# Patient Record
Sex: FEMALE | Race: BLACK OR AFRICAN AMERICAN | NOT HISPANIC OR LATINO | Employment: OTHER | URBAN - METROPOLITAN AREA
[De-identification: names, ages, dates, MRNs, and addresses within clinical notes are randomized per-mention and may not be internally consistent; named-entity substitution may affect disease eponyms.]

---

## 2017-01-27 ENCOUNTER — HISTORICAL (OUTPATIENT)
Dept: RADIOLOGY | Facility: HOSPITAL | Age: 52
End: 2017-01-27

## 2017-02-02 ENCOUNTER — HISTORICAL (OUTPATIENT)
Dept: RADIOLOGY | Facility: HOSPITAL | Age: 52
End: 2017-02-02

## 2017-05-11 ENCOUNTER — HISTORICAL (OUTPATIENT)
Dept: RADIOLOGY | Facility: HOSPITAL | Age: 52
End: 2017-05-11

## 2017-10-04 ENCOUNTER — HISTORICAL (OUTPATIENT)
Dept: LAB | Facility: HOSPITAL | Age: 52
End: 2017-10-04

## 2017-10-04 LAB
ALBUMIN SERPL-MCNC: 4.1 GM/DL (ref 3.4–5)
ALBUMIN/GLOB SERPL: 1.3 RATIO (ref 1.1–2)
ALP SERPL-CCNC: 87 UNIT/L (ref 46–116)
ALT SERPL-CCNC: 31 UNIT/L (ref 12–78)
AST SERPL-CCNC: 18 UNIT/L (ref 15–37)
BILIRUB SERPL-MCNC: 0.5 MG/DL (ref 0.2–1)
BILIRUBIN DIRECT+TOT PNL SERPL-MCNC: 0.13 MG/DL (ref 0–0.2)
BILIRUBIN DIRECT+TOT PNL SERPL-MCNC: 0.37 MG/DL (ref 0–0.8)
BUN SERPL-MCNC: 14.2 MG/DL (ref 7–18)
CALCIUM SERPL-MCNC: 9.6 MG/DL (ref 8.5–10.1)
CHLORIDE SERPL-SCNC: 101 MMOL/L (ref 98–107)
CHOLEST SERPL-MCNC: 235 MG/DL (ref 0–200)
CHOLEST/HDLC SERPL: 3.8 {RATIO} (ref 0–4)
CO2 SERPL-SCNC: 31 MMOL/L (ref 21–32)
CREAT SERPL-MCNC: 0.8 MG/DL (ref 0.6–1.3)
DEPRECATED CALCIDIOL+CALCIFEROL SERPL-MC: 37.32 NG/ML (ref 30–80)
ERYTHROCYTE [DISTWIDTH] IN BLOOD BY AUTOMATED COUNT: 13.9 % (ref 11.5–17)
EST. AVERAGE GLUCOSE BLD GHB EST-MCNC: 160 MG/DL
FERRITIN SERPL-MCNC: 26 NG/ML (ref 8–388)
FOLATE SERPL-MCNC: 14.6 NG/ML (ref 8.6–58.9)
GLOBULIN SER-MCNC: 3.2 GM/DL (ref 2.4–3.5)
GLUCOSE SERPL-MCNC: 179 MG/DL (ref 74–106)
HBA1C MFR BLD: 7.2 % (ref 4.5–6.2)
HCT VFR BLD AUTO: 39.9 % (ref 37–47)
HDLC SERPL-MCNC: 62 MG/DL (ref 40–60)
HGB BLD-MCNC: 13.1 GM/DL (ref 12–16)
IRON SERPL-MCNC: 81 MCG/DL (ref 50–175)
LDLC SERPL CALC-MCNC: 156 MG/DL (ref 0–129)
MCH RBC QN AUTO: 28.1 PG (ref 27–31)
MCHC RBC AUTO-ENTMCNC: 32.9 GM/DL (ref 33–36)
MCV RBC AUTO: 85.5 FL (ref 80–94)
PLATELET # BLD AUTO: 235 X10(3)/MCL (ref 130–400)
PMV BLD AUTO: 7.9 FL (ref 7.4–10.4)
POTASSIUM SERPL-SCNC: 3.4 MMOL/L (ref 3.5–5.1)
PROT SERPL-MCNC: 7.3 GM/DL (ref 6.4–8.2)
RBC # BLD AUTO: 4.67 X10(6)/MCL (ref 4.2–5.4)
SODIUM SERPL-SCNC: 142 MMOL/L (ref 136–145)
T4 FREE SERPL-MCNC: 1.05 NG/DL (ref 0.76–1.46)
TRIGL SERPL-MCNC: 85 MG/DL
TSH SERPL-ACNC: 1.82 MIU/ML (ref 0.36–3.74)
VIT B12 SERPL-MCNC: 342 PG/ML (ref 193–986)
VLDLC SERPL CALC-MCNC: 17 MG/DL
WBC # SPEC AUTO: 7.1 X10(3)/MCL (ref 4.5–11.5)

## 2018-03-15 ENCOUNTER — HISTORICAL (OUTPATIENT)
Dept: LAB | Facility: HOSPITAL | Age: 53
End: 2018-03-15

## 2018-03-15 LAB
ABS NEUT (OLG): 3.3 X10(3)/MCL (ref 2.1–9.2)
ALBUMIN SERPL-MCNC: 3.5 GM/DL (ref 3.4–5)
ALBUMIN/GLOB SERPL: 1 RATIO (ref 1.1–2)
ALP SERPL-CCNC: 83 UNIT/L (ref 46–116)
ALT SERPL-CCNC: 33 UNIT/L (ref 12–78)
AST SERPL-CCNC: 24 UNIT/L (ref 15–37)
BASOPHILS # BLD AUTO: 0.1 X10(3)/MCL
BASOPHILS NFR BLD AUTO: 1 % (ref 0–2)
BILIRUB SERPL-MCNC: 0.4 MG/DL (ref 0.2–1)
BILIRUBIN DIRECT+TOT PNL SERPL-MCNC: 0.13 MG/DL (ref 0–0.2)
BILIRUBIN DIRECT+TOT PNL SERPL-MCNC: 0.26 MG/DL (ref 0–0.8)
BUN SERPL-MCNC: 11.6 MG/DL (ref 7–18)
CALCIUM SERPL-MCNC: 9.4 MG/DL (ref 8.5–10.1)
CHLORIDE SERPL-SCNC: 105 MMOL/L (ref 98–107)
CHOLEST SERPL-MCNC: 134 MG/DL (ref 0–200)
CHOLEST/HDLC SERPL: 1.9 {RATIO} (ref 0–4)
CO2 SERPL-SCNC: 31.5 MMOL/L (ref 21–32)
CREAT SERPL-MCNC: 0.6 MG/DL (ref 0.6–1.3)
DEPRECATED CALCIDIOL+CALCIFEROL SERPL-MC: 22.8 NG/ML (ref 30–80)
EOSINOPHIL # BLD AUTO: 0.1 X10(3)/MCL
EOSINOPHIL NFR BLD AUTO: 1 %
ERYTHROCYTE [DISTWIDTH] IN BLOOD BY AUTOMATED COUNT: 13.9 % (ref 11.5–17)
EST. AVERAGE GLUCOSE BLD GHB EST-MCNC: 154 MG/DL
FOLATE SERPL-MCNC: 5.2 NG/ML (ref 8.6–58.9)
GLOBULIN SER-MCNC: 3.5 GM/DL (ref 2.4–3.5)
GLUCOSE SERPL-MCNC: 124 MG/DL (ref 74–106)
HBA1C MFR BLD: 7 % (ref 4.5–6.2)
HCT VFR BLD AUTO: 38.4 % (ref 37–47)
HDLC SERPL-MCNC: 71 MG/DL (ref 40–60)
HGB BLD-MCNC: 12.2 GM/DL (ref 12–16)
LDLC SERPL CALC-MCNC: 52 MG/DL (ref 0–129)
LYMPHOCYTES # BLD AUTO: 2.4 X10(3)/MCL
LYMPHOCYTES NFR BLD AUTO: 39 % (ref 13–40)
MAGNESIUM SERPL-MCNC: 1.8 MG/DL (ref 1.8–2.4)
MCH RBC QN AUTO: 27.7 PG (ref 27–31)
MCHC RBC AUTO-ENTMCNC: 31.9 GM/DL (ref 33–36)
MCV RBC AUTO: 86.8 FL (ref 80–94)
MONOCYTES # BLD AUTO: 0.3 X10(3)/MCL
MONOCYTES NFR BLD AUTO: 5 % (ref 2–11)
NEUTROPHILS # BLD AUTO: 3.3 X10(3)/MCL (ref 2.1–9.2)
NEUTROPHILS NFR BLD AUTO: 54 % (ref 47–80)
PLATELET # BLD AUTO: 300 X10(3)/MCL (ref 130–400)
PMV BLD AUTO: 8.1 FL (ref 7.4–10.4)
POTASSIUM SERPL-SCNC: 3.8 MMOL/L (ref 3.5–5.1)
PREALB SERPL-MCNC: 24.2 MG/DL (ref 18–35.7)
PROT SERPL-MCNC: 7 GM/DL (ref 6.4–8.2)
RBC # BLD AUTO: 4.43 X10(6)/MCL (ref 4.2–5.4)
SODIUM SERPL-SCNC: 143 MMOL/L (ref 136–145)
TRIGL SERPL-MCNC: 57 MG/DL
TSH SERPL-ACNC: 2.15 MIU/ML (ref 0.36–3.74)
VLDLC SERPL CALC-MCNC: 11 MG/DL
WBC # SPEC AUTO: 6.1 X10(3)/MCL (ref 4.5–11.5)

## 2018-06-18 LAB
BILIRUB SERPL-MCNC: NEGATIVE MG/DL
BLOOD URINE, POC: NEGATIVE
GLUCOSE UR QL STRIP: NEGATIVE
KETONES UR QL STRIP: NEGATIVE
LEUKOCYTE EST, POC UA: NEGATIVE
NITRITE, POC UA: NEGATIVE
PH, POC UA: 5
POC BETA-HCG (QUAL): NEGATIVE
PROTEIN, POC: NEGATIVE
SPECIFIC GRAVITY, POC UA: 1.02
UROBILINOGEN, POC UA: NORMAL

## 2018-06-20 ENCOUNTER — HISTORICAL (OUTPATIENT)
Dept: RADIOLOGY | Facility: HOSPITAL | Age: 53
End: 2018-06-20

## 2018-07-30 ENCOUNTER — HISTORICAL (OUTPATIENT)
Dept: RADIOLOGY | Facility: HOSPITAL | Age: 53
End: 2018-07-30

## 2018-08-13 ENCOUNTER — HISTORICAL (OUTPATIENT)
Dept: RADIOLOGY | Facility: HOSPITAL | Age: 53
End: 2018-08-13

## 2018-08-27 ENCOUNTER — HISTORICAL (OUTPATIENT)
Dept: MEDSURG UNIT | Facility: HOSPITAL | Age: 53
End: 2018-08-27

## 2018-08-27 LAB
ABS NEUT (OLG): 4.6 X10(3)/MCL (ref 1.5–6.9)
ALBUMIN SERPL-MCNC: 3.6 GM/DL (ref 3.4–5)
ALBUMIN/GLOB SERPL: 0.8 RATIO
ALP SERPL-CCNC: 96 UNIT/L (ref 30–113)
ALT SERPL-CCNC: 25 UNIT/L (ref 10–45)
APTT PPP: 24.5 SECOND(S) (ref 25–35)
AST SERPL-CCNC: 22 UNIT/L (ref 15–37)
BASOPHILS # BLD AUTO: 0 X10(3)/MCL (ref 0–0.1)
BASOPHILS NFR BLD AUTO: 0 % (ref 0–1)
BILIRUB SERPL-MCNC: 0.4 MG/DL (ref 0.1–0.9)
BILIRUBIN DIRECT+TOT PNL SERPL-MCNC: 0.1 MG/DL (ref 0–0.3)
BILIRUBIN DIRECT+TOT PNL SERPL-MCNC: 0.3 MG/DL
BUN SERPL-MCNC: 10 MG/DL (ref 10–20)
CALCIUM SERPL-MCNC: 9.4 MG/DL (ref 8–10.5)
CHLORIDE SERPL-SCNC: 101 MMOL/L (ref 100–108)
CO2 SERPL-SCNC: 32 MMOL/L (ref 21–35)
CREAT SERPL-MCNC: 0.66 MG/DL (ref 0.7–1.3)
EOSINOPHIL # BLD AUTO: 0.1 X10(3)/MCL (ref 0–0.6)
EOSINOPHIL NFR BLD AUTO: 1 % (ref 0–5)
ERYTHROCYTE [DISTWIDTH] IN BLOOD BY AUTOMATED COUNT: 12.6 % (ref 11.5–17)
GLOBULIN SER-MCNC: 4.4 GM/DL
GLUCOSE SERPL-MCNC: 139 MG/DL (ref 75–116)
HCT VFR BLD AUTO: 39.2 % (ref 36–48)
HGB BLD-MCNC: 13 GM/DL (ref 12–16)
INR PPP: 1 (ref 0–1.2)
LYMPHOCYTES # BLD AUTO: 2.8 X10(3)/MCL (ref 0.5–4.1)
LYMPHOCYTES NFR BLD AUTO: 34.5 % (ref 15–40)
MCH RBC QN AUTO: 29 PG (ref 27–34)
MCHC RBC AUTO-ENTMCNC: 33 GM/DL (ref 31–36)
MCV RBC AUTO: 86 FL (ref 80–99)
MONOCYTES # BLD AUTO: 0.5 X10(3)/MCL (ref 0–1.1)
MONOCYTES NFR BLD AUTO: 6 % (ref 4–12)
NEUTROPHILS # BLD AUTO: 4.6 X10(3)/MCL (ref 1.5–6.9)
NEUTROPHILS NFR BLD AUTO: 58 % (ref 43–75)
PLATELET # BLD AUTO: 259 X10(3)/MCL (ref 140–400)
PMV BLD AUTO: 9.9 FL (ref 6.8–10)
POTASSIUM SERPL-SCNC: 3.9 MMOL/L (ref 3.6–5.2)
PROT SERPL-MCNC: 8 GM/DL (ref 6.4–8.2)
PROTHROMBIN TIME: 10.3 SECOND(S) (ref 9–12)
RBC # BLD AUTO: 4.53 X10(6)/MCL (ref 4.2–5.4)
SODIUM SERPL-SCNC: 141 MMOL/L (ref 135–145)
WBC # SPEC AUTO: 8 X10(3)/MCL (ref 4.5–11.5)

## 2018-09-02 LAB — FINAL CULTURE: NORMAL

## 2018-10-05 ENCOUNTER — HISTORICAL (OUTPATIENT)
Dept: SURGERY | Facility: HOSPITAL | Age: 53
End: 2018-10-05

## 2018-10-05 LAB
ABS NEUT (OLG): 2.6 X10(3)/MCL (ref 1.5–6.9)
ALBUMIN SERPL-MCNC: 3.4 GM/DL (ref 3.4–5)
ALBUMIN/GLOB SERPL: 1 RATIO
ALP SERPL-CCNC: 86 UNIT/L (ref 30–113)
ALT SERPL-CCNC: 80 UNIT/L (ref 10–45)
APTT PPP: 24.6 SECOND(S) (ref 25–35)
AST SERPL-CCNC: 61 UNIT/L (ref 15–37)
BILIRUB SERPL-MCNC: 0.4 MG/DL (ref 0.1–0.9)
BILIRUBIN DIRECT+TOT PNL SERPL-MCNC: 0.1 MG/DL (ref 0–0.3)
BILIRUBIN DIRECT+TOT PNL SERPL-MCNC: 0.3 MG/DL
BUN SERPL-MCNC: 9 MG/DL (ref 10–20)
CALCIUM SERPL-MCNC: 8.6 MG/DL (ref 8–10.5)
CHLORIDE SERPL-SCNC: 107 MMOL/L (ref 100–108)
CO2 SERPL-SCNC: 32 MMOL/L (ref 21–35)
CREAT SERPL-MCNC: 0.6 MG/DL (ref 0.7–1.3)
ERYTHROCYTE [DISTWIDTH] IN BLOOD BY AUTOMATED COUNT: 12.3 % (ref 11.5–17)
GLOBULIN SER-MCNC: 3.5 GM/DL
GLUCOSE SERPL-MCNC: 98 MG/DL (ref 75–116)
HCT VFR BLD AUTO: 37.3 % (ref 36–48)
HGB BLD-MCNC: 11.5 GM/DL (ref 12–16)
INR PPP: 1 (ref 0–1.2)
MCH RBC QN AUTO: 27 PG (ref 27–34)
MCHC RBC AUTO-ENTMCNC: 31 GM/DL (ref 31–36)
MCV RBC AUTO: 89 FL (ref 80–99)
PLATELET # BLD AUTO: 201 X10(3)/MCL (ref 140–400)
PMV BLD AUTO: 10.2 FL (ref 6.8–10)
POTASSIUM SERPL-SCNC: 3.6 MMOL/L (ref 3.6–5.2)
PROT SERPL-MCNC: 6.9 GM/DL (ref 6.4–8.2)
PROTHROMBIN TIME: 10.1 SECOND(S) (ref 9–12)
RBC # BLD AUTO: 4.19 X10(6)/MCL (ref 4.2–5.4)
SODIUM SERPL-SCNC: 143 MMOL/L (ref 135–145)
WBC # SPEC AUTO: 5.5 X10(3)/MCL (ref 4.5–11.5)

## 2018-10-15 ENCOUNTER — HISTORICAL (OUTPATIENT)
Dept: ANESTHESIOLOGY | Facility: HOSPITAL | Age: 53
End: 2018-10-15

## 2019-03-28 ENCOUNTER — HISTORICAL (OUTPATIENT)
Dept: RADIOLOGY | Facility: HOSPITAL | Age: 54
End: 2019-03-28

## 2019-08-29 LAB
ABS NEUT (OLG): 4.6 X10(3)/MCL (ref 1.5–6.9)
ALBUMIN SERPL-MCNC: 3.9 GM/DL (ref 3.4–5)
ALBUMIN/GLOB SERPL: 0.8 RATIO
ALP SERPL-CCNC: 187 UNIT/L (ref 30–113)
ALT SERPL-CCNC: 69 UNIT/L (ref 10–45)
APTT PPP: 24.7 SECOND(S) (ref 25–35)
AST SERPL-CCNC: 44 UNIT/L (ref 15–37)
BASOPHILS # BLD AUTO: 0 X10(3)/MCL (ref 0–0.1)
BASOPHILS NFR BLD AUTO: 0 % (ref 0–1)
BILIRUB SERPL-MCNC: 0.4 MG/DL (ref 0.1–0.9)
BILIRUBIN DIRECT+TOT PNL SERPL-MCNC: 0.1 MG/DL (ref 0–0.3)
BILIRUBIN DIRECT+TOT PNL SERPL-MCNC: 0.3 MG/DL
BUN SERPL-MCNC: 14 MG/DL (ref 10–20)
CALCIUM SERPL-MCNC: 9.5 MG/DL (ref 8–10.5)
CHLORIDE SERPL-SCNC: 100 MMOL/L (ref 100–108)
CO2 SERPL-SCNC: 33 MMOL/L (ref 21–35)
CREAT SERPL-MCNC: 0.61 MG/DL (ref 0.7–1.3)
EOSINOPHIL # BLD AUTO: 0.1 X10(3)/MCL (ref 0–0.6)
EOSINOPHIL NFR BLD AUTO: 1 % (ref 0–5)
ERYTHROCYTE [DISTWIDTH] IN BLOOD BY AUTOMATED COUNT: 13.4 % (ref 11.5–17)
GLOBULIN SER-MCNC: 4.8 GM/DL
GLUCOSE SERPL-MCNC: 131 MG/DL (ref 75–116)
HCT VFR BLD AUTO: 42.1 % (ref 36–48)
HGB BLD-MCNC: 13.6 GM/DL (ref 12–16)
IMM GRANULOCYTES # BLD AUTO: 0.02 10*3/UL (ref 0–0.02)
IMM GRANULOCYTES NFR BLD AUTO: 0.2 % (ref 0–0.43)
INR PPP: 0.9 (ref 0–1.2)
LYMPHOCYTES # BLD AUTO: 3.1 X10(3)/MCL (ref 0.5–4.1)
LYMPHOCYTES NFR BLD AUTO: 38 % (ref 15–40)
MCH RBC QN AUTO: 28 PG (ref 27–34)
MCHC RBC AUTO-ENTMCNC: 32 GM/DL (ref 31–36)
MCV RBC AUTO: 86 FL (ref 80–99)
MONOCYTES # BLD AUTO: 0.5 X10(3)/MCL (ref 0–1.1)
MONOCYTES NFR BLD AUTO: 6 % (ref 4–12)
NEUTROPHILS # BLD AUTO: 4.6 X10(3)/MCL (ref 1.5–6.9)
NEUTROPHILS NFR BLD AUTO: 55 % (ref 43–75)
PLATELET # BLD AUTO: 226 X10(3)/MCL (ref 140–400)
PMV BLD AUTO: 9.6 FL (ref 6.8–10)
POTASSIUM SERPL-SCNC: 3.6 MMOL/L (ref 3.6–5.2)
PROT SERPL-MCNC: 8.7 GM/DL (ref 6.4–8.2)
PROTHROMBIN TIME: 9.7 SECOND(S) (ref 9–12)
RBC # BLD AUTO: 4.91 X10(6)/MCL (ref 4.2–5.4)
SODIUM SERPL-SCNC: 139 MMOL/L (ref 135–145)
WBC # SPEC AUTO: 8.4 X10(3)/MCL (ref 4.5–11.5)

## 2019-09-04 ENCOUNTER — HISTORICAL (OUTPATIENT)
Dept: RADIOLOGY | Facility: HOSPITAL | Age: 54
End: 2019-09-04

## 2019-09-05 ENCOUNTER — HISTORICAL (OUTPATIENT)
Dept: ANESTHESIOLOGY | Facility: HOSPITAL | Age: 54
End: 2019-09-05

## 2019-09-24 ENCOUNTER — HISTORICAL (OUTPATIENT)
Dept: RADIOLOGY | Facility: HOSPITAL | Age: 54
End: 2019-09-24

## 2019-10-04 LAB — PAP RECOMMENDATION EXT: NORMAL

## 2019-10-16 ENCOUNTER — HISTORICAL (OUTPATIENT)
Dept: FAMILY MEDICINE | Facility: CLINIC | Age: 54
End: 2019-10-16

## 2019-10-16 LAB
ABS NEUT (OLG): 3.84 X10(3)/MCL (ref 2.1–9.2)
ALBUMIN SERPL-MCNC: 3.9 GM/DL (ref 3.4–5)
ALBUMIN/GLOB SERPL: 1 RATIO (ref 1.1–2)
ALP SERPL-CCNC: 152 UNIT/L (ref 45–117)
ALT SERPL-CCNC: 43 UNIT/L (ref 12–78)
APPEARANCE, UA: CLEAR
AST SERPL-CCNC: 35 UNIT/L (ref 15–37)
BACTERIA #/AREA URNS AUTO: ABNORMAL /[HPF]
BASOPHILS # BLD AUTO: 0 X10(3)/MCL (ref 0–0.2)
BASOPHILS NFR BLD AUTO: 1 %
BILIRUB SERPL-MCNC: 0.7 MG/DL (ref 0.2–1)
BILIRUB UR QL STRIP: NEGATIVE
BILIRUBIN DIRECT+TOT PNL SERPL-MCNC: 0.2 MG/DL (ref 0–0.2)
BILIRUBIN DIRECT+TOT PNL SERPL-MCNC: 0.5 MG/DL
BUN SERPL-MCNC: 10 MG/DL (ref 7–18)
CALCIUM SERPL-MCNC: 9 MG/DL (ref 8.5–10.1)
CHLORIDE SERPL-SCNC: 104 MMOL/L (ref 98–107)
CHOLEST SERPL-MCNC: 141 MG/DL
CHOLEST/HDLC SERPL: 1.5 {RATIO} (ref 0–4.4)
CO2 SERPL-SCNC: 31 MMOL/L (ref 21–32)
COLOR UR: ABNORMAL
CREAT SERPL-MCNC: 0.6 MG/DL (ref 0.6–1.3)
CREAT UR-MCNC: 99 MG/DL
DEPRECATED CALCIDIOL+CALCIFEROL SERPL-MC: 27.71 NG/ML (ref 30–80)
EOSINOPHIL # BLD AUTO: 0.1 X10(3)/MCL (ref 0–0.9)
EOSINOPHIL NFR BLD AUTO: 1 %
ERYTHROCYTE [DISTWIDTH] IN BLOOD BY AUTOMATED COUNT: 13.9 % (ref 11.5–14.5)
EST. AVERAGE GLUCOSE BLD GHB EST-MCNC: 174 MG/DL
GLOBULIN SER-MCNC: 4 GM/ML (ref 2.3–3.5)
GLUCOSE (UA): >1000 MG/DL
GLUCOSE SERPL-MCNC: 108 MG/DL (ref 74–106)
HBA1C MFR BLD: 7.7 % (ref 4.2–6.3)
HCT VFR BLD AUTO: 41.4 % (ref 35–46)
HDLC SERPL-MCNC: 94 MG/DL (ref 40–59)
HGB BLD-MCNC: 13 GM/DL (ref 12–16)
HGB UR QL STRIP: NEGATIVE
HYALINE CASTS #/AREA URNS LPF: ABNORMAL /[LPF]
IMM GRANULOCYTES # BLD AUTO: 0.01 10*3/UL
IMM GRANULOCYTES NFR BLD AUTO: 0 %
KETONES UR QL STRIP: NEGATIVE
LDLC SERPL CALC-MCNC: 33 MG/DL
LEUKOCYTE ESTERASE UR QL STRIP: 25 LEU/UL
LYMPHOCYTES # BLD AUTO: 1.5 X10(3)/MCL (ref 0.6–4.6)
LYMPHOCYTES NFR BLD AUTO: 25 %
MCH RBC QN AUTO: 27.5 PG (ref 26–34)
MCHC RBC AUTO-ENTMCNC: 31.4 GM/DL (ref 31–37)
MCV RBC AUTO: 87.7 FL (ref 80–100)
MICROALBUMIN UR-MCNC: 8.4 MG/L (ref 0–19)
MICROALBUMIN/CREAT RATIO PNL UR: 8.5 MCG/MG CR (ref 0–29)
MONOCYTES # BLD AUTO: 0.4 X10(3)/MCL (ref 0.1–1.3)
MONOCYTES NFR BLD AUTO: 6 %
NEUTROPHILS # BLD AUTO: 3.84 X10(3)/MCL (ref 2.1–9.2)
NEUTROPHILS NFR BLD AUTO: 66 %
NITRITE UR QL STRIP: NEGATIVE
PH UR STRIP: 7.5 [PH] (ref 4.5–8)
PLATELET # BLD AUTO: 223 X10(3)/MCL (ref 130–400)
PMV BLD AUTO: 9.8 FL (ref 7.4–10.4)
POTASSIUM SERPL-SCNC: 3.8 MMOL/L (ref 3.5–5.1)
PROT SERPL-MCNC: 7.9 GM/DL (ref 6.4–8.2)
PROT UR QL STRIP: NEGATIVE
RBC # BLD AUTO: 4.72 X10(6)/MCL (ref 4–5.2)
RBC #/AREA URNS AUTO: ABNORMAL /[HPF]
SODIUM SERPL-SCNC: 139 MMOL/L (ref 136–145)
SP GR UR STRIP: 1.03 (ref 1–1.03)
SQUAMOUS #/AREA URNS LPF: ABNORMAL /[LPF]
T3FREE SERPL-MCNC: 2.32 PG/ML (ref 2.18–3.98)
T4 FREE SERPL-MCNC: 0.92 NG/DL (ref 0.76–1.46)
TRIGL SERPL-MCNC: 71 MG/DL
TSH SERPL-ACNC: 1.87 MIU/L (ref 0.36–3.74)
UROBILINOGEN UR STRIP-ACNC: NORMAL
VLDLC SERPL CALC-MCNC: 14 MG/DL
WBC # SPEC AUTO: 5.8 X10(3)/MCL (ref 4.5–11)
WBC #/AREA URNS AUTO: ABNORMAL /HPF

## 2019-10-18 ENCOUNTER — HISTORICAL (OUTPATIENT)
Dept: RADIOLOGY | Facility: HOSPITAL | Age: 54
End: 2019-10-18

## 2019-12-20 ENCOUNTER — HISTORICAL (OUTPATIENT)
Dept: ADMINISTRATIVE | Facility: HOSPITAL | Age: 54
End: 2019-12-20

## 2020-01-07 ENCOUNTER — HISTORICAL (OUTPATIENT)
Dept: RADIOLOGY | Facility: HOSPITAL | Age: 55
End: 2020-01-07

## 2020-01-07 LAB
CK SERPL-CCNC: 154 UNIT/L (ref 26–192)
EST. AVERAGE GLUCOSE BLD GHB EST-MCNC: 171 MG/DL
HBA1C MFR BLD: 7.6 % (ref 4.2–6.3)

## 2020-01-08 ENCOUNTER — HISTORICAL (OUTPATIENT)
Dept: ADMINISTRATIVE | Facility: HOSPITAL | Age: 55
End: 2020-01-08

## 2020-01-30 ENCOUNTER — HISTORICAL (OUTPATIENT)
Dept: LAB | Facility: HOSPITAL | Age: 55
End: 2020-01-30

## 2020-01-30 LAB
ABS NEUT (OLG): 3.26 X10(3)/MCL (ref 2.1–9.2)
ALBUMIN SERPL-MCNC: 3.8 GM/DL (ref 3.4–5)
ALBUMIN/GLOB SERPL: 1.1 RATIO (ref 1.1–2)
ALP SERPL-CCNC: 94 UNIT/L (ref 46–116)
ALT SERPL-CCNC: 33 UNIT/L (ref 12–78)
AST SERPL-CCNC: 23 UNIT/L (ref 15–37)
BASOPHILS # BLD AUTO: 0 X10(3)/MCL (ref 0–0.2)
BASOPHILS NFR BLD AUTO: 0 %
BILIRUB SERPL-MCNC: 0.5 MG/DL (ref 0.2–1)
BILIRUBIN DIRECT+TOT PNL SERPL-MCNC: 0.18 MG/DL (ref 0–0.2)
BILIRUBIN DIRECT+TOT PNL SERPL-MCNC: 0.32 MG/DL (ref 0–0.8)
BUN SERPL-MCNC: 17.8 MG/DL (ref 7–18)
CALCIUM SERPL-MCNC: 9.5 MG/DL (ref 8.5–10.1)
CHLORIDE SERPL-SCNC: 103 MMOL/L (ref 98–107)
CHOLEST SERPL-MCNC: 115 MG/DL (ref 0–200)
CHOLEST/HDLC SERPL: 1.5 {RATIO} (ref 0–4)
CO2 SERPL-SCNC: 30.9 MMOL/L (ref 21–32)
CREAT SERPL-MCNC: 0.58 MG/DL (ref 0.6–1.3)
DEPRECATED CALCIDIOL+CALCIFEROL SERPL-MC: 43.62 NG/ML (ref 30–80)
EOSINOPHIL # BLD AUTO: 0.1 X10(3)/MCL (ref 0–0.9)
EOSINOPHIL NFR BLD AUTO: 2 %
ERYTHROCYTE [DISTWIDTH] IN BLOOD BY AUTOMATED COUNT: 13.1 % (ref 11.5–17)
GLOBULIN SER-MCNC: 3.6 GM/DL (ref 2.4–3.5)
GLUCOSE SERPL-MCNC: 100 MG/DL (ref 74–106)
HCT VFR BLD AUTO: 38.8 % (ref 37–47)
HDLC SERPL-MCNC: 78 MG/DL (ref 40–60)
HGB BLD-MCNC: 12.1 GM/DL (ref 12–16)
IMM GRANULOCYTES # BLD AUTO: 0.01 % (ref 0–0.02)
IMM GRANULOCYTES NFR BLD AUTO: 0.2 % (ref 0–0.43)
LDLC SERPL CALC-MCNC: 29 MG/DL (ref 0–129)
LYMPHOCYTES # BLD AUTO: 2.5 X10(3)/MCL (ref 0.6–4.6)
LYMPHOCYTES NFR BLD AUTO: 40 %
MCH RBC QN AUTO: 27.9 PG (ref 27–31)
MCHC RBC AUTO-ENTMCNC: 31.2 GM/DL (ref 33–36)
MCV RBC AUTO: 89.6 FL (ref 80–94)
MONOCYTES # BLD AUTO: 0.3 X10(3)/MCL (ref 0.1–1.3)
MONOCYTES NFR BLD AUTO: 5 %
NEUTROPHILS # BLD AUTO: 3.26 X10(3)/MCL (ref 1.4–7.9)
NEUTROPHILS NFR BLD AUTO: 53 %
PLATELET # BLD AUTO: 227 X10(3)/MCL (ref 130–400)
PMV BLD AUTO: 10 FL (ref 9.4–12.4)
POTASSIUM SERPL-SCNC: 3.6 MMOL/L (ref 3.5–5.1)
PROT SERPL-MCNC: 7.4 GM/DL (ref 6.4–8.2)
RBC # BLD AUTO: 4.33 X10(6)/MCL (ref 4.2–5.4)
SODIUM SERPL-SCNC: 141 MMOL/L (ref 136–145)
T3FREE SERPL-MCNC: 2.4 PG/ML (ref 2.2–4)
T4 FREE SERPL-MCNC: 1.1 NG/DL (ref 0.76–1.46)
TRIGL SERPL-MCNC: 40 MG/DL
TSH SERPL-ACNC: 1.68 MIU/ML (ref 0.36–3.74)
VLDLC SERPL CALC-MCNC: 8 MG/DL
WBC # SPEC AUTO: 6.2 X10(3)/MCL (ref 4.5–11.5)

## 2020-02-04 ENCOUNTER — HISTORICAL (OUTPATIENT)
Dept: ADMINISTRATIVE | Facility: HOSPITAL | Age: 55
End: 2020-02-04

## 2020-02-04 LAB
APPEARANCE, UA: CLEAR
BACTERIA #/AREA URNS AUTO: ABNORMAL /HPF
BILIRUB UR QL STRIP: NEGATIVE
COLOR UR: ABNORMAL
GLUCOSE (UA): >1000 MG/DL
HGB UR QL STRIP: NEGATIVE
HYALINE CASTS #/AREA URNS LPF: ABNORMAL /LPF
KETONES UR QL STRIP: NEGATIVE
LEUKOCYTE ESTERASE UR QL STRIP: 250 LEU/UL
NITRITE UR QL STRIP: NEGATIVE
PH UR STRIP: 7.5 [PH] (ref 4.5–8)
PROT UR QL STRIP: NEGATIVE
RBC #/AREA URNS AUTO: ABNORMAL /HPF
SP GR UR STRIP: 1.02 (ref 1–1.03)
SQUAMOUS #/AREA URNS LPF: ABNORMAL /LPF
UROBILINOGEN UR STRIP-ACNC: NORMAL
WBC #/AREA URNS AUTO: >=100 /HPF

## 2020-03-09 ENCOUNTER — HISTORICAL (OUTPATIENT)
Dept: LAB | Facility: HOSPITAL | Age: 55
End: 2020-03-09

## 2020-03-09 LAB
ALBUMIN SERPL-MCNC: 3.7 GM/DL (ref 3.4–5)
ALP SERPL-CCNC: 93 UNIT/L (ref 46–116)
ALT SERPL-CCNC: 33 UNIT/L (ref 12–78)
AST SERPL-CCNC: 20 UNIT/L (ref 15–37)
BILIRUB SERPL-MCNC: 0.4 MG/DL (ref 0.2–1)
BILIRUBIN DIRECT+TOT PNL SERPL-MCNC: 0.11 MG/DL (ref 0–0.2)
BILIRUBIN DIRECT+TOT PNL SERPL-MCNC: 0.29 MG/DL (ref 0–0.8)
CHOLEST SERPL-MCNC: 181 MG/DL (ref 0–200)
CHOLEST/HDLC SERPL: 2.3 {RATIO} (ref 0–4)
HDLC SERPL-MCNC: 78 MG/DL (ref 40–60)
LDLC SERPL CALC-MCNC: 92 MG/DL (ref 0–129)
PROT SERPL-MCNC: 7 GM/DL (ref 6.4–8.2)
TRIGL SERPL-MCNC: 53 MG/DL
VLDLC SERPL CALC-MCNC: 11 MG/DL

## 2020-05-21 ENCOUNTER — HISTORICAL (OUTPATIENT)
Dept: ADMINISTRATIVE | Facility: HOSPITAL | Age: 55
End: 2020-05-21

## 2020-05-21 LAB
ABS NEUT (OLG): 4 X10(3)/MCL (ref 2.1–9.2)
ALBUMIN SERPL-MCNC: 3.8 GM/DL (ref 3.4–5)
ALBUMIN/GLOB SERPL: 0.9 RATIO (ref 1.1–2)
ALP SERPL-CCNC: 105 UNIT/L (ref 45–117)
ALT SERPL-CCNC: 41 UNIT/L (ref 12–78)
AST SERPL-CCNC: 26 UNIT/L (ref 15–37)
BASOPHILS # BLD AUTO: 0 X10(3)/MCL (ref 0–0.2)
BASOPHILS NFR BLD AUTO: 1 %
BILIRUB SERPL-MCNC: 0.3 MG/DL (ref 0.2–1)
BILIRUBIN DIRECT+TOT PNL SERPL-MCNC: <0.1 MG/DL (ref 0–0.2)
BILIRUBIN DIRECT+TOT PNL SERPL-MCNC: ABNORMAL MG/DL
BUN SERPL-MCNC: 11 MG/DL (ref 7–18)
CALCIUM SERPL-MCNC: 9.4 MG/DL (ref 8.5–10.1)
CHLORIDE SERPL-SCNC: 105 MMOL/L (ref 98–107)
CHOLEST SERPL-MCNC: 190 MG/DL
CHOLEST/HDLC SERPL: 2.2 {RATIO} (ref 0–4.4)
CO2 SERPL-SCNC: 31 MMOL/L (ref 21–32)
CREAT SERPL-MCNC: 0.6 MG/DL (ref 0.6–1.3)
DEPRECATED CALCIDIOL+CALCIFEROL SERPL-MC: 24.1 NG/ML (ref 30–80)
EOSINOPHIL # BLD AUTO: 0.1 X10(3)/MCL (ref 0–0.9)
EOSINOPHIL NFR BLD AUTO: 1 %
ERYTHROCYTE [DISTWIDTH] IN BLOOD BY AUTOMATED COUNT: 12.6 % (ref 11.5–14.5)
EST. AVERAGE GLUCOSE BLD GHB EST-MCNC: 189 MG/DL
GLOBULIN SER-MCNC: 4.3 GM/ML (ref 2.3–3.5)
GLUCOSE SERPL-MCNC: 124 MG/DL (ref 74–106)
HBA1C MFR BLD: 8.2 % (ref 4.2–6.3)
HCT VFR BLD AUTO: 43.9 % (ref 35–46)
HDLC SERPL-MCNC: 85 MG/DL (ref 40–59)
HGB BLD-MCNC: 13.6 GM/DL (ref 12–16)
IMM GRANULOCYTES # BLD AUTO: 0.02 10*3/UL
IMM GRANULOCYTES NFR BLD AUTO: 0 %
LDLC SERPL CALC-MCNC: 92 MG/DL
LYMPHOCYTES # BLD AUTO: 2.9 X10(3)/MCL (ref 0.6–4.6)
LYMPHOCYTES NFR BLD AUTO: 40 %
MCH RBC QN AUTO: 27.5 PG (ref 26–34)
MCHC RBC AUTO-ENTMCNC: 31 GM/DL (ref 31–37)
MCV RBC AUTO: 88.7 FL (ref 80–100)
MONOCYTES # BLD AUTO: 0.4 X10(3)/MCL (ref 0.1–1.3)
MONOCYTES NFR BLD AUTO: 5 %
NEUTROPHILS # BLD AUTO: 4 X10(3)/MCL (ref 2.1–9.2)
NEUTROPHILS NFR BLD AUTO: 54 %
PLATELET # BLD AUTO: 257 X10(3)/MCL (ref 130–400)
PMV BLD AUTO: 10.2 FL (ref 7.4–10.4)
POTASSIUM SERPL-SCNC: 3.7 MMOL/L (ref 3.5–5.1)
PROT SERPL-MCNC: 8.1 GM/DL (ref 6.4–8.2)
RBC # BLD AUTO: 4.95 X10(6)/MCL (ref 4–5.2)
SODIUM SERPL-SCNC: 142 MMOL/L (ref 136–145)
T3FREE SERPL-MCNC: 2.51 PG/ML (ref 2.18–3.98)
T4 FREE SERPL-MCNC: 1.04 NG/DL (ref 0.76–1.46)
TRIGL SERPL-MCNC: 63 MG/DL
TSH SERPL-ACNC: 1.81 MIU/L (ref 0.36–3.74)
VLDLC SERPL CALC-MCNC: 13 MG/DL
WBC # SPEC AUTO: 7.4 X10(3)/MCL (ref 4.5–11)

## 2020-07-21 LAB
BILIRUB SERPL-MCNC: NEGATIVE MG/DL
BLOOD URINE, POC: NEGATIVE
CLARITY, POC UA: NORMAL
COLOR, POC UA: YELLOW
GLUCOSE UR QL STRIP: NORMAL
KETONES UR QL STRIP: NORMAL
LEUKOCYTE EST, POC UA: NORMAL
NITRITE, POC UA: POSITIVE
PH, POC UA: 5
PROTEIN, POC: NEGATIVE
SPECIFIC GRAVITY, POC UA: 1.01
UROBILINOGEN, POC UA: NORMAL

## 2020-08-20 ENCOUNTER — HISTORICAL (OUTPATIENT)
Dept: ADMINISTRATIVE | Facility: HOSPITAL | Age: 55
End: 2020-08-20

## 2020-10-07 LAB
BILIRUB SERPL-MCNC: NEGATIVE MG/DL
BLOOD URINE, POC: NORMAL
CLARITY, POC UA: NORMAL
COLOR, POC UA: YELLOW
GLUCOSE UR QL STRIP: NORMAL
KETONES UR QL STRIP: NEGATIVE
LEUKOCYTE EST, POC UA: NORMAL
NITRITE, POC UA: NEGATIVE
PH, POC UA: 6
PROTEIN, POC: NEGATIVE
SPECIFIC GRAVITY, POC UA: 1.02
UROBILINOGEN, POC UA: NORMAL

## 2020-10-16 ENCOUNTER — HISTORICAL (OUTPATIENT)
Dept: LAB | Facility: HOSPITAL | Age: 55
End: 2020-10-16

## 2020-10-16 LAB
CREAT UR-MCNC: 59.9 MG/DL (ref 30–125)
DEPRECATED CALCIDIOL+CALCIFEROL SERPL-MC: 24.2 NG/ML (ref 6.6–49.9)
EST. AVERAGE GLUCOSE BLD GHB EST-MCNC: 217 MG/DL
HBA1C MFR BLD: 9.2 % (ref 4.5–6.2)
MICROALBUMIN UR-MCNC: >10 MG/DL (ref 0–3)
MICROALBUMIN/CREAT RATIO PNL UR: >166.9 MG/GM CR (ref 0–30)

## 2020-10-20 ENCOUNTER — HISTORICAL (OUTPATIENT)
Dept: ADMINISTRATIVE | Facility: HOSPITAL | Age: 55
End: 2020-10-20

## 2020-10-20 LAB
ABS NEUT (OLG): 3.69 X10(3)/MCL (ref 2.1–9.2)
ALBUMIN SERPL-MCNC: 3.9 GM/DL (ref 3.4–5)
ALBUMIN/GLOB SERPL: 1.1 RATIO (ref 1.1–2)
ALP SERPL-CCNC: 95 UNIT/L (ref 45–117)
ALT SERPL-CCNC: 28 UNIT/L (ref 12–78)
AST SERPL-CCNC: 20 UNIT/L (ref 15–37)
BASOPHILS # BLD AUTO: 0.1 X10(3)/MCL (ref 0–0.2)
BASOPHILS NFR BLD AUTO: 1 %
BILIRUB SERPL-MCNC: 0.4 MG/DL (ref 0.2–1)
BILIRUBIN DIRECT+TOT PNL SERPL-MCNC: 0.1 MG/DL (ref 0–0.2)
BILIRUBIN DIRECT+TOT PNL SERPL-MCNC: 0.3 MG/DL
BUN SERPL-MCNC: 11 MG/DL (ref 7–18)
CALCIUM SERPL-MCNC: 9.3 MG/DL (ref 8.5–10.1)
CHLORIDE SERPL-SCNC: 107 MMOL/L (ref 98–107)
CHOLEST SERPL-MCNC: 179 MG/DL
CHOLEST/HDLC SERPL: 2.5 {RATIO} (ref 0–4.4)
CO2 SERPL-SCNC: 29 MMOL/L (ref 21–32)
CREAT SERPL-MCNC: 0.6 MG/DL (ref 0.6–1.3)
DEPRECATED CALCIDIOL+CALCIFEROL SERPL-MC: 22.9 NG/ML (ref 30–80)
EOSINOPHIL # BLD AUTO: 0.1 X10(3)/MCL (ref 0–0.9)
EOSINOPHIL NFR BLD AUTO: 1 %
ERYTHROCYTE [DISTWIDTH] IN BLOOD BY AUTOMATED COUNT: 12.8 % (ref 11.5–14.5)
GLOBULIN SER-MCNC: 3.4 GM/ML (ref 2.3–3.5)
GLUCOSE SERPL-MCNC: 168 MG/DL (ref 74–106)
HCT VFR BLD AUTO: 40.1 % (ref 35–46)
HDLC SERPL-MCNC: 71 MG/DL (ref 40–59)
HGB BLD-MCNC: 12.5 GM/DL (ref 12–16)
IMM GRANULOCYTES # BLD AUTO: 0.01 10*3/UL
IMM GRANULOCYTES NFR BLD AUTO: 0 %
LDLC SERPL CALC-MCNC: 95 MG/DL
LYMPHOCYTES # BLD AUTO: 2.9 X10(3)/MCL (ref 0.6–4.6)
LYMPHOCYTES NFR BLD AUTO: 40 %
MCH RBC QN AUTO: 27.7 PG (ref 26–34)
MCHC RBC AUTO-ENTMCNC: 31.2 GM/DL (ref 31–37)
MCV RBC AUTO: 88.7 FL (ref 80–100)
MONOCYTES # BLD AUTO: 0.4 X10(3)/MCL (ref 0.1–1.3)
MONOCYTES NFR BLD AUTO: 6 %
NEUTROPHILS # BLD AUTO: 3.69 X10(3)/MCL (ref 2.1–9.2)
NEUTROPHILS NFR BLD AUTO: 52 %
PLATELET # BLD AUTO: 229 X10(3)/MCL (ref 130–400)
PMV BLD AUTO: 10.8 FL (ref 7.4–10.4)
POTASSIUM SERPL-SCNC: 3.6 MMOL/L (ref 3.5–5.1)
PROT SERPL-MCNC: 7.3 GM/DL (ref 6.4–8.2)
RBC # BLD AUTO: 4.52 X10(6)/MCL (ref 4–5.2)
SODIUM SERPL-SCNC: 143 MMOL/L (ref 136–145)
T3FREE SERPL-MCNC: 2.07 PG/ML (ref 2.18–3.98)
T4 FREE SERPL-MCNC: 1.04 NG/DL (ref 0.76–1.46)
TRIGL SERPL-MCNC: 66 MG/DL
TSH SERPL-ACNC: 2.07 MIU/L (ref 0.36–3.74)
VLDLC SERPL CALC-MCNC: 13 MG/DL
WBC # SPEC AUTO: 7.2 X10(3)/MCL (ref 4.5–11)

## 2020-10-28 ENCOUNTER — HISTORICAL (OUTPATIENT)
Dept: RADIOLOGY | Facility: HOSPITAL | Age: 55
End: 2020-10-28

## 2020-11-19 ENCOUNTER — HISTORICAL (OUTPATIENT)
Dept: ADMINISTRATIVE | Facility: HOSPITAL | Age: 55
End: 2020-11-19

## 2020-12-09 ENCOUNTER — HISTORICAL (OUTPATIENT)
Dept: LAB | Facility: HOSPITAL | Age: 55
End: 2020-12-09

## 2020-12-09 LAB
ALBUMIN SERPL-MCNC: 4.2 GM/DL (ref 3.5–5)
ALP SERPL-CCNC: 94 UNIT/L (ref 40–150)
ALT SERPL-CCNC: 23 UNIT/L (ref 0–55)
AST SERPL-CCNC: 27 UNIT/L (ref 5–34)
BILIRUB SERPL-MCNC: 0.5 MG/DL
BILIRUBIN DIRECT+TOT PNL SERPL-MCNC: 0.2 MG/DL (ref 0–0.5)
BILIRUBIN DIRECT+TOT PNL SERPL-MCNC: 0.3 MG/DL (ref 0–0.8)
CHOLEST SERPL-MCNC: 140 MG/DL
CHOLEST/HDLC SERPL: 2 {RATIO} (ref 0–5)
HDLC SERPL-MCNC: 65 MG/DL (ref 35–60)
LDLC SERPL CALC-MCNC: 58 MG/DL (ref 50–140)
PROT SERPL-MCNC: 8.2 GM/DL (ref 6.4–8.3)
TRIGL SERPL-MCNC: 84 MG/DL (ref 37–140)
VLDLC SERPL CALC-MCNC: 17 MG/DL

## 2021-01-21 ENCOUNTER — HISTORICAL (OUTPATIENT)
Dept: ADMINISTRATIVE | Facility: HOSPITAL | Age: 56
End: 2021-01-21

## 2021-01-21 LAB — SARS-COV-2 RNA RESP QL NAA+PROBE: NEGATIVE

## 2021-01-24 LAB — FINAL CULTURE: NORMAL

## 2021-01-25 ENCOUNTER — HISTORICAL (OUTPATIENT)
Dept: ADMINISTRATIVE | Facility: HOSPITAL | Age: 56
End: 2021-01-25

## 2021-01-25 LAB — SARS-COV-2 RNA RESP QL NAA+PROBE: NEGATIVE

## 2021-01-28 LAB — FINAL CULTURE: NORMAL

## 2021-02-02 ENCOUNTER — HISTORICAL (OUTPATIENT)
Dept: LAB | Facility: HOSPITAL | Age: 56
End: 2021-02-02

## 2021-02-02 LAB
ABS NEUT (OLG): 3.91 X10(3)/MCL (ref 2.1–9.2)
ALBUMIN SERPL-MCNC: 3.9 GM/DL (ref 3.5–5)
ALBUMIN/GLOB SERPL: 1.3 RATIO (ref 1.1–2)
ALP SERPL-CCNC: 85 UNIT/L (ref 40–150)
ALT SERPL-CCNC: 23 UNIT/L (ref 0–55)
APPEARANCE, UA: ABNORMAL
AST SERPL-CCNC: 21 UNIT/L (ref 5–34)
BACTERIA SPEC CULT: ABNORMAL
BASOPHILS # BLD AUTO: 0 X10(3)/MCL (ref 0–0.2)
BASOPHILS NFR BLD AUTO: 0 %
BILIRUB SERPL-MCNC: 0.4 MG/DL
BILIRUB UR QL STRIP: NEGATIVE
BILIRUBIN DIRECT+TOT PNL SERPL-MCNC: 0.1 MG/DL (ref 0–0.5)
BILIRUBIN DIRECT+TOT PNL SERPL-MCNC: 0.3 MG/DL (ref 0–0.8)
BUN SERPL-MCNC: 17.3 MG/DL (ref 9.8–20.1)
CALCIUM SERPL-MCNC: 9.3 MG/DL (ref 8.4–10.2)
CHLORIDE SERPL-SCNC: 105 MMOL/L (ref 98–107)
CHOLEST SERPL-MCNC: 197 MG/DL
CHOLEST/HDLC SERPL: 3 {RATIO} (ref 0–5)
CO2 SERPL-SCNC: 29 MMOL/L (ref 22–29)
COLOR UR: YELLOW
CREAT SERPL-MCNC: 0.63 MG/DL (ref 0.55–1.02)
EOSINOPHIL # BLD AUTO: 0.1 X10(3)/MCL (ref 0–0.9)
EOSINOPHIL NFR BLD AUTO: 1 %
ERYTHROCYTE [DISTWIDTH] IN BLOOD BY AUTOMATED COUNT: 12.4 % (ref 11.5–17)
EST. AVERAGE GLUCOSE BLD GHB EST-MCNC: 214.5 MG/DL
GLOBULIN SER-MCNC: 3 GM/DL (ref 2.4–3.5)
GLUCOSE (UA): ABNORMAL
GLUCOSE SERPL-MCNC: 185 MG/DL (ref 74–100)
HBA1C MFR BLD: 9.1 %
HCT VFR BLD AUTO: 42.1 % (ref 37–47)
HDLC SERPL-MCNC: 70 MG/DL (ref 35–60)
HGB BLD-MCNC: 13.1 GM/DL (ref 12–16)
HGB UR QL STRIP: ABNORMAL
IMM GRANULOCYTES # BLD AUTO: 0.01 % (ref 0–0.02)
IMM GRANULOCYTES NFR BLD AUTO: 0.1 % (ref 0–0.43)
KETONES UR QL STRIP: NEGATIVE
LDLC SERPL CALC-MCNC: 114 MG/DL (ref 50–140)
LEUKOCYTE ESTERASE UR QL STRIP: ABNORMAL
LYMPHOCYTES # BLD AUTO: 2.8 X10(3)/MCL (ref 0.6–4.6)
LYMPHOCYTES NFR BLD AUTO: 39 %
MCH RBC QN AUTO: 27.9 PG (ref 27–31)
MCHC RBC AUTO-ENTMCNC: 31.1 GM/DL (ref 33–36)
MCV RBC AUTO: 89.8 FL (ref 80–94)
MONOCYTES # BLD AUTO: 0.4 X10(3)/MCL (ref 0.1–1.3)
MONOCYTES NFR BLD AUTO: 5 %
NEUTROPHILS # BLD AUTO: 3.91 X10(3)/MCL (ref 1.4–7.9)
NEUTROPHILS NFR BLD AUTO: 54 %
NITRITE UR QL STRIP: NEGATIVE
PH UR STRIP: 5.5 [PH] (ref 5–9)
PLATELET # BLD AUTO: 242 X10(3)/MCL (ref 130–400)
PMV BLD AUTO: 9.7 FL (ref 9.4–12.4)
POTASSIUM SERPL-SCNC: 4.1 MMOL/L (ref 3.5–5.1)
PROT SERPL-MCNC: 6.9 GM/DL (ref 6.4–8.3)
PROT UR QL STRIP: NEGATIVE
RBC # BLD AUTO: 4.69 X10(6)/MCL (ref 4.2–5.4)
RBC #/AREA URNS HPF: ABNORMAL /[HPF]
SODIUM SERPL-SCNC: 143 MMOL/L (ref 136–145)
SP GR UR STRIP: 1.02 (ref 1–1.03)
SQUAMOUS EPITHELIAL, UA: ABNORMAL
T3FREE SERPL-MCNC: 3.05 PG/ML (ref 1.71–3.71)
T4 FREE SERPL-MCNC: 0.98 NG/DL (ref 0.7–1.48)
TRIGL SERPL-MCNC: 67 MG/DL (ref 37–140)
TSH SERPL-ACNC: 3.43 UIU/ML (ref 0.35–4.94)
UROBILINOGEN UR STRIP-ACNC: 0.2
VLDLC SERPL CALC-MCNC: 13 MG/DL
WBC # SPEC AUTO: 7.2 X10(3)/MCL (ref 4.5–11.5)
WBC #/AREA URNS HPF: ABNORMAL /HPF

## 2021-05-28 ENCOUNTER — HISTORICAL (OUTPATIENT)
Dept: ADMINISTRATIVE | Facility: HOSPITAL | Age: 56
End: 2021-05-28

## 2021-05-28 LAB — POC CREATININE: 0.6 MG/DL (ref 0.6–1.3)

## 2021-06-08 ENCOUNTER — HISTORICAL (OUTPATIENT)
Dept: ADMINISTRATIVE | Facility: HOSPITAL | Age: 56
End: 2021-06-08

## 2021-06-25 ENCOUNTER — HISTORICAL (OUTPATIENT)
Dept: LAB | Facility: HOSPITAL | Age: 56
End: 2021-06-25

## 2021-06-25 LAB
ABS NEUT (OLG): 4.67 X10(3)/MCL (ref 2.1–9.2)
ALBUMIN SERPL-MCNC: 4 GM/DL (ref 3.5–5)
ALBUMIN/GLOB SERPL: 1.2 RATIO (ref 1.1–2)
ALP SERPL-CCNC: 101 UNIT/L (ref 40–150)
ALT SERPL-CCNC: 24 UNIT/L (ref 0–55)
AST SERPL-CCNC: 18 UNIT/L (ref 5–34)
BASOPHILS # BLD AUTO: 0 X10(3)/MCL (ref 0–0.2)
BASOPHILS NFR BLD AUTO: 0 %
BILIRUB SERPL-MCNC: 0.5 MG/DL
BILIRUBIN DIRECT+TOT PNL SERPL-MCNC: 0.2 MG/DL (ref 0–0.8)
BILIRUBIN DIRECT+TOT PNL SERPL-MCNC: 0.3 MG/DL (ref 0–0.5)
BUN SERPL-MCNC: 10.9 MG/DL (ref 9.8–20.1)
CALCIUM SERPL-MCNC: 9.3 MG/DL (ref 8.4–10.2)
CHLORIDE SERPL-SCNC: 104 MMOL/L (ref 98–107)
CHOLEST SERPL-MCNC: 130 MG/DL
CHOLEST/HDLC SERPL: 1 {RATIO} (ref 0–5)
CO2 SERPL-SCNC: 30 MMOL/L (ref 22–29)
CREAT SERPL-MCNC: 0.72 MG/DL (ref 0.55–1.02)
CREAT UR-MCNC: 60.2 MG/DL (ref 45–106)
EOSINOPHIL # BLD AUTO: 0.1 X10(3)/MCL (ref 0–0.9)
EOSINOPHIL NFR BLD AUTO: 1 %
ERYTHROCYTE [DISTWIDTH] IN BLOOD BY AUTOMATED COUNT: 12.7 % (ref 11.5–17)
EST. AVERAGE GLUCOSE BLD GHB EST-MCNC: 197.2 MG/DL
GLOBULIN SER-MCNC: 3.4 GM/DL (ref 2.4–3.5)
GLUCOSE SERPL-MCNC: 102 MG/DL (ref 74–100)
HBA1C MFR BLD: 8.5 %
HCT VFR BLD AUTO: 42.1 % (ref 37–47)
HDLC SERPL-MCNC: 91 MG/DL (ref 35–60)
HGB BLD-MCNC: 13.1 GM/DL (ref 12–16)
IMM GRANULOCYTES # BLD AUTO: 0.01 % (ref 0–0.02)
IMM GRANULOCYTES NFR BLD AUTO: 0.1 % (ref 0–0.43)
LDLC SERPL CALC-MCNC: 32 MG/DL (ref 50–140)
LYMPHOCYTES # BLD AUTO: 3.4 X10(3)/MCL (ref 0.6–4.6)
LYMPHOCYTES NFR BLD AUTO: 39 %
MCH RBC QN AUTO: 27.5 PG (ref 27–31)
MCHC RBC AUTO-ENTMCNC: 31.1 GM/DL (ref 33–36)
MCV RBC AUTO: 88.4 FL (ref 80–94)
MICROALBUMIN UR-MCNC: 15.8 UG/ML
MICROALBUMIN/CREAT RATIO PNL UR: 26.2 MG/GM CR (ref 0–30)
MONOCYTES # BLD AUTO: 0.4 X10(3)/MCL (ref 0.1–1.3)
MONOCYTES NFR BLD AUTO: 5 %
NEUTROPHILS # BLD AUTO: 4.67 X10(3)/MCL (ref 1.4–7.9)
NEUTROPHILS NFR BLD AUTO: 54 %
PLATELET # BLD AUTO: 258 X10(3)/MCL (ref 130–400)
PMV BLD AUTO: 9.7 FL (ref 9.4–12.4)
POTASSIUM SERPL-SCNC: 4 MMOL/L (ref 3.5–5.1)
PROT SERPL-MCNC: 7.4 GM/DL (ref 6.4–8.3)
RBC # BLD AUTO: 4.76 X10(6)/MCL (ref 4.2–5.4)
SODIUM SERPL-SCNC: 144 MMOL/L (ref 136–145)
T3FREE SERPL-MCNC: 2.93 PG/ML (ref 1.58–3.91)
T4 FREE SERPL-MCNC: 0.88 NG/DL (ref 0.7–1.48)
TRIGL SERPL-MCNC: 37 MG/DL (ref 37–140)
TSH SERPL-ACNC: 3.72 UIU/ML (ref 0.35–4.94)
VLDLC SERPL CALC-MCNC: 7 MG/DL
WBC # SPEC AUTO: 8.6 X10(3)/MCL (ref 4.5–11.5)

## 2021-10-11 ENCOUNTER — HISTORICAL (OUTPATIENT)
Dept: RADIOLOGY | Facility: HOSPITAL | Age: 56
End: 2021-10-11

## 2021-10-12 ENCOUNTER — HISTORICAL (OUTPATIENT)
Dept: ADMINISTRATIVE | Facility: HOSPITAL | Age: 56
End: 2021-10-12

## 2021-10-12 LAB
APPEARANCE, UA: ABNORMAL
BACTERIA #/AREA URNS AUTO: ABNORMAL /HPF
BILIRUB SERPL-MCNC: NEGATIVE MG/DL
BILIRUB UR QL STRIP: NEGATIVE
BLOOD URINE, POC: NORMAL
CLARITY, POC UA: NORMAL
COLOR UR: YELLOW
COLOR, POC UA: NORMAL
GLUCOSE (UA): >1000 MG/DL
GLUCOSE UR QL STRIP: NORMAL
HGB UR QL STRIP: 0.06 MG/DL
HYALINE CASTS #/AREA URNS LPF: ABNORMAL /LPF
KETONES UR QL STRIP: NEGATIVE
KETONES UR QL STRIP: NEGATIVE
LEUKOCYTE EST, POC UA: NORMAL
LEUKOCYTE ESTERASE UR QL STRIP: 500 LEU/UL
NITRITE UR QL STRIP: NEGATIVE
NITRITE, POC UA: NEGATIVE
PH UR STRIP: 5 [PH] (ref 4.5–8)
PH, POC UA: 5
PROT UR QL STRIP: 30 MG/DL
PROTEIN, POC: NORMAL
RBC #/AREA URNS AUTO: ABNORMAL /HPF
SP GR UR STRIP: 1.03 (ref 1–1.03)
SQUAMOUS #/AREA URNS LPF: ABNORMAL /LPF
UROBILINOGEN UR STRIP-ACNC: NORMAL
UROBILINOGEN, POC UA: NORMAL
WBC #/AREA URNS AUTO: >=100 /HPF

## 2021-10-24 LAB
COLOR STL: NORMAL
CONSISTENCY STL: NORMAL
HEMOCCULT SP2 STL QL: NEGATIVE

## 2021-10-25 ENCOUNTER — HISTORICAL (OUTPATIENT)
Dept: LAB | Facility: HOSPITAL | Age: 56
End: 2021-10-25

## 2021-10-25 LAB
COLOR STL: NORMAL
CONSISTENCY STL: NORMAL

## 2021-11-23 ENCOUNTER — HISTORICAL (OUTPATIENT)
Dept: ADMINISTRATIVE | Facility: HOSPITAL | Age: 56
End: 2021-11-23

## 2021-11-23 LAB
ABS NEUT (OLG): 3.94 X10(3)/MCL (ref 2.1–9.2)
ALBUMIN SERPL-MCNC: 4 GM/DL (ref 3.5–5)
ALBUMIN/GLOB SERPL: 1.2 RATIO (ref 1.1–2)
ALP SERPL-CCNC: 92 UNIT/L (ref 40–150)
ALT SERPL-CCNC: 24 UNIT/L (ref 0–55)
AST SERPL-CCNC: 24 UNIT/L (ref 5–34)
BASOPHILS # BLD AUTO: 0 X10(3)/MCL (ref 0–0.2)
BASOPHILS NFR BLD AUTO: 1 %
BILIRUB SERPL-MCNC: 0.4 MG/DL
BILIRUBIN DIRECT+TOT PNL SERPL-MCNC: 0.1 MG/DL (ref 0–0.5)
BILIRUBIN DIRECT+TOT PNL SERPL-MCNC: 0.3 MG/DL (ref 0–0.8)
BUN SERPL-MCNC: 11.3 MG/DL (ref 9.8–20.1)
CALCIUM SERPL-MCNC: 9.8 MG/DL (ref 8.7–10.5)
CHLORIDE SERPL-SCNC: 106 MMOL/L (ref 98–107)
CHOLEST SERPL-MCNC: 214 MG/DL
CHOLEST/HDLC SERPL: 3 {RATIO} (ref 0–5)
CO2 SERPL-SCNC: 29 MMOL/L (ref 22–29)
CREAT SERPL-MCNC: 0.69 MG/DL (ref 0.55–1.02)
CREAT UR-MCNC: 183.3 MG/DL (ref 45–106)
DEPRECATED CALCIDIOL+CALCIFEROL SERPL-MC: 26.8 NG/ML (ref 30–80)
EOSINOPHIL # BLD AUTO: 0.1 X10(3)/MCL (ref 0–0.9)
EOSINOPHIL NFR BLD AUTO: 1 %
ERYTHROCYTE [DISTWIDTH] IN BLOOD BY AUTOMATED COUNT: 12.7 % (ref 11.5–14.5)
EST. AVERAGE GLUCOSE BLD GHB EST-MCNC: 240.3 MG/DL
GLOBULIN SER-MCNC: 3.4 GM/DL (ref 2.4–3.5)
GLUCOSE SERPL-MCNC: 155 MG/DL (ref 74–100)
HBA1C MFR BLD: 10 %
HCT VFR BLD AUTO: 40.2 % (ref 35–46)
HDLC SERPL-MCNC: 76 MG/DL (ref 35–60)
HGB BLD-MCNC: 12.7 GM/DL (ref 12–16)
IMM GRANULOCYTES # BLD AUTO: 0.01 10*3/UL
IMM GRANULOCYTES NFR BLD AUTO: 0 %
LDLC SERPL CALC-MCNC: 123 MG/DL (ref 50–140)
LYMPHOCYTES # BLD AUTO: 3.6 X10(3)/MCL (ref 0.6–4.6)
LYMPHOCYTES NFR BLD AUTO: 44 %
MCH RBC QN AUTO: 28.5 PG (ref 26–34)
MCHC RBC AUTO-ENTMCNC: 31.6 GM/DL (ref 31–37)
MCV RBC AUTO: 90.1 FL (ref 80–100)
MICROALBUMIN UR-MCNC: 110.1 MG/L
MICROALBUMIN/CREAT RATIO PNL UR: 60.1 MG/GM CR (ref 0–30)
MONOCYTES # BLD AUTO: 0.4 X10(3)/MCL (ref 0.1–1.3)
MONOCYTES NFR BLD AUTO: 5 %
NEUTROPHILS # BLD AUTO: 3.94 X10(3)/MCL (ref 2.1–9.2)
NEUTROPHILS NFR BLD AUTO: 49 %
NRBC BLD AUTO-RTO: 0 % (ref 0–0.2)
PLATELET # BLD AUTO: 247 X10(3)/MCL (ref 130–400)
PMV BLD AUTO: 10.3 FL (ref 7.4–10.4)
POTASSIUM SERPL-SCNC: 4 MMOL/L (ref 3.5–5.1)
PROT SERPL-MCNC: 7.4 GM/DL (ref 6.4–8.3)
RBC # BLD AUTO: 4.46 X10(6)/MCL (ref 4–5.2)
SODIUM SERPL-SCNC: 141 MMOL/L (ref 136–145)
T3FREE SERPL-MCNC: 2.57 PG/ML (ref 1.71–3.71)
T4 FREE SERPL-MCNC: 0.93 NG/DL (ref 0.7–1.48)
TRIGL SERPL-MCNC: 73 MG/DL (ref 37–140)
TSH SERPL-ACNC: 2.07 UIU/ML (ref 0.35–4.94)
VLDLC SERPL CALC-MCNC: 15 MG/DL
WBC # SPEC AUTO: 8.1 X10(3)/MCL (ref 4.5–11)

## 2021-12-07 ENCOUNTER — HISTORICAL (OUTPATIENT)
Dept: RADIOLOGY | Facility: HOSPITAL | Age: 56
End: 2021-12-07

## 2021-12-29 ENCOUNTER — HISTORICAL (OUTPATIENT)
Dept: PREADMISSION TESTING | Facility: HOSPITAL | Age: 56
End: 2021-12-29

## 2021-12-29 LAB
ABS NEUT (OLG): 4.62 X10(3)/MCL (ref 2.1–9.2)
BASOPHILS # BLD AUTO: 0 X10(3)/MCL (ref 0–0.2)
BASOPHILS NFR BLD AUTO: 1 %
BUN SERPL-MCNC: 16.3 MG/DL (ref 9.8–20.1)
CALCIUM SERPL-MCNC: 9.5 MG/DL (ref 8.7–10.5)
CHLORIDE SERPL-SCNC: 104 MMOL/L (ref 98–107)
CO2 SERPL-SCNC: 28 MMOL/L (ref 22–29)
CREAT SERPL-MCNC: 0.77 MG/DL (ref 0.55–1.02)
CREAT/UREA NIT SERPL: 21
EOSINOPHIL # BLD AUTO: 0.1 X10(3)/MCL (ref 0–0.9)
EOSINOPHIL NFR BLD AUTO: 1 %
ERYTHROCYTE [DISTWIDTH] IN BLOOD BY AUTOMATED COUNT: 13 % (ref 11.5–17)
GLUCOSE SERPL-MCNC: 141 MG/DL (ref 74–100)
HCT VFR BLD AUTO: 42.6 % (ref 37–47)
HGB BLD-MCNC: 13.2 GM/DL (ref 12–16)
LYMPHOCYTES # BLD AUTO: 2.8 X10(3)/MCL (ref 0.6–4.6)
LYMPHOCYTES NFR BLD AUTO: 35 %
MCH RBC QN AUTO: 27.6 PG (ref 27–31)
MCHC RBC AUTO-ENTMCNC: 31 GM/DL (ref 33–36)
MCV RBC AUTO: 89.1 FL (ref 80–94)
MONOCYTES # BLD AUTO: 0.4 X10(3)/MCL (ref 0.1–1.3)
MONOCYTES NFR BLD AUTO: 5 %
NEUTROPHILS # BLD AUTO: 4.62 X10(3)/MCL (ref 2.1–9.2)
NEUTROPHILS NFR BLD AUTO: 58 %
PLATELET # BLD AUTO: 227 X10(3)/MCL (ref 130–400)
PMV BLD AUTO: 10.5 FL (ref 9.4–12.4)
POTASSIUM SERPL-SCNC: 4.2 MMOL/L (ref 3.5–5.1)
RBC # BLD AUTO: 4.78 X10(6)/MCL (ref 4.2–5.4)
SODIUM SERPL-SCNC: 142 MMOL/L (ref 136–145)
WBC # SPEC AUTO: 8 X10(3)/MCL (ref 4.5–11.5)

## 2022-01-06 ENCOUNTER — HISTORICAL (OUTPATIENT)
Dept: SURGERY | Facility: HOSPITAL | Age: 57
End: 2022-01-06

## 2022-01-20 ENCOUNTER — HISTORICAL (OUTPATIENT)
Dept: SURGERY | Facility: HOSPITAL | Age: 57
End: 2022-01-20

## 2022-01-31 ENCOUNTER — HISTORICAL (OUTPATIENT)
Dept: PREADMISSION TESTING | Facility: HOSPITAL | Age: 57
End: 2022-01-31

## 2022-01-31 LAB
ABS NEUT (OLG): 4.67 (ref 2.1–9.2)
BASOPHILS # BLD AUTO: 0.1 10*3/UL (ref 0–0.2)
BASOPHILS NFR BLD AUTO: 1 %
BUN SERPL-MCNC: 13.4 MG/DL (ref 9.8–20.1)
CALCIUM SERPL-MCNC: 9.8 MG/DL (ref 8.7–10.5)
CHLORIDE SERPL-SCNC: 103 MMOL/L (ref 98–107)
CO2 SERPL-SCNC: 28 MMOL/L (ref 22–29)
CREAT SERPL-MCNC: 0.79 MG/DL (ref 0.55–1.02)
CREAT/UREA NIT SERPL: 17
EOSINOPHIL # BLD AUTO: 0.1 10*3/UL (ref 0–0.9)
EOSINOPHIL NFR BLD AUTO: 1 %
ERYTHROCYTE [DISTWIDTH] IN BLOOD BY AUTOMATED COUNT: 13 % (ref 11.5–17)
GLUCOSE SERPL-MCNC: 164 MG/DL (ref 74–100)
HCT VFR BLD AUTO: 43.4 % (ref 37–47)
HEMOLYSIS INTERF INDEX SERPL-ACNC: 6
HGB BLD-MCNC: 13.1 G/DL (ref 12–16)
ICTERIC INTERF INDEX SERPL-ACNC: 0
LIPEMIC INTERF INDEX SERPL-ACNC: 9
LYMPHOCYTES # BLD AUTO: 3 10*3/UL (ref 0.6–4.6)
LYMPHOCYTES NFR BLD AUTO: 36 %
MANUAL DIFF? (OHS): NO
MCH RBC QN AUTO: 27.6 PG (ref 27–31)
MCHC RBC AUTO-ENTMCNC: 30.2 G/DL (ref 33–36)
MCV RBC AUTO: 91.4 FL (ref 80–94)
MONOCYTES # BLD AUTO: 0.5 10*3/UL (ref 0.1–1.3)
MONOCYTES NFR BLD AUTO: 6 %
NEUTROPHILS # BLD AUTO: 4.67 10*3/UL (ref 2.1–9.2)
NEUTROPHILS NFR BLD AUTO: 56 %
PLATELET # BLD AUTO: 245 10*3/UL (ref 130–400)
PMV BLD AUTO: 10.7 FL (ref 9.4–12.4)
POTASSIUM SERPL-SCNC: 4.3 MMOL/L (ref 3.5–5.1)
RBC # BLD AUTO: 4.75 10*6/UL (ref 4.2–5.4)
SODIUM SERPL-SCNC: 141 MMOL/L (ref 136–145)
WBC # SPEC AUTO: 8.4 10*3/UL (ref 4.5–11.5)

## 2022-02-01 ENCOUNTER — HISTORICAL (OUTPATIENT)
Dept: SURGERY | Facility: HOSPITAL | Age: 57
End: 2022-02-01

## 2022-02-01 LAB
CBG: 127 (ref 70–115)
CBG: 137 (ref 70–115)
SARS-COV-2 AG RESP QL IA.RAPID: NEGATIVE

## 2022-03-16 ENCOUNTER — HISTORICAL (OUTPATIENT)
Dept: LAB | Facility: HOSPITAL | Age: 57
End: 2022-03-16

## 2022-03-16 ENCOUNTER — HISTORICAL (OUTPATIENT)
Dept: ADMINISTRATIVE | Facility: HOSPITAL | Age: 57
End: 2022-03-16

## 2022-03-16 LAB
ABS NEUT (OLG): 3.68 (ref 2.1–9.2)
ALBUMIN SERPL-MCNC: 4 G/DL (ref 3.5–5)
ALBUMIN/GLOB SERPL: 1.1 {RATIO} (ref 1.1–2)
ALP SERPL-CCNC: 100 U/L (ref 40–150)
ALT SERPL-CCNC: 31 U/L (ref 0–55)
AST SERPL-CCNC: 31 U/L (ref 5–34)
BASOPHILS # BLD AUTO: 0 10*3/UL (ref 0–0.2)
BASOPHILS NFR BLD AUTO: 0 %
BILIRUB SERPL-MCNC: 0.5 MG/DL
BILIRUBIN DIRECT+TOT PNL SERPL-MCNC: 0.2 (ref 0–0.5)
BILIRUBIN DIRECT+TOT PNL SERPL-MCNC: 0.3 (ref 0–0.8)
BUN SERPL-MCNC: 10.7 MG/DL (ref 9.8–20.1)
CALCIUM SERPL-MCNC: 9.9 MG/DL (ref 8.7–10.5)
CHLORIDE SERPL-SCNC: 105 MMOL/L (ref 98–107)
CHOLEST SERPL-MCNC: 168 MG/DL
CHOLEST/HDLC SERPL: 3 {RATIO} (ref 0–5)
CO2 SERPL-SCNC: 29 MMOL/L (ref 22–29)
CREAT SERPL-MCNC: 0.68 MG/DL (ref 0.55–1.02)
CREAT UR-MCNC: 79.4 MG/DL (ref 45–106)
EOSINOPHIL # BLD AUTO: 0.1 10*3/UL (ref 0–0.9)
EOSINOPHIL NFR BLD AUTO: 1 %
ERYTHROCYTE [DISTWIDTH] IN BLOOD BY AUTOMATED COUNT: 12.7 % (ref 11.5–17)
EST. AVERAGE GLUCOSE BLD GHB EST-MCNC: 208.7 MG/DL
GLOBULIN SER-MCNC: 3.8 G/DL (ref 2.4–3.5)
GLUCOSE SERPL-MCNC: 165 MG/DL (ref 74–100)
HBA1C MFR BLD: 8.9 %
HCT VFR BLD AUTO: 43.8 % (ref 37–47)
HDLC SERPL-MCNC: 66 MG/DL (ref 35–60)
HEMOLYSIS INTERF INDEX SERPL-ACNC: 3
HGB BLD-MCNC: 13.4 G/DL (ref 12–16)
ICTERIC INTERF INDEX SERPL-ACNC: 0
LDLC SERPL CALC-MCNC: 90 MG/DL (ref 50–140)
LIPEMIC INTERF INDEX SERPL-ACNC: 0
LYMPHOCYTES # BLD AUTO: 2.8 10*3/UL (ref 0.6–4.6)
LYMPHOCYTES NFR BLD AUTO: 40 %
MANUAL DIFF? (OHS): NO
MCH RBC QN AUTO: 27.7 PG (ref 27–31)
MCHC RBC AUTO-ENTMCNC: 30.6 G/DL (ref 33–36)
MCV RBC AUTO: 90.7 FL (ref 80–94)
MICROALBUMIN UR-MCNC: 122.6
MICROALBUMIN/CREAT RATIO PNL UR: 154.4 (ref 0–30)
MONOCYTES # BLD AUTO: 0.4 10*3/UL (ref 0.1–1.3)
MONOCYTES NFR BLD AUTO: 6 %
NEUTROPHILS # BLD AUTO: 3.68 10*3/UL (ref 1.4–7.9)
NEUTROPHILS NFR BLD AUTO: 52 %
PLATELET # BLD AUTO: 250 10*3/UL (ref 130–400)
PMV BLD AUTO: 10.6 FL (ref 9.4–12.4)
POTASSIUM SERPL-SCNC: 4 MMOL/L (ref 3.5–5.1)
PROT SERPL-MCNC: 7.8 G/DL (ref 6.4–8.3)
RBC # BLD AUTO: 4.83 10*6/UL (ref 4.2–5.4)
SODIUM SERPL-SCNC: 143 MMOL/L (ref 136–145)
T3FREE SERPL-MCNC: 2.33 PG/ML (ref 1.58–3.91)
T4 FREE SERPL-MCNC: 0.79 NG/DL (ref 0.7–1.48)
TRIGL SERPL-MCNC: 60 MG/DL (ref 37–140)
TSH SERPL-ACNC: 1.39 M[IU]/L (ref 0.35–4.94)
VIT B12 SERPL-MCNC: 376 PG/ML (ref 213–816)
VLDLC SERPL CALC-MCNC: 12 MG/DL
WBC # SPEC AUTO: 7 10*3/UL (ref 4.5–11.5)

## 2022-04-07 ENCOUNTER — HISTORICAL (OUTPATIENT)
Dept: ADMINISTRATIVE | Facility: HOSPITAL | Age: 57
End: 2022-04-07

## 2022-04-07 ENCOUNTER — HISTORICAL (OUTPATIENT)
Dept: SURGERY | Facility: HOSPITAL | Age: 57
End: 2022-04-07

## 2022-04-07 LAB
ABS NEUT (OLG): 4.6 (ref 1.5–6.9)
ALBUMIN SERPL-MCNC: 4.1 G/DL (ref 3.5–5)
ALBUMIN/GLOB SERPL: 1.3 {RATIO} (ref 1.1–2)
ALP SERPL-CCNC: 102 U/L (ref 40–150)
ALT SERPL-CCNC: 29 U/L (ref 0–55)
APPEARANCE, UA: CLEAR
APTT PPP: 26.9 S (ref 23.4–34.9)
AST SERPL-CCNC: 26 U/L (ref 5–34)
BACTERIA SPEC CULT: NORMAL
BASOPHILS # BLD AUTO: 0.1 10*3/UL (ref 0–0.1)
BASOPHILS NFR BLD AUTO: 1 % (ref 0–1)
BILIRUB SERPL-MCNC: 0.4 MG/DL
BILIRUB UR QL STRIP: NEGATIVE
BILIRUBIN DIRECT+TOT PNL SERPL-MCNC: 0.2 (ref 0–0.5)
BILIRUBIN DIRECT+TOT PNL SERPL-MCNC: 0.2 (ref 0–0.8)
BUN SERPL-MCNC: 15 MG/DL (ref 9.8–20.1)
CALCIUM SERPL-MCNC: 9.7 MG/DL (ref 8.7–10.5)
CHLORIDE SERPL-SCNC: 105 MMOL/L (ref 98–107)
CO2 SERPL-SCNC: 28 MMOL/L (ref 22–29)
COLOR UR: YELLOW
CREAT SERPL-MCNC: 0.92 MG/DL (ref 0.55–1.02)
DO MICRO?: YES
EOSINOPHIL # BLD AUTO: 0.2 10*3/UL (ref 0–0.6)
EOSINOPHIL NFR BLD AUTO: 2 % (ref 0–5)
ERYTHROCYTE [DISTWIDTH] IN BLOOD BY AUTOMATED COUNT: 12.9 % (ref 11.5–17)
GLOBULIN SER-MCNC: 3.2 G/DL (ref 2.4–3.5)
GLUCOSE (UA): >=1000
GLUCOSE SERPL-MCNC: 210 MG/DL (ref 74–100)
HCT VFR BLD AUTO: 43.5 % (ref 36–48)
HEMOLYSIS INTERF INDEX SERPL-ACNC: 2
HGB BLD-MCNC: 13.5 G/DL (ref 12–16)
HGB UR QL STRIP: NORMAL
ICTERIC INTERF INDEX SERPL-ACNC: 0
IMM GRANULOCYTES # BLD AUTO: 0.02 10*3/UL (ref 0–0.02)
IMM GRANULOCYTES NFR BLD AUTO: 0.2 % (ref 0–0.43)
INR PPP: 1 (ref 2–3)
KETONES UR QL STRIP: NEGATIVE
LEUKOCYTE ESTERASE UR QL STRIP: NEGATIVE
LIPEMIC INTERF INDEX SERPL-ACNC: 2
LYMPHOCYTES # BLD AUTO: 2.8 10*3/UL (ref 0.5–4.1)
LYMPHOCYTES NFR BLD AUTO: 34 % (ref 15–40)
MANUAL DIFF? (OHS): NO
MCH RBC QN AUTO: 28 PG (ref 27–34)
MCHC RBC AUTO-ENTMCNC: 31 G/DL (ref 31–36)
MCV RBC AUTO: 89 FL (ref 80–99)
MONOCYTES # BLD AUTO: 0.5 10*3/UL (ref 0–1.1)
MONOCYTES NFR BLD AUTO: 6 % (ref 4–12)
NEUTROPHILS # BLD AUTO: 4.6 10*3/UL (ref 1.5–6.9)
NEUTROPHILS NFR BLD AUTO: 56 % (ref 43–75)
NITRITE UR QL STRIP: NEGATIVE
PH UR STRIP: 5 [PH] (ref 4.6–8)
PLATELET # BLD AUTO: 274 10*3/UL (ref 140–400)
PMV BLD AUTO: 10.2 FL (ref 6.8–10)
POTASSIUM SERPL-SCNC: 4 MMOL/L (ref 3.5–5.1)
PROT SERPL-MCNC: 7.3 G/DL (ref 6.4–8.3)
PROT UR QL STRIP: NEGATIVE
PROTHROMBIN TIME: 13 S (ref 11.7–14.5)
RBC # BLD AUTO: 4.9 10*6/UL (ref 4.2–5.4)
RBC #/AREA URNS HPF: NORMAL /[HPF] (ref 0–2)
SODIUM SERPL-SCNC: 142 MMOL/L (ref 136–145)
SP GR UR STRIP: 1.01 (ref 1–1.03)
SQUAMOUS EPITHELIAL, UA: NORMAL
UROBILINOGEN UR STRIP-ACNC: 0.2
WBC # SPEC AUTO: 8.1 10*3/UL (ref 4.5–11.5)
WBC #/AREA URNS HPF: NORMAL /[HPF] (ref 0–2)

## 2022-04-10 ENCOUNTER — HISTORICAL (OUTPATIENT)
Dept: ADMINISTRATIVE | Facility: HOSPITAL | Age: 57
End: 2022-04-10
Payer: MEDICARE

## 2022-04-11 ENCOUNTER — HISTORICAL (OUTPATIENT)
Dept: ANESTHESIOLOGY | Facility: HOSPITAL | Age: 57
End: 2022-04-11

## 2022-04-11 ENCOUNTER — HISTORICAL (OUTPATIENT)
Dept: ADMINISTRATIVE | Facility: HOSPITAL | Age: 57
End: 2022-04-11
Payer: MEDICARE

## 2022-04-11 LAB — CRC RECOMMENDATION EXT: NORMAL

## 2022-04-16 ENCOUNTER — HISTORICAL (OUTPATIENT)
Dept: ADMINISTRATIVE | Facility: HOSPITAL | Age: 57
End: 2022-04-16

## 2022-04-28 VITALS
BODY MASS INDEX: 35.93 KG/M2 | SYSTOLIC BLOOD PRESSURE: 128 MMHG | DIASTOLIC BLOOD PRESSURE: 70 MMHG | OXYGEN SATURATION: 99 % | HEIGHT: 65 IN | WEIGHT: 215.63 LBS

## 2022-04-28 VITALS
WEIGHT: 215.63 LBS | BODY MASS INDEX: 35.93 KG/M2 | OXYGEN SATURATION: 99 % | HEIGHT: 65 IN | DIASTOLIC BLOOD PRESSURE: 70 MMHG | SYSTOLIC BLOOD PRESSURE: 128 MMHG

## 2022-05-02 DIAGNOSIS — E11.65 UNCONTROLLED TYPE 2 DIABETES MELLITUS WITH HYPERGLYCEMIA: Primary | ICD-10-CM

## 2022-05-03 NOTE — HISTORICAL [facility] CERNER
This is a historical note converted from Yadira. Formatting and pictures may have been removed.  Please reference Yadira for original formatting and attached multimedia. Chief Complaint  2mnth f/u for DM,HTN  History of Present Illness  Pt is a?54 years?old female with a ?past medical history of?diabetes type 2, hypertension, hyperlipidemia,?pulmonary emboli, hypothyroidism., osteopenia, and? obesity status post gastric sleeve.?Pt states that she is having cervical surgery by Dr. Matthieu Parada on 2/12/2020. Reviewed labs from 1/7/2020 and 1/20/2020.  ?   Acute  Foul smelling urine- Urine dip stick detected positive nitrites. Pt denies dysuria, frequency or urgency.  ?   Chronic issues?  ?   Muscle ache/ Vitamin ?D deficiency/ Right leg pain: Pt had hip replacement in right leg with muscle pain in that leg. Vitamin D (1/30/2020) 43.62. Currently on vit D supplements. ?The pain in her right leg?at the level of the quadriceps tendon; worse with walking and ?by touching?the area.  ?   Rt De Quervains Tenosynovitis: S/p CIS for Quervains Tenosynovitis of right hand. ?Patient states the pain?in the right?thumb resolving.  ?   DM2: HgA1c 7.6. (1/7/2020)???Pt not checking CBS. Pt is currently on Trulicity and Jardiance. Pt is getting better with compliance with ADA diet and exercise. Pt no longer eating sweets? and junk food. Pt has diabetic neuropathy in hands and feet on gabapentin. ?Morbid conditions include?hypertension, hyperlipidemia, and hypothyroidism.  ?   HTN: Controlled on chlorthalidone and lisinopril. Pt is compliant with low sodium diet and exercise. BP? low normal 127/71.  ?   Hypothyroidism: Thyroid function tests (1/30/2020): TSH? was 1.684, free T4 was 1.10, free T3? was 2.4.? Patient denies fatigue, palpitations,?constipation, or mood lability.  ?  HLD: FLP (1/30/2020): Total cholesterol 115, LDL 29, triglyceride 40, HDL 78. Pt adheres to low fat eating habit. Pt currently on Repatha.  ?  Osteopenia:  Bone density complete scan depicted Osteopenia. Moderately increased fracture risk. T-score -1.0 radius distal third left and -1.7 radius distal third right.  ?  Patient denies chest pain, shortness of breath,?dyspnea on exertion, palpitations, peripheral edema,?abdominal pain, nausea, vomiting,?diarrhea,?constipation, fatigue, fever, chills,?dysuria,? hematuria, melena,?or hematochezia.  ?   Specialists:  Pain management: Dr. Garner  Cardiologist: [facility]  Podiatrist: Anmol Foot  Opthalmologist: Dr. Cordova  ?  Review of Systems  GENERAL:?Negative for abnormal weight loss or weight gain, fatigue, fever, or chills. Negative except for stated in HPI.  HEENT:??Negative for head trauma, visual disturbances,? nasal congestion, sore throat, or hearing loss. Negative except for stated in HPI.  CARDIOVASCULAR: Negative for? chest pain, palpitations, DUFFY, or syncope. Negative except for stated in HPI.  RESPIRATORY: Negative for SOB, DUFFY, cough, or?wheezing.? Negative except for stated in HPI.  GASTROINTESTINAL: Negative for abdominal pain, nausea,??vomiting, diarrhea, constipation, heartburn, ?hematochezia,? or melena.? Negative except for stated in HPI.  GENITOURINARY: Negative for dysuria, hematuria, urinary frequency, urinary urgency, urinary hesitancy or flank pain. Negative except for stated in HPI.  ENDOCRINE: Negative for fatigue, heat intolerance, cold intolerance, polyuria, polydipsia, or polyphagia.? Negative except for stated in HPI.  PSYCHOLOGICAL: Negative for depression, anxiety, suicidal or homicidal ideations,??or?wanda.? Negative except for?stated in HPI.  MUSCULOSKELETAL:?Negative except for?stated in HPI.  INTEGUMENT: Negative for rash?or lesions. Negative except for stated in HPI.  NEUROLOGY: Negative for headaches, dizziness, numbness, tingling , syncope, vertigo, ?or gait disturbances. Negative except for stated in HPI.  ?  Physical Exam  Vitals & Measurements  T:?36.5? ?C (Oral)?  HR:?57(Peripheral)? RR:?20? BP:?127/71? SpO2:?98%?  HT:?165?cm? WT:?99.1?kg?  GENERAL:?Alert and oriented to person, place, time and situation,?NAD  HEENT: NCAT, Pupils equally round and reactive to light accommodation,?normal sclera, ?moist mucous membranes,?oropharynx normal,?normal nasal turbinates/ nares,??TM clear bilaterally, neck?supple,?thyroid normal,?no lymphadenopathy.  CARDIOVASCULAR:?Regular rateand?rhythm,? S1 and S2 normal;?no murmurs, no rubs, or no gallops; no carotid bruit.  RESPIRATORY:??Lungs clear to auscultation bilaterally;?no wheezes,?no rhonchi,? orno crackles  ABDOMEN: Soft/ Nontender/ Nondistended; Bowel Sounds x4 - normoactive; no abdominal pulsatile mass, no masses, no hernias  EXTREMITY:? No clubbing, no cyanosis and no edema; Dorsalis pedis and tibial ?pulses 2+  MUSCULOSKELETAL: FROM of Upper and Lower extremities; Strength 5/5 of Upper and Lower extremities. TTP of the quadriceps muscle at the tendon as it leaves the muscle and inserts into the knee.  PSYCHOLOGICAL: Good judgement and insight; normal affect and mood.  NEUROLOGICAL: Normal gait and ?normal sensation;?no focal deficits; Cranial Nerves 2 -12 grossly intact  ?  Assessment/Plan  1.?DM (diabetes mellitus)?E11.9  ?Goal HgA1c <7  Home CBS fasting:  Continue to monitor CBS daily.  HGA1c: 7.6 (1/7/2020)  Urine microalbumin: ordered today  Diabetic foot exam: normal sensation and proprioception, minor left foot callus. Pt sees her podiatrist every 6 months  Diabetic eye exam: [place] 2019 had cataract surgery. Pt agrees to sign medical release to get Ophthalmology exam  Statin therapy: Repatha and Zetia  ASA 81 mg po daily  Regimen/Continue  -Trulicity 1.5 mg / 0.5 milliliters?inject 0.5 mL subcutaneously?every morning  -Jardiance 5 mg taking 1 tab?p.o. every morning  Refill Jardiance given.  Ordered:  empagliflozin, 25 mg = 1 tab(s), Oral, qAM, # 30 tab(s), 3 Refill(s), Pharmacy: [facility], L, 165, cm,  Height/Length Dosing, 02/04/20 9:15:00 CST, 99.1, kg, Weight Dosing, 02/04/20 9:15:00 CST  Clinic Follow up, *Est. 03/17/20 9:30:00 CDT, Order for future visit, Diabetic neuropathy, [facility]  Office/Outpatient Visit Level 5 Established 60571 PC, DM (diabetes mellitus)  Diabetic neuropathy  HTN (hypertension)  Hyperlipidemia  Hypothyroidism  De Quervains tenosynovitis  Tendonitis of other site  Vitamin D deficiency  Osteopenia  UTI (urinary tract infection), bacterial, [facility],...  Request Eye Exam Results, 02/04/20 10:11:00 CST, please obtain DM eye exam from [facility], DM (diabetes mellitus)  ?  2.?Diabetic neuropathy?E11.40  Continue gabapentin (see below)  Refill given  Ordered:  gabapentin, 300 mg = 1 cap(s), Oral, qPM, # 30 cap(s), 3 Refill(s), Pharmacy: [facility], L, 165, cm, Height/Length Dosing, 02/04/20 9:15:00 CST, 99.1, kg, Weight Dosing, 02/04/20 9:15:00 CST  Clinic Follow up, *Est. 03/17/20 9:30:00 CDT, Order for future visit, Diabetic neuropathy, [facility]  Office/Outpatient Visit Level 5 Established [id number] PC, DM (diabetes mellitus)  Diabetic neuropathy  HTN (hypertension)  Hyperlipidemia  Hypothyroidism  De Quervains tenosynovitis  Tendonitis of other site  Vitamin D deficiency  Osteopenia  UTI (urinary tract infection), bacterial, [facility],...  ?  3.?HTN (hypertension)?I10  Goal BP <130/80.? Goal Met.  /71.  Encourage low sodium eating habit of ?less than 2 grams/ day  Encouraged eating vegetables, fruits,?and whole grains; including??low-fat?dairy, poultry, fish?legumes, nuts and non-tropical vegetables oils. Limit sweets,?sugar-sweetened?beverages, and red meats.  Encourage exercise for 40 minutes at least 3 to 4 ?times a week  Continue  -chlorthalidone 25 mg to 0.5 mg tab daily.  -lisinopril 2.5 mg po daily.  Refill given of lisinopril  Ordered:  lisinopril, 2.5 mg = 1 tab(s), Oral, qAM, # 30 tab(s), 3  Refill(s), Pharmacy: [facility], L, 165, cm, Height/Length Dosing, 02/04/20 9:15:00 CST, 99.1, kg, Weight Dosing, 02/04/20 9:15:00 CST  Clinic Follow up, *Est. 03/17/20 9:30:00 CDT, Order for future visit, Diabetic neuropathy, [facility]  Office/Outpatient Visit Level 5 Established 49971 PC, DM (diabetes mellitus)  Diabetic neuropathy  HTN (hypertension)  Hyperlipidemia  Hypothyroidism  De Quervains tenosynovitis  Tendonitis of other site  Vitamin D deficiency  Osteopenia  UTI (urinary tract infection), bacterial, [facility],...  ?  4.?Mixed hyperlipidemia?E78.2  Goal LDL < 100 and TChol <200  Encouraged low fat diet.  Encouraged exercise? for 40 minutes at least 3 to 4 times/ week.  Continue  -Repatha?420 mg?/ 3 mLs?to inject 3.5 mLs?subcutaneously monthly  -Zetia 10 mg?to take 1 tab p.o. every morning  Refills given  ?  5.?Hypothyroidism?E03.9  Thyroid function test within normal limits?(1/30/2020)  Continue?levothyroxine 50 mcg p.o. daily  Refill given  Ordered:  levothyroxine, 50 mcg = 1 tab(s), Oral, qAM, # 30 tab(s), 3 Refill(s), Pharmacy: [facility], L, 165, cm, Height/Length Dosing, 02/04/20 9:15:00 CST, 99.1, kg, Weight Dosing, 02/04/20 9:15:00 CST  Clinic Follow up, *Est. 03/17/20 9:30:00 CDT, Order for future visit, Diabetic neuropathy, [facility]  Office/Outpatient Visit Level 5 Established [id number] PC, DM (diabetes mellitus)  Diabetic neuropathy  HTN (hypertension)  Hyperlipidemia  Hypothyroidism  De Quervains tenosynovitis  Tendonitis of other site  Vitamin D deficiency  Osteopenia  UTI (urinary tract infection), bacterial, [facility],...  ?  6.?De Quervains tenosynovitis?M65.4  ?Follow up with [facility]  Ordered:  Clinic Follow up, *Est. 03/17/20 9:30:00 CDT, Order for future visit, Diabetic neuropathy, [facility]  Office/Outpatient Visit Level 5 Established 43214 PC, DM (diabetes  mellitus)  Diabetic neuropathy  HTN (hypertension)  Hyperlipidemia  Hypothyroidism  De Quervains tenosynovitis  Tendonitis of other site  Vitamin D deficiency  Osteopenia  UTI (urinary tract infection), bacterial, [facility],...  ?  7.?Tendonitis of other site?M77.9  Internal referral to?[facility] orthopedics for quadriceps tendonitis vs tendonopathy  Ordered:  Clinic Follow up, *Est. 03/17/20 9:30:00 CDT, Order for future visit, Diabetic neuropathy, [facility]  Office/Outpatient Visit Level 5 Established 26287 PC, DM (diabetes mellitus)  Diabetic neuropathy  HTN (hypertension)  Hyperlipidemia  Hypothyroidism  De Quervains tenosynovitis  Tendonitis of other site  Vitamin D deficiency  Osteopenia  UTI (urinary tract infection), bacterial, [facility],...  ?  8.?Vitamin D deficiency?E55.9  Resolving; vitamin D level 43.  Continue over-the-counter vitamin D?associated with?osteopenia?treatment  Ordered:  Clinic Follow up, *Est. 03/17/20 9:30:00 CDT, Order for future visit, Diabetic neuropathy, [facility]  Office/Outpatient Visit Level 5 Established 70814 PC, DM (diabetes mellitus)  Diabetic neuropathy  HTN (hypertension)  Hyperlipidemia  Hypothyroidism  De Quervains tenosynovitis  Tendonitis of other site  Vitamin D deficiency  Osteopenia  UTI (urinary tract infection), bacterial, [facility],...  ?  9.?Osteopenia?M85.80  Calcium ?600 mg plus vitamin D 400 mg to take 1 tab?p.o. twice daily  Ordered:  Clinic Follow up, *Est. 03/17/20 9:30:00 CDT, Order for future visit, Diabetic neuropathy, [facility]  Office/Outpatient Visit Level 5 Established 89373 PC, DM (diabetes mellitus)  Diabetic neuropathy  HTN (hypertension)  Hyperlipidemia  Hypothyroidism  De Quervains tenosynovitis  Tendonitis of other site  Vitamin D deficiency  Osteopenia  UTI (urinary tract infection), bacterial, [facility],...  ?  10.?UTI (urinary tract  infection), bacterial?N39.0  POC urine dipstick- nitrite+?, LE -  U/A complete and Urine with Cx? and Sen ordered.  Bactrim DS?taking 1 tab p.o.?twice daily x7 days?for early urinary tract infection.  Ordered:  sulfamethoxazole-trimethoprim, 1 tab(s), Oral, BID, X 7 day(s), # 14 tab(s), 0 Refill(s), Pharmacy: [facility], L, 165, cm, Height/Length Dosing, 02/04/20 9:15:00 CST, 99.1, kg, Weight Dosing, 02/04/20 9:15:00 CST  Clinic Follow up, *Est. 03/17/20 9:30:00 CDT, Order for future visit, Diabetic neuropathy, [facility]  Office/Outpatient Visit Level 5 Established 78359 PC, DM (diabetes mellitus)  Diabetic neuropathy  HTN (hypertension)  Hyperlipidemia  Hypothyroidism  De Quervains tenosynovitis  Tendonitis of other site  Vitamin D deficiency  Osteopenia  UTI (urinary tract infection), bacterial, [facility],...  Urine Culture [id number], Routine collect, 02/04/20 11:33:00 CST, Urine, Nurse collect, Stop date 02/04/20 11:33:00 CST, UTI (urinary tract infection), bacterial  ?  Orders:  calcium-vitamin D, 1 tab(s), Oral, BID, OTC with plenty of water, # 60 tab(s), 5 Refill(s), other reason (Rx)  evolocumab, 420 mg = 3.5 mL, Subcutaneous, qMonth, # 3.5 mL, 3 Refill(s), Pharmacy: [facility], L, 165, cm, Height/Length Dosing, 02/04/20 9:15:00 CST, 99.1, kg, Weight Dosing, 02/04/20 9:15:00 CST  ezetimibe, 10 mg = 1 tab(s), Oral, qAM, # 30 tab(s), 0 Refill(s), Pharmacy: [facility], L, 165, cm, Height/Length Dosing, 02/04/20 9:15:00 CST, 99.1, kg, Weight Dosing, 02/04/20 9:15:00 CST  RTC in? 6 wks DM; will be s/p cervical ?surgery   Problem List/Past Medical History  Ongoing  Acute pulmonary embolism  De Quervains tenosynovitis  Diabetic neuropathy  DM (diabetes mellitus)  Fibroid uterus  HTN (hypertension)  Hyperlipidemia  Hypothyroidism  Morbid obesity  Tendonitis of other site  Vitamin D deficiency  Historical  DVT - Deep vein  thrombosis  High blood pressure  Procedure/Surgical History  Biopsy Gastrointestinal (09/05/2019)  Esophagogastroduodenoscopy (09/05/2019)  Esophagogastroduodenoscopy, flexible, transoral; with biopsy, single or multiple (09/05/2019)  Excision of Stomach, Pylorus, Via Natural or Artificial Opening Endoscopic, Diagnostic (09/05/2019)  Reposition Left Upper Femur with Intramedullary Internal Fixation Device, Percutaneous Approach (05/03/2019)  Repair Face Skin, External Approach (05/02/2019)  Colon Tattoo (10/15/2018)  Colonoscopy (10/15/2018)  Colonoscopy, flexible; with directed submucosal injection(s), any substance (10/15/2018)  Colonoscopy, flexible; with removal of tumor(s), polyp(s), or other lesion(s) by snare technique (10/15/2018)  Excision of Ascending Colon, Via Natural or Artificial Opening Endoscopic, Diagnostic (10/15/2018)  Excision of Descending Colon, Via Natural or Artificial Opening Endoscopic, Diagnostic (10/15/2018)  Excision of Sigmoid Colon, Via Natural or Artificial Opening Endoscopic, Diagnostic (10/15/2018)  Introduction of Other Therapeutic Substance into Lower GI, Via Natural or Artificial Opening Endoscopic (10/15/2018)  Polypectomy (10/15/2018)  Drainage of Abdomen Subcutaneous Tissue and Fascia, Open Approach (08/27/2018)  Evacuation Hematoma (Left, Torso) (08/27/2018)  Incision and drainage of hematoma, seroma or fluid collection (08/27/2018)  Catheter placement in coronary artery(s) for coronary angiography, including intraprocedural injection(s) for coronary angiography, imaging supervision and interpretation; with right and left heart catheterization including intraprocedural injection(s) fo (11/14/2016)  Fluoroscopy of Left Heart using Low Osmolar Contrast (11/14/2016)  Fluoroscopy of Multiple Coronary Arteries using Low Osmolar Contrast (11/14/2016)  Measurement of Cardiac Sampling and Pressure, Bilateral, Percutaneous Approach (11/14/2016)  Transfusion of Nonautologous Red Blood  Cells into Peripheral Vein, Percutaneous Approach (10/19/2016)  Dilation of Left Common Iliac Vein with Intraluminal Device, Percutaneous Approach (08/19/2016)  Fluoroscopy of Bilateral Lower Extremity Veins using Low Osmolar Contrast (08/19/2016)  Injection procedure for extremity venography (including introduction of needle or intracatheter) (08/19/2016)  Intravascular ultrasound (noncoronary vessel) during diagnostic evaluation and/or therapeutic intervention, including radiological supervision and interpretation; initial noncoronary vessel (List separately in addition to code for primary procedure) (08/19/2016)  Transcatheter placement of an intravascular stent(s), open or percutaneous, including radiological supervision and interpretation and including angioplasty within the same vessel, when performed; initial vein (08/19/2016)  Ultrasonography of Left Lower Extremity Veins, Intravascular (08/19/2016)  Occlusion of Right Uterine Artery with Intraluminal Device, Percutaneous Approach (08/04/2016)  HOSPITAL OBSERVATION SERVICE, PER HOUR (01/19/2016)  Back  collarbone surgery  gastro sleeve  left femur fx  Left Knee meniscus repair  right hip re fx  right hip replacement   Medications  acetaminophen-hydrocodone 325 mg-10 mg oral tablet, 1 tab(s), Oral, q6hr  aspirin 81 mg oral tablet, 81 mg= 1 tab(s), Oral, qAM  chlorthalidone 25 mg oral tablet, 0.5 tab, Oral, qAM, 3 refills  Diabetic test strips, lancets, and alcohol pads, See Instructions, 3 refills  empagliflozin 25 mg oral tablet, 25 mg= 1 tab(s), Oral, qAM, 3 refills  ergocalciferol 50,000 intl units (1.25 mg) oral capsule, 27664 IntUnit= 1 cap(s), Oral, qWeek, 1 refills  ezetimibe 10 mg oral tablet, 10 mg= 1 tab(s), Oral, qAM  gabapentin 300 mg oral capsule, 300 mg= 1 cap(s), Oral, qPM, 3 refills  glucometer, See Instructions  levothyroxine 50 mcg (0.05 mg) oral tablet, 50 mcg= 1 tab(s), Oral, qAM, 3 refills  lisinopril 2.5 mg oral tablet, 2.5 mg= 1 tab(s),  Oral, qAM, 3 refills  Repatha Pushtronex 420 mg/3.5 mL subcutaneous solution, 420 mg= 3.5 mL, Subcutaneous, qMonth, 3 refills  Thumb spica splint- right, See Instructions,? ?Not taking: Last Dose Date/Time Unknown  Trulicity Pen 1.5 mg/0.5 mL subcutaneous solution, 1.5 mg= 0.5 mL, Subcutaneous, qMonday, 3 refills  Voltaren 1% topical gel, 1 huy, TOP, QID, PRN, 1 refills,? ?Not taking: did not get it fill  Allergies  Butrans?(burn)  Social History  Abuse/Neglect  No, No, Yes, 02/04/2020  Alcohol - Denies Alcohol Use, 01/19/2016  Current, 1-2 times per year, 05/03/2019  Employment/School - No Risk, 01/19/2016  Disabled, 10/08/2018  Exercise - Does not exercise, 01/19/2016  Exercise duration: 30. Exercise frequency: 1-2 times/week. Exercise type: Walking., 10/15/2019  Home/Environment - No Risk, 01/19/2016  Lives with Alone., 10/08/2018  Nutrition/Health - Medium Risk, 01/19/2016  Diabetic, Good, 10/15/2019  Sexual  Sexually active: Yes., 09/30/2019  Sexual orientation: Heterosexual., 10/08/2018  Spiritual/Cultural  Religious, No, 10/29/2019  Substance Use - Denies Substance Abuse, 01/19/2016  Never, 03/15/2018  Tobacco - Denies Tobacco Use, 06/09/2014  Former smoker, quit more than 30 days ago, No, 02/04/2020  Family History  CAD (coronary artery disease): Mother and Sister.  Cervical cancer 27-APR-2016 23:33:46<$>: Mother.  Immunizations  Vaccine Date Status Comments   influenza virus vaccine, inactivated 10/15/2019 Given    tetanus/diphtheria/pertussis, acel(Tdap) 10/15/2019 Given    pneumococcal 23-polyvalent vaccine 01/20/2016 Given New Med Order   Health Maintenance  Health Maintenance  ???Pending?(in the next year)  ??? ??OverDue  ??? ? ? ?HF-LVEF due??11/14/18??and every 2??year(s)  ??? ??Due?  ??? ? ? ?Diabetes Maintenance-Eye Exam due??02/04/20??and every?  ??? ??Due In Future?  ??? ? ? ?ADL Screening not due until??05/05/20??and every 1??year(s)  ??? ? ? ?HF-Heart Failure Education not due  until??05/06/20??and every 1??year(s)  ??? ? ? ?Depression Screening not due until??09/29/20??and every 1??year(s)  ??? ? ? ?Diabetes Maintenance-Foot Exam not due until??10/14/20??and every 1??year(s)  ??? ? ? ?Diabetes Maintenance-Urine Dipstick not due until??10/16/20??and every 1??year(s)  ??? ? ? ?Hypertension Management-Education not due until??10/29/20??and every 1??year(s)  ??? ? ? ?Alcohol Misuse Screening not due until??01/01/21??and every 1??year(s)  ??? ? ? ?Obesity Screening not due until??01/01/21??and every 1??year(s)  ??? ? ? ?Diabetes Maintenance-HgbA1c not due until??01/06/21??and every 1??year(s)  ??? ? ? ?Diabetes Maintenance-Fasting Lipid Profile not due until??01/29/21??and every 1??year(s)  ??? ? ? ?Hypertension Management-BMP not due until??01/29/21??and every 1??year(s)  ??? ? ? ?Diabetes Maintenance-Serum Creatinine not due until??01/30/21??and every 1??year(s)  ??? ? ? ?Blood Pressure Screening not due until??02/03/21??and every 1??year(s)  ??? ? ? ?Body Mass Index Check not due until??02/03/21??and every 1??year(s)  ??? ? ? ?Hypertension Management-Blood Pressure not due until??02/03/21??and every 1??year(s)  ???Satisfied?(in the past 1 year)  ??? ??Satisfied?  ??? ? ? ?ADL Screening on??05/05/19.??Satisfied by Farzana Andrade RN  ??? ? ? ?Alcohol Misuse Screening on??02/04/20.??Satisfied by Sammi Headley  ??? ? ? ?Blood Pressure Screening on??02/04/20.??Satisfied by Sammi Headley  ??? ? ? ?Body Mass Index Check on??01/08/20.??Satisfied by Teresa Morelos LPN  ??? ? ? ?Breast Cancer Screening on??10/18/19.??Satisfied by Leela Park.  ??? ? ? ?Cervical Cancer Screening on??09/30/19.??Satisfied by Shanta Hernandez  ??? ? ? ?Coronary Artery Disease Maintenance-Lipid Lowering Therapy on??02/04/20.??Satisfied by Katerine Valdivia MD  ??? ? ? ?Depression Screening on??09/30/19.??Satisfied by Javier Cerda  ??? ? ? ?Diabetes Maintenance-Eye Exam on??02/04/20.??Satisfied by  Katerine Valdivia MD  ??? ? ? ?Diabetes Screening on??01/30/20.??Satisfied by Rhiannon Amaya  ??? ? ? ?Hypertension Management-Blood Pressure on??02/04/20.??Satisfied by Sammi Headley  ??? ? ? ?Influenza Vaccine on??10/15/19.??Satisfied by Susy Crawford  ??? ? ? ?Lipid Screening on??01/30/20.??Satisfied by Rhiannon Amaya  ??? ? ? ?Obesity Screening on??01/08/20.??Satisfied by Teresa Morelos LPN  ??? ? ? ?Tetanus Vaccine on??10/15/19.??Satisfied by Susy Crawford  ??? ??Refused?  ??? ? ? ?HF-LVEF on??10/29/19.??Recorded by Katerine Valdivia MD  ?  Diagnostic Results  (01/07/2020 14:08 CST XR Bone Density Complete)  FINDINGS.  ?  BMD ? ? T-score ?  0.641 ? ? ? ?-1.0 ? ? ? ?Radius Distal Third Left?  0.594 ? ? ? ?-1.7 ? ? ? ?Radius Distal Third Right  ?  IMPRESSION.  Osteopenia. Moderately increased fracture risk.  ? [1]     [1]?XR Bone Density Complete; Juvenal Gay MD 01/07/2020 14:08 CST

## 2022-05-14 NOTE — OP NOTE
Patient:   Heather Arteaga            MRN: [id number]            FIN: [id number]               Age:   56 years     Sex:  Female     :  1965   Associated Diagnoses:   None   Author:   Refugio Lou MD      DATE OF SURGERY:    22    SURGEON:  Refugio Lou MD    PREOPERATIVE DIAGNOSIS:  Lumbar spondylosis.    POSTOPERATIVE DIAGNOSIS:  Lumbar spondylosis.    PROCEDURE:  Fluoroscopically guided radiofrequency ablation at right L3, L4, L5, S1.    PROCEDURE IN DETAIL:  Following informed consent, and with the patient under general anesthesia, the patient was prepped and draped in usual sterile fashion.  The skin and subcutaneous tissue were anesthetized.  Following this I used an 18 gauge angulated 150 mm exposed tip needle at each level, correctly placing them under fluoroscopic guidance at the junction of the SAP and transverse process.  After stimulating the medial branch nerve at each level without evidence of motor signs, I performed radiofrequency ablation of each level, heating the affected nerves to 80 degrees centigrade for 90 seconds.  A total of four sites were heated and treated.  I removed the needles and applied sterile dressings.  The patient tolerated the procedure well without apparent complications.    IMPRESSION:  Successful fluoroscopically guided radiofrequency ablation at right L3, L4, L5, S1.      _____________________________  Refugio Lou MD

## 2022-05-14 NOTE — OP NOTE
DATE OF SURGERY:        SURGEON:  Emmanuel Dotson M.D.    PREOPERATIVE DIAGNOSIS:  Need for age-appropriate screening colonoscopy.    PROCEDURE:  Colonoscopy to the cecum with intubation of ileocecal valve.    POSTOPERATIVE DIAGNOSES:    1. Normal terminal ileum up to 20 cm.   2. Normal colonoscopy.  3. Grade 2 internal hemorrhoids with good tone, no masses.    INDICATION:  Patient is a 56-year-old  female with a history of hypercholesterolemia, type 2 diabetes, hypothyroidism, with hypertension and reflux disease.  She is status post DVT and PE in the past and had a colonoscopy in 2018 that showed polyps in the mid ascending colon that were consistent with a tubular adenoma.  She also had a polyp at 55 cm that was consistent with tubular adenoma along with another hyperplastic polyp at 40 cm.  She had grade 1 hemorrhoids and diverticulosis associated with it.  The patient, since that time, needed a followup colonoscopy since she is now 4 years out from her original scope and had polyps.  The patient was brought to the GI suite, underwent sedation and endoscopy of the colon all the way to the cecum with intubation of ileocecal valve.     Terminal ileum was normal up to 10-20 cm.  Cecum, ascending colon, transverse colon, and descending colon were normal. Rectosigmoid had some minimal diverticular disease, grade 2 internal hemorrhoids were seen, no other pathology was visualized.  Overall, the patient did very well and had no problems or difficulties.    PLAN:    1. Follow up in the office to discuss results.   2.   Follow up in 2 years to recheck stools for blood.   3. Follow up in 3-5 years, repeat colonoscopy for a history of polyp formation in 2018.        FAVIOLA/MOIZ   DD: 04/11/2022 1352   DT: 04/11/2022 1538  Job # [id number]

## 2022-05-20 NOTE — HISTORICAL [facility] CERNER
This is a historical note converted from Yadira. Formatting and pictures may have been removed.  Please reference Yadira for original formatting and attached multimedia. Chief Complaint  F/U HTN, DM  History of Present Illness  Pt is a?55 years?old female with a ?past medical history of?DM2, HTN, HLD,?pulmonary emboli, hypothyroidism, osteopenia, ACDF (2020)?for chronic neck pain, L4/L5 s/p lumbar surgery in  and?obesity status post gastric sleeve and remote right pontine lacunar infarct.  Patient states that?she?is?adhering?to a?low fat, low sodium, and low carbohydrate eating habit.??Patient states?that?she?is?compliant?with?his?medication.  ?  Lumbar back pain with radiculopathy:  -Pt saw Matthieu Parada who stated last month and he wants to do surgical procedure to realign the lumbar spine.  -Scheduled appointment?with?Dr. Lou, neurology-interventional, 21 10 am  -Pt having? back pain with radicular pain into rt thigh.  ?   Remote right pontine lacunar infarct:  -Patient scheduled to see neurology, Dr. Bauer, on 2021  -Patient currently asymptomatic  ?   Bereavement:  -Pts brother  a week or so ago car accident? ([place]) and mother  in 2021 of COVID. ?Patient currently?in bereavement. PHQ 2 was 0.? Patient declines counseling or?SSRI/SNRI.  ?  Patient denies chest pain, shortness of breath,?dyspnea on exertion, palpitations, peripheral edema,?abdominal pain, nausea, vomiting,?diarrhea,?constipation, fatigue, fever, chills,?dysuria,? hematuria, melena,?or hematochezia.  ?  ?   2021 visit  Pt is a?55 years?old female with a ?past medical history of?DM2, HTN, HLD,?pulmonary emboli, hypothyroidism, osteopenia, ACDF (2020)?for chronic neck pain, L4/L5 s/p lumbar surgery in  and?obesity status post gastric sleeve.  Discussed with the patient labs from?2021: CBC with diff:?Within normal limits; CMP:?Within normal limits?; HgA1c (2021 ):?8.5; Urine Microalbumin (  6/25/2021): 15.8 FLP (?6/25/2021): :?Within normal limits  Patient states that?she?is?adhering?to a?low fat, low sodium, and low carbohydrate eating habit.??Patient states?that?she?is?compliant?with?her?medication.  ?DM2:??Pt not checking CBS regularlyl.? Currently on ?Jardiance and could not tolerate Trulicity 3 mg subcutaneously wkly caused nausea. Pt is getting better with compliance with ADA diet and exercise. Pt no longer eating sweets? and junk food. Pt has diabetic neuropathy in hands and feet on gabapentin.??Morbid conditions include?hypertension, hyperlipidemia, and hypothyroidism.  HTN: Controlled on chlorthalidone and lisinopril. Pt is compliant with low sodium diet and exercise. BP? low normal  Hypothyroidism: Thyroid function tests (6/25/2021):WNL? Currently on Levothyroxine 50 mcg po daily. Patient denies fatigue, palpitations,?constipation, or mood lability.  HLD: FLP (6/25/2021)Within normal limits. Pt adheres to low fat eating habit.? Pt adheres to low fat eating habit. Pt currently on Repatha and Zetia.  Lumbar back pain: Discussed extensively lumbar back pain and possible therapies to help alleviate the pain. Pt states she continues to have pain in her lumbar spine causing her ?hunch over as she walks because the pain is so severe.? Pain management?- Dr. Homer Garner s NP [facility] has given?her?steroid injections x2 (?6/15/2021 and 7/20/2021) without any relief. She states she continues to have pain from the back into the anterior right thigh. 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 wk. Pt states she is is interested in? acupuncture or dry needling (if it will help).Discussed at length going back to Dr. Matthieu Parada , Neurosurgeon who did her lumbar surgery,?discussing the?radicular pain issues she is having s/p  lumbar surgery, and??discussing the placement of a ?spinal cord stimulator via Interventional Neurology Dr. Refugio Lou to aid in decreasing the neuropathic pain. Pt was given a website to read about? spinal cord stimulators. Pt expressed verbal understanding and agreement with the above.  Review of Systems  GENERAL:?Negative for abnormal weight loss or weight gain, fatigue, fever, or chills. Negative except for stated in HPI.  HEENT:??Negative for head trauma,?blurred vision, rhinorrhea, ?nasal congestion, sore throat or hearing deficit. Negative except for stated in HPI.  CARDIOVASCULAR: Negative for? chest pain, palpitations, DUFFY, PND, orthopnea, peripheral edema or syncope. Negative except for stated in HPI.  RESPIRATORY: Negative for SOB, DUFFY, cough or?wheezing.? Negative except for stated in HPI.  GASTROINTESTINAL: Negative for abdominal pain, nausea,??vomiting, diarrhea, constipation, heartburn, ?hematochezia? or melena.? Negative except for stated in HPI.  GENITOURINARY: Negative for dysuria, hematuria, urinary frequency, urinary urgency, urinary hesitancy or flank pain. Negative except for stated in HPI.  ENDOCRINE: Negative for fatigue, heat intolerance, cold intolerance, polyuria, polydipsia, or polyphagia.? Negative except for stated in HPI.  PSYCHOLOGICAL: Negative for depression, anxiety, suicidal or homicidal ideations, hallucinations??or?wanda.? Negative except for?stated in HPI.  MUSCULOSKELETAL: Negative for myalgia, joint pain, or joint swelling.? Negative except for?stated in HPI.  INTEGUMENT: Negative for rash?or lesions. Negative except for stated in HPI.  NEUROLOGY: Negative for headaches, dizziness, numbness, tingling,?vertigo?or gait disturbances. Negative except for stated in HPI.  ?  ?  Physical Exam  Vitals & Measurements  T:?36.5? ?C (Oral)? HR:?60(Peripheral)? BP:?158/78? SpO2:?100%?  HT:?165.00?cm? WT:?103.100?kg? BMI:?37.87?  GENERAL:?Alert and oriented to person, place, time and  situation,?NAD  HEENT: NCAT, Pupils equally round and reactive to light accommodation,?normal sclera, ?moist mucous membranes,??TM clear bilaterally, neck?supple,?thyroid normal,?no lymphadenopathy.  CARDIOVASCULAR:?Regular rateand?rhythm,? S1 and S2 normal;?no murmurs, no rubs, or no gallops; no carotid bruit.  RESPIRATORY:??Lungs clear to auscultation bilaterally;?no wheezes,?no rhonchi,?or? no crackles  ABDOMEN: Soft/ Nontender/ Nondistended; Bowel Sounds x4 - normoactive; no abdominal pulsatile mass, no masses, no hernias  EXTREMITY:? No clubbing, no cyanosis and?no peripheral edema; Dorsalis pedis and tibial ?pulses 2+  MUSCULOSKELETAL: FROM of Upper and Lower extremities; Strength 5/5 of Upper and Lower extremities; TTP L1 -L5/S1  PSYCHOLOGICAL: Good judgement and insight; normal affect and mood.  NEUROLOGICAL: Normal gait and ?normal sensation;?no focal deficits; Cranial Nerves 2 -12 grossly intact  ?  Assessment/Plan  1.?DM2 (diabetes mellitus, type 2)?E11.9  Chronic issue/uncontrolled  Last hemoglobin A1c (6/25/2021 ):?8.5  Urine microalbumin Urine Microalbumin ( 6/25/2021): 15.  DM2 foot exam: 3/24/2021  DM2 ophthalmology?fundus exam:?9/28/2020  Medication?regimen:  Discontinue Trulicity?3 mg sc q wk -Unable to tolerate because of nausea  continue?Trulicity 1.5 mg? and 0.75mg for total of 2.25 mg q wk  DM2 guidance:  ACE inhibitor?or ARB?for renal protection:?Yes  ASA 81 mg p.o. daily?for CVD protection:?Yes  Statin?for CVD protection:?No  Continue low carbohydrate ADA eating habit  Continue to monitor CBS daily with routine:M, W, F- fasting; Tues and Thursday- bedtime; Sat and Sun- 2 hrs after lunch  Patient educated about signs and symptoms of hypoglycemia;?patient educated about?prevention?and management of hypoglycemia.  Patient expressed verbal understanding and agreement with the above medical management?and treatment  Ordered:  CBC w/ Auto Diff, Routine collect, *Est. 11/23/21 3:00:00 CST, Blood,  Order for future visit, *Est. Stop date 11/23/21 3:00:00 CST, Lab Collect, DM2 (diabetes mellitus, type 2)  HTN (hypertension)  Lumbar back pain with radiculopathy affecting lower extremity, 11/23/...  Comprehensive Metabolic Panel, Routine collect, *Est. 11/23/21 3:00:00 CST, Blood, Order for future visit, *Est. Stop date 11/23/21 3:00:00 CST, Lab Collect, DM2 (diabetes mellitus, type 2)  HTN (hypertension)  Lumbar back pain with radiculopathy affecting lower extremity, 11/23/...  Hemoglobin A1C [facility], Routine collect, *Est. 11/23/21 3:00:00 CST, Blood, Order for future visit, *Est. Stop date 11/23/21 3:00:00 CST, Lab Collect, DM2 (diabetes mellitus, type 2), 11/23/21 14:29:00 CST  Microalbum/Creatinine Ratio Urine (Microalb/Creat), Routine collect, Urine, Order for future visit, *Est. 11/23/21 3:00:00 CST, *Est. Stop date 11/23/21 3:00:00 CST, Nurse collect, DM2 (diabetes mellitus, type 2)  ?  2.?HTN (hypertension)?I10  ?Chronic issue/ controlled  Chronic issue  Continue to?monitor BP daily and bring in log at next visit  Continue low sodium eating habit of ?less than 2 grams/ day/Dash eating habit  Encourage exercise for 30 minutes 5 days/ week (total 150 minutes/week)  Continue current medications:  -chlorthalidone 25 mg to 0.5 mg tab daily.  -lisinopril 2.5 mg po daily..  Ordered:  CBC w/ Auto Diff, Routine collect, *Est. 11/23/21 3:00:00 CST, Blood, Order for future visit, *Est. Stop date 11/23/21 3:00:00 CST, Lab Collect, DM2 (diabetes mellitus, type 2)  HTN (hypertension)  Lumbar back pain with radiculopathy affecting lower extremity, 11/23/...  Comprehensive Metabolic Panel, Routine collect, *Est. 11/23/21 3:00:00 CST, Blood, Order for future visit, *Est. Stop date 11/23/21 3:00:00 CST, Lab Collect, DM2 (diabetes mellitus, type 2)  HTN (hypertension)  Lumbar back pain with radiculopathy affecting lower extremity, 11/23/...  Lipid Panel, Routine collect, *Est. 11/23/21 3:00:00 CST, Blood, Order for  future visit, *Est. Stop date 11/23/21 3:00:00 CST, Lab Collect, HTN (hypertension), 11/23/21 14:29:00 CST  ?  3.?Hypothyroidism?E03.9  Chronic issue/stable.? TFT (26/25/2021) WNL. Continue levothyroxine.?  Ordered:  Free T4, Routine collect, *Est. 11/23/21 3:00:00 CST, Blood, Order for future visit, *Est. Stop date 11/23/21 3:00:00 CST, Lab Collect, Hypothyroidism, 11/23/21 14:30:00 CST  T3 Free, Routine collect, *Est. 11/23/21 3:00:00 CST, Blood, Order for future visit, *Est. Stop date 11/23/21 3:00:00 CST, Lab Collect, Hypothyroidism, 11/23/21 14:30:00 CST  Thyroid Stimulating Hormone, Routine collect, *Est. 11/23/21 3:00:00 CST, Blood, Order for future visit, *Est. Stop date 11/23/21 3:00:00 CST, Lab Collect, Hypothyroidism, 11/23/21 14:29:00 CST  ?  4.?Lumbar back pain with radiculopathy affecting lower extremity?M54.16  Chronic issue/ uncontrolled  S/P IESI lumbar spine without resolve of back pain  S/p 12 weeks of PT with no resolve of back pain with radiculopathy  Discussed at length patient going back to Dr. Matthieu Parada , Neurosurgeon who did her lumbar surgery,?discussing the?radicular pain issues she is having s/p lumbar surgery, and??discussing the placement of a spinal cord stimulator via Interventional Neurology Dr. Refugio Lou to aid in decreasing the neuropathic pain.  Pt was given a website to read about? spinal cord stimulators.  Pt expressed verbal understanding and agreement with the above.  Ordered:  CBC w/ Auto Diff, Routine collect, *Est. 11/23/21 3:00:00 CST, Blood, Order for future visit, *Est. Stop date 11/23/21 3:00:00 CST, Lab Collect, DM2 (diabetes mellitus, type 2)  HTN (hypertension)  Lumbar back pain with radiculopathy affecting lower extremity, 11/23/...  Comprehensive Metabolic Panel, Routine collect, *Est. 11/23/21 3:00:00 CST, Blood, Order for future visit, *Est. Stop date 11/23/21 3:00:00 CST, Lab Collect, DM2 (diabetes mellitus, type 2)  HTN (hypertension)  Lumbar  back pain with radiculopathy affecting lower extremity, 11/23/...  ?  5.?Immunization due?Z23  ?pneumovax given  Ordered:  pneumococcal 23-polyvalent vaccine, 0.5 mL, form: Injection, IM, Once, first dose 11/23/21 13:10:00 CST, stop date 11/23/21 13:10:00 CST, Waste Code BKC  ?  6.?Vitamin D deficiency?E55.9  Chronic issue.??Patient complaining of myalgias.??Vit D level?ordered  Ordered:  Vitamin D, 25-Hydroxy Level, Routine collect, *Est. 11/23/21 3:00:00 CST, Blood, Order for future visit, *Est. Stop date 11/23/21 3:00:00 CST, Lab Collect, Vitamin D deficiency, 11/23/21 14:29:00 CST  ?  RTC in 6 wks for the above chronic issues.   Problem List/Past Medical History  Ongoing  De Quervains tenosynovitis  Diabetic neuropathy  DM2 (diabetes mellitus, type 2)  Dysuria  Fibroid uterus  HTN (hypertension)  Hyperlipidemia  Hypothyroidism  Lumbar back pain with radiculopathy affecting lower extremity  Morbid obesity  Nausea  Other seasonal allergic rhinitis  Tendonitis of other site  Vitamin D deficiency  Historical  Acute pulmonary embolism  DVT - Deep vein thrombosis  High blood pressure  Procedure/Surgical History  Biopsy Gastrointestinal (09/05/2019)  Esophagogastroduodenoscopy (09/05/2019)  Esophagogastroduodenoscopy, flexible, transoral; with biopsy, single or multiple (09/05/2019)  Excision of Stomach, Pylorus, Via Natural or Artificial Opening Endoscopic, Diagnostic (09/05/2019)  Reposition Left Upper Femur with Intramedullary Internal Fixation Device, Percutaneous Approach (05/03/2019)  Repair Face Skin, External Approach (05/02/2019)  Colon Tattoo (10/15/2018)  Colonoscopy (10/15/2018)  Colonoscopy, flexible; with directed submucosal injection(s), any substance (10/15/2018)  Colonoscopy, flexible; with removal of tumor(s), polyp(s), or other lesion(s) by snare technique (10/15/2018)  Excision of Ascending Colon, Via Natural or Artificial Opening Endoscopic, Diagnostic (10/15/2018)  Excision of Descending Colon,  Via Natural or Artificial Opening Endoscopic, Diagnostic (10/15/2018)  Excision of Sigmoid Colon, Via Natural or Artificial Opening Endoscopic, Diagnostic (10/15/2018)  Introduction of Other Therapeutic Substance into Lower GI, Via Natural or Artificial Opening Endoscopic (10/15/2018)  Polypectomy (10/15/2018)  Drainage of Abdomen Subcutaneous Tissue and Fascia, Open Approach (08/27/2018)  Evacuation Hematoma (Left, Torso) (08/27/2018)  Incision and drainage of hematoma, seroma or fluid collection (08/27/2018)  Catheter placement in coronary artery(s) for coronary angiography, including intraprocedural injection(s) for coronary angiography, imaging supervision and interpretation; with right and left heart catheterization including intraprocedural injection(s) fo (11/14/2016)  Fluoroscopy of Left Heart using Low Osmolar Contrast (11/14/2016)  Fluoroscopy of Multiple Coronary Arteries using Low Osmolar Contrast (11/14/2016)  Measurement of Cardiac Sampling and Pressure, Bilateral, Percutaneous Approach (11/14/2016)  Transfusion of Nonautologous Red Blood Cells into Peripheral Vein, Percutaneous Approach (10/19/2016)  Dilation of Left Common Iliac Vein with Intraluminal Device, Percutaneous Approach (08/19/2016)  Fluoroscopy of Bilateral Lower Extremity Veins using Low Osmolar Contrast (08/19/2016)  Injection procedure for extremity venography (including introduction of needle or intracatheter) (08/19/2016)  Intravascular ultrasound (noncoronary vessel) during diagnostic evaluation and/or therapeutic intervention, including radiological supervision and interpretation; initial noncoronary vessel (List separately in addition to code for primary procedure) (08/19/2016)  Transcatheter placement of an intravascular stent(s), open or percutaneous, including radiological supervision and interpretation and including angioplasty within the same vessel, when performed; initial vein (08/19/2016)  Ultrasonography of Left Lower  Extremity Veins, Intravascular (08/19/2016)  Occlusion of Right Uterine Artery with Intraluminal Device, Percutaneous Approach (08/04/2016)  HOSPITAL OBSERVATION SERVICE, PER HOUR (01/19/2016)  Back  collarbone surgery  gastro sleeve  left femur fx  Left Knee meniscus repair  neck surgery  right hip re fx  right hip replacement   Medications  acetaminophen-hydrocodone 325 mg-10 mg oral tablet, 1 tab(s), Oral, q6hr  aspirin 81 mg oral tablet, 81 mg= 1 tab(s), Oral, qAM  betamethasone dipropionate, augmented 0.05% topical lotion, 1 huy, TOP, BID  calcium-vitamin D 600 mg-400 intl units oral tablet, 1 tab(s), Oral, BID, 5 refills  chlorthalidone 25 mg oral tablet, See Instructions,? ?Not taking  chlorthalidone 25 mg oral tablet, See Instructions  diabetic shoes with 3 pairs of insoles, See Instructions  diabetic supplies- alcohol pads, See Instructions  Diabetic supplies- lancets, See Instructions, 11 refills  diabetic supplies- test strips, See Instructions, 11 refills  dulaglutide 1.5 mg/0.5 mL subcutaneous solution, 1.5 mg, Subcutaneous, qWeek, 5 refills,? ?Not taking  ezetimibe 10 mg oral tablet, See Instructions, 11 refills  gabapentin 300 mg oral capsule, 300 mg= 1 cap(s), Oral, qPM, 5 refills  glucometer, See Instructions  Jardiance 25 mg oral tablet, 25 mg= 1 tab(s), Oral, Daily, 11 refills  levothyroxine 50 mcg (0.05 mg) oral tablet, See Instructions, 11 refills  lisinopril 2.5 mg oral tablet, See Instructions, 11 refills  naproxen 500 mg oral tablet, 500 mg= 1 tab(s), Oral, BID  ondansetron 8 mg oral tablet, 8 mg= 1 tab(s), Oral, q8hr  pneumococcal 23-polyvalent vaccine injectable solution (adult), 0.5 mL, IM, Once  Repatha Pushtronex 420 mg/3.5 mL subcutaneous solution, 420 mg= 3.5 mL, Subcutaneous, qMonth, 11 refills  tiZANidine 4 mg oral tablet, 4 mg= 1 tab(s), Oral, q8hr  TRUEplus Lancets 28 gauge, See Instructions,? ?Not taking  TRUEplus Lancets 30 gauge, See Instructions  Smileyity Pen 0.75 mg/0.5 mL  subcutaneous solution, 0.75 mg, Subcutaneous, qWeek, 5 refills,? ?Not taking  Trulicity Pen 3 mg/0.5 mL subcutaneous solution, 3 mg, Subcutaneous, qWeek  Allergies  Butrans?(burn)  Social History  Abuse/Neglect  No, No, Yes, 11/23/2021  Alcohol - Denies Alcohol Use, 01/19/2016  Current, 1-2 times per year, 05/03/2019  Employment/School - No Risk, 01/19/2016  Disabled, 10/08/2018  Exercise - Does not exercise, 01/19/2016  Exercise duration: 30. Exercise frequency: 1-2 times/week. Exercise type: Walking., 10/15/2019  Financial/Legal Situation  None, 12/01/2020  Home/Environment - No Risk, 01/19/2016  Lives with Alone., 10/08/2018    Never in , 12/01/2020  Nutrition/Health - Medium Risk, 01/19/2016  Diabetic, Good, 10/15/2019  Sexual  Sexually active: Yes., 10/07/2020  Spiritual/Cultural  Orthodox, No, 10/29/2019  Substance Use - Denies Substance Abuse, 01/19/2016  Never, 03/15/2018  Tobacco - Denies Tobacco Use, 06/09/2014  Former smoker, quit more than 30 days ago, N/A, 11/23/2021  Family History  CAD (coronary artery disease): Mother and Sister.  Cervical cancer.......: Mother.  Immunizations  Vaccine Date Status Comments   influenza virus vaccine, inactivated 12/09/2021 Given    pneumococcal 23-polyvalent vaccine 11/23/2021 Given Early/Late Reason:  Other : ?Clinic running late   COVID-19 mRNA, LNP-S, PF - Moderna 10/26/2021 Recorded    zoster vaccine, inactivated 03/24/2021 Given    COVID-19 mRNA, LNP-S, PF - Moderna 02/23/2021 Recorded    COVID-19 mRNA, LNP-S, PF - Moderna 01/26/2021 Recorded    zoster vaccine, inactivated 12/01/2020 Given    influenza virus vaccine, inactivated 10/20/2020 Given    influenza virus vaccine, inactivated 10/15/2019 Given    tetanus/diphtheria/pertussis, acel(Tdap) 10/15/2019 Given    pneumococcal 23-polyvalent vaccine 01/20/2016 Given New Med Order   influenza virus vaccine, inactivated 09/20/2012 Recorded    poliovirus vaccine, live, trivalent 04/25/1972 Recorded     poliovirus vaccine, live, trivalent 05/07/1968 Recorded    measles virus vaccine 12/07/1967 Recorded    poliovirus vaccine, live, trivalent 03/15/1967 Recorded    poliovirus vaccine, live, trivalent 02/15/1967 Recorded    poliovirus vaccine, live, trivalent 01/18/1967 Recorded    Health Maintenance  Health Maintenance  ???Pending?(in the next year)  ??? ??OverDue  ??? ? ? ?Aspirin Therapy for CVD Prevention due??08/27/19??and every 1??year(s)  ??? ? ? ?Diabetes Maintenance-Eye Exam due??09/28/21??and every 1??year(s)  ??? ??Due In Future?  ??? ? ? ?Hypertension Management-Education not due until??12/01/21??and every 1??year(s)  ??? ? ? ?Obesity Screening not due until??01/01/22??and every 1??year(s)  ??? ? ? ?Alcohol Misuse Screening not due until??01/02/22??and every 1??year(s)  ??? ? ? ?Medicare Annual Wellness Exam not due until??03/19/22??and every 1??year(s)  ??? ? ? ?Diabetes Maintenance-Foot Exam not due until??03/24/22??and every 1??year(s)  ??? ? ? ?ADL Screening not due until??03/24/22??and every 1??year(s)  ??? ? ? ?Diabetes Maintenance-HgbA1c not due until??06/25/22??and every 1??year(s)  ??? ? ? ?Hypertension Management-BMP not due until??06/25/22??and every 1??year(s)  ??? ? ? ?Diabetes Maintenance-Serum Creatinine not due until??06/25/22??and every 1??year(s)  ??? ? ? ?Diabetes Maintenance-Fasting Lipid Profile not due until??06/25/22??and every 1??year(s)  ??? ? ? ?Cervical Cancer Screening not due until??09/29/22??and every 3??year(s)  ??? ? ? ?Breast Cancer Screening not due until??10/28/22??and every 2??year(s)  ???Satisfied?(in the past 1 year)  ??? ??Satisfied?  ??? ? ? ?ADL Screening on??03/24/21.??Satisfied by Alisa Cornejo LPN.  ??? ? ? ?Alcohol Misuse Screening on??02/01/21.??Satisfied by Brie Cintron LPN  ??? ? ? ?Blood Pressure Screening on??11/23/21.??Satisfied by Segundo Clark LPN  ??? ? ? ?Body Mass Index Check on??11/23/21.??Satisfied by Segundo Clark LPN  ??? ? ?  ?Colorectal Screening on??10/25/21.??Satisfied by Matthieu Conn  ??? ? ? ?Coronary Artery Disease Maintenance-Lipid Lowering Therapy on??03/24/21.??Satisfied by Katerine Valdivia MD  ??? ? ? ?Depression Screening on??11/23/21.??Satisfied by Segundo Clark LPN  ??? ? ? ?Diabetes Maintenance-Serum Creatinine on??06/25/21.??Satisfied by Romana Baig  ??? ? ? ?Diabetes Screening on??06/25/21.??Satisfied by Romana Baig  ??? ? ? ?Hypertension Management-Blood Pressure on??11/23/21.??Satisfied by Segundo Clark LPN  ??? ? ? ?Influenza Vaccine on??11/23/21.??Satisfied by Segundo Clark LPN  ??? ? ? ?Lipid Screening on??06/25/21.??Satisfied by Romana Baig  ??? ? ? ?Medicare Annual Wellness Exam on??03/19/21.??Satisfied by Katerine Valdivia MD  ??? ? ? ?Obesity Screening on??11/23/21.??Satisfied by Segundo Clark LPN  ?

## 2022-06-15 ENCOUNTER — LAB VISIT (OUTPATIENT)
Dept: LAB | Facility: HOSPITAL | Age: 57
End: 2022-06-15
Attending: FAMILY MEDICINE
Payer: MEDICARE

## 2022-06-15 DIAGNOSIS — E78.2 MIXED HYPERLIPIDEMIA: ICD-10-CM

## 2022-06-15 DIAGNOSIS — E03.9 PRIMARY HYPOTHYROIDISM: ICD-10-CM

## 2022-06-15 DIAGNOSIS — I10 ESSENTIAL HYPERTENSION, MALIGNANT: ICD-10-CM

## 2022-06-15 DIAGNOSIS — E55.9 VITAMIN D DEFICIENCY: ICD-10-CM

## 2022-06-15 LAB
ALBUMIN SERPL-MCNC: 3.7 GM/DL (ref 3.5–5)
ALBUMIN/GLOB SERPL: 1.1 RATIO (ref 1.1–2)
ALP SERPL-CCNC: 96 UNIT/L (ref 40–150)
ALT SERPL-CCNC: 15 UNIT/L (ref 0–55)
AST SERPL-CCNC: 14 UNIT/L (ref 5–34)
BASOPHILS # BLD AUTO: 0.03 X10(3)/MCL (ref 0–0.2)
BASOPHILS NFR BLD AUTO: 0.3 %
BILIRUBIN DIRECT+TOT PNL SERPL-MCNC: 0.4 MG/DL
BUN SERPL-MCNC: 15 MG/DL (ref 9.8–20.1)
CALCIUM SERPL-MCNC: 9.6 MG/DL (ref 8.4–10.2)
CHLORIDE SERPL-SCNC: 104 MMOL/L (ref 98–107)
CHOLEST SERPL-MCNC: 198 MG/DL
CHOLEST/HDLC SERPL: 2 {RATIO} (ref 0–5)
CO2 SERPL-SCNC: 28 MMOL/L (ref 22–29)
CREAT SERPL-MCNC: 0.65 MG/DL (ref 0.55–1.02)
CREAT UR-MCNC: 240.4 MG/DL (ref 47–110)
DEPRECATED CALCIDIOL+CALCIFEROL SERPL-MC: 26.1 NG/ML (ref 30–80)
EOSINOPHIL # BLD AUTO: 0.05 X10(3)/MCL (ref 0–0.9)
EOSINOPHIL NFR BLD AUTO: 0.5 %
ERYTHROCYTE [DISTWIDTH] IN BLOOD BY AUTOMATED COUNT: 12.9 % (ref 11.5–17)
EST. AVERAGE GLUCOSE BLD GHB EST-MCNC: 211.6 MG/DL
GLOBULIN SER-MCNC: 3.5 GM/DL (ref 2.4–3.5)
GLUCOSE SERPL-MCNC: 175 MG/DL (ref 74–100)
HBA1C MFR BLD: 9 %
HCT VFR BLD AUTO: 34.5 % (ref 37–47)
HDLC SERPL-MCNC: 82 MG/DL (ref 35–60)
HGB BLD-MCNC: 10.5 GM/DL (ref 12–16)
IMM GRANULOCYTES # BLD AUTO: 0.01 X10(3)/MCL (ref 0–0.02)
IMM GRANULOCYTES NFR BLD AUTO: 0.1 % (ref 0–0.43)
LDLC SERPL CALC-MCNC: 102 MG/DL (ref 50–140)
LYMPHOCYTES # BLD AUTO: 4.01 X10(3)/MCL (ref 0.6–4.6)
LYMPHOCYTES NFR BLD AUTO: 39.8 %
MCH RBC QN AUTO: 26.9 PG (ref 27–31)
MCHC RBC AUTO-ENTMCNC: 30.4 MG/DL (ref 33–36)
MCV RBC AUTO: 88.5 FL (ref 80–94)
MICROALBUMIN UR-MCNC: 27.7 UG/ML
MICROALBUMIN/CREAT RATIO PNL UR: 11.5 MG/GM CR (ref 0–30)
MONOCYTES # BLD AUTO: 0.48 X10(3)/MCL (ref 0.1–1.3)
MONOCYTES NFR BLD AUTO: 4.8 %
NEUTROPHILS # BLD AUTO: 5.5 X10(3)/MCL (ref 2.1–9.2)
NEUTROPHILS NFR BLD AUTO: 54.5 %
PLATELET # BLD AUTO: 275 X10(3)/MCL (ref 130–400)
PMV BLD AUTO: 9.9 FL (ref 9.4–12.4)
POTASSIUM SERPL-SCNC: 3.5 MMOL/L (ref 3.5–5.1)
PROT SERPL-MCNC: 7.2 GM/DL (ref 6.4–8.3)
RBC # BLD AUTO: 3.9 X10(6)/MCL (ref 4.2–5.4)
SODIUM SERPL-SCNC: 143 MMOL/L (ref 136–145)
T3FREE SERPL-MCNC: 1.7 PG/ML (ref 1.57–3.91)
T4 FREE SERPL-MCNC: 0.73 NG/DL (ref 0.7–1.48)
TRIGL SERPL-MCNC: 69 MG/DL (ref 37–140)
TSH SERPL-ACNC: 2.01 UIU/ML (ref 0.35–4.94)
VLDLC SERPL CALC-MCNC: 14 MG/DL
WBC # SPEC AUTO: 10.1 X10(3)/MCL (ref 4.5–11.5)

## 2022-06-15 PROCEDURE — 85025 COMPLETE CBC W/AUTO DIFF WBC: CPT

## 2022-06-15 PROCEDURE — 84439 ASSAY OF FREE THYROXINE: CPT

## 2022-06-15 PROCEDURE — 82306 VITAMIN D 25 HYDROXY: CPT

## 2022-06-15 PROCEDURE — 80061 LIPID PANEL: CPT

## 2022-06-15 PROCEDURE — 84481 FREE ASSAY (FT-3): CPT

## 2022-06-15 PROCEDURE — 83036 HEMOGLOBIN GLYCOSYLATED A1C: CPT

## 2022-06-15 PROCEDURE — 84443 ASSAY THYROID STIM HORMONE: CPT

## 2022-06-15 PROCEDURE — 80053 COMPREHEN METABOLIC PANEL: CPT

## 2022-06-15 PROCEDURE — 36415 COLL VENOUS BLD VENIPUNCTURE: CPT

## 2022-06-15 PROCEDURE — 82043 UR ALBUMIN QUANTITATIVE: CPT

## 2022-06-17 ENCOUNTER — OFFICE VISIT (OUTPATIENT)
Dept: FAMILY MEDICINE | Facility: CLINIC | Age: 57
End: 2022-06-17
Payer: MEDICARE

## 2022-06-17 VITALS
HEART RATE: 60 BPM | DIASTOLIC BLOOD PRESSURE: 65 MMHG | TEMPERATURE: 98 F | WEIGHT: 220 LBS | SYSTOLIC BLOOD PRESSURE: 104 MMHG | HEIGHT: 64 IN | RESPIRATION RATE: 18 BRPM | BODY MASS INDEX: 37.56 KG/M2 | OXYGEN SATURATION: 100 %

## 2022-06-17 DIAGNOSIS — E78.2 MIXED HYPERLIPIDEMIA: ICD-10-CM

## 2022-06-17 DIAGNOSIS — I10 ESSENTIAL HYPERTENSION: ICD-10-CM

## 2022-06-17 DIAGNOSIS — M48.061 SPINAL STENOSIS OF LUMBAR REGION WITH RADICULOPATHY: ICD-10-CM

## 2022-06-17 DIAGNOSIS — Z91.89: ICD-10-CM

## 2022-06-17 DIAGNOSIS — E11.9 TYPE 2 DIABETES MELLITUS WITHOUT COMPLICATION, WITHOUT LONG-TERM CURRENT USE OF INSULIN: ICD-10-CM

## 2022-06-17 DIAGNOSIS — M54.16 SPINAL STENOSIS OF LUMBAR REGION WITH RADICULOPATHY: ICD-10-CM

## 2022-06-17 PROBLEM — M54.50 LOW BACK PAIN: Status: ACTIVE | Noted: 2022-06-17

## 2022-06-17 PROBLEM — E11.40 DIABETIC NEUROPATHY: Status: ACTIVE | Noted: 2022-06-17

## 2022-06-17 PROBLEM — E03.9 ACQUIRED HYPOTHYROIDISM: Status: ACTIVE | Noted: 2018-01-09

## 2022-06-17 PROBLEM — M47.816 SPONDYLOSIS OF LUMBAR SPINE: Status: ACTIVE | Noted: 2022-06-17

## 2022-06-17 PROBLEM — M65.4 RADIAL STYLOID TENOSYNOVITIS: Status: ACTIVE | Noted: 2022-06-17

## 2022-06-17 PROBLEM — D25.9 UTERINE LEIOMYOMA: Status: ACTIVE | Noted: 2022-06-17

## 2022-06-17 PROCEDURE — 99215 OFFICE O/P EST HI 40 MIN: CPT | Mod: PBBFAC | Performed by: FAMILY MEDICINE

## 2022-06-17 PROCEDURE — 99214 OFFICE O/P EST MOD 30 MIN: CPT | Mod: S$PBB,,, | Performed by: FAMILY MEDICINE

## 2022-06-17 PROCEDURE — 99214 PR OFFICE/OUTPT VISIT, EST, LEVL IV, 30-39 MIN: ICD-10-PCS | Mod: S$PBB,,, | Performed by: FAMILY MEDICINE

## 2022-06-17 RX ORDER — LISINOPRIL 2.5 MG/1
2.5 TABLET ORAL EVERY MORNING
COMMUNITY
Start: 2022-06-14 | End: 2022-06-17

## 2022-06-17 RX ORDER — EMPAGLIFLOZIN 25 MG/1
25 TABLET, FILM COATED ORAL DAILY
COMMUNITY
Start: 2022-03-18

## 2022-06-17 RX ORDER — DULAGLUTIDE 1.5 MG/.5ML
0.5 INJECTION, SOLUTION SUBCUTANEOUS WEEKLY
COMMUNITY
Start: 2022-06-11 | End: 2023-05-08

## 2022-06-17 RX ORDER — HYDROCODONE BITARTRATE AND ACETAMINOPHEN 10; 325 MG/1; MG/1
1 TABLET ORAL EVERY 4 HOURS PRN
COMMUNITY
Start: 2022-06-10

## 2022-06-17 RX ORDER — EZETIMIBE 10 MG/1
10 TABLET ORAL EVERY MORNING
COMMUNITY
Start: 2022-05-23

## 2022-06-17 RX ORDER — GABAPENTIN 600 MG/1
1 TABLET ORAL 3 TIMES DAILY
COMMUNITY
Start: 2022-06-09

## 2022-06-17 NOTE — PROGRESS NOTES
[facility]  [address] [place] [address]  Phone: [phone]   Subjective:      Patient ID: Heather Arteaga is a 56 y.o. female with a past medical history of DM2, HTN, HLD, pulmonary emboli, hypothyroidism, osteopenia, ACDF (2/2020) for chronic neck pain, L4/L5 s/p lumbar surgery in 2017 and obesity status post gastric sleeve.      Chief Complaint: Results and Back Pain    -Patient states that she is adhering to  low sodium, a low fat and low carbohydrate eating habit. Patient states that she is compliant with her medication. Pt states she  Has not been regularly taking her trulicity.      Problem List Items Addressed This Visit        Neuro    Spinal stenosis of lumbar region with radiculopathy       Cardiac/Vascular    Mixed hyperlipidemia    Overview       The patient presents with hyperlipidemia.  The patient reports tolerating the medication well and is in excellent compliance.  There have been no medication side effects.  The patient denies chest pain, neuropathy, and myalgias.  The patient has reduced fat intake and has been exercising.  Current treatment has included the medications listed in the med card.    Lab Results   Component Value Date    CHOL 198 06/15/2022    CHOL 168 03/16/2022    CHOL 214 (H) 11/23/2021       Lab Results   Component Value Date    HDL 82 (H) 06/15/2022    HDL 66 03/16/2022    HDL 76 (H) 11/23/2021     No results found for: LDLCALC    Lab Results   Component Value Date    TRIG 69 06/15/2022    TRIG 60 03/16/2022    TRIG 73 11/23/2021     No results found for: CHOLHDL  Lab Results   Component Value Date    ALT 15 06/15/2022    AST 14 06/15/2022    ALKPHOS 96 06/15/2022    BILITOT 0.4 06/15/2022                      Essential hypertension    Overview     Last Assessment & Plan:   Formatting of this note might be different from the original.    The current medical regimen is effective;  continue present plan and  medications.  The patient presents with essential hypertension.  The patient is tolerating the medication well and is in excellent compliance.  The patient is experiencing no side effects.  Counseling was offered regarding low salt diets.  The patient has a reduced salt intake.  The patient denies chest pain, palpitations, shortness of breath, dyspnea on exertion, left or murmur neck pain, nausea, vomiting, diaphoresis, paroxysmal nocturnal dyspnea, and orthopnea.   Hypertension Medications             chlorthalidone (HYGROTEN) 25 MG Tab Take 25 mg by mouth once daily.    lisinopril (PRINIVIL,ZESTRIL) 40 MG tablet Take 40 mg by mouth once daily.                          ID    At risk for infection due to surgical procedure    Overview     Pt s/p lumbar surgical procedure in which pt has increasing tenderness and warmth along the surgical incision. Area is clean dry intact. Called Dr. Parada's office and nursing staff talked to Lorelei at his office at 1005 today.  Lorelei stated that Dr. Parada recommends bactrim DS and it will be called in by their office. Pt has a visit scheduled on 6/21/2022.  Pt was notified.              Endocrine    Type 2 diabetes mellitus without complication, without long-term current use of insulin    Overview     Formatting of this note might be different from the original.  On Trulicity and Jardiance.   Stopped metformin.     The patient presents with diabetes.  The patient denies polyuria, polydipsia, polyphagia, hypoglycemia and paresthesias.  The patient's glucose control has been fair.    The patient is without retinopathy currently.  The patient has no history of neuropathy.  The patient currently complains of no podiatric problems.  The patient has excellent compliance.  Hemoglobin A1c   Date Value Ref Range Status   06/15/2022 9.0 (H) <=7.0 % Final   03/16/2022 8.9 <=7.0    11/23/2021 10.0 (H) <<=7.0 % Final     No results found for: UDANE HERNANDEZ  Diabetes Management  Status    Statin: Taking  ACE/ARB: Taking    Screening or Prevention Patient's value Goal Complete/Controlled?   HgA1C Testing and Control   Lab Results   Component Value Date    HGBA1C 9.0 (H) 06/15/2022      Annually/Less than 8% No   Lipid profile : 06/15/2022 Annually Yes   LDL control No results found for: LDLCALC Annually/Less than 100 mg/dl  No   Nephropathy screening No results found for: LABMICR  Lab Results   Component Value Date    PROTEINUA Negative 04/07/2022     No results found for: UTPCR   Annually Yes   Blood pressure BP Readings from Last 1 Encounters:   06/17/22 104/65    Less than 140/90 Yes   Dilated retinal exam : 06/30/2021 Annually Yes   Foot exam   Most Recent Foot Exam Date: Not Found Annually No       Last Assessment & Plan:   Formatting of this note might be different from the original.  The current medical regimen is effective;  continue present plan and medications.           Relevant Medications    TRULICITY 1.5 mg/0.5 mL pen injector          The patient's Health Maintenance was reviewed and the following appears to be due:   Health Maintenance Due   Topic Date Due    Hepatitis C Screening  Never done    Foot Exam  Never done    HIV Screening  Never done    Colorectal Cancer Screening  Never done    COVID-19 Vaccine (4 - Booster for Moderna series) 02/26/2022    Eye Exam  06/30/2022       (PHQ-2 data if performed)  Depression Patient Health Questionnaire 6/17/2022   Over the last two weeks how often have you been bothered by little interest or pleasure in doing things 0   Over the last two weeks how often have you been bothered by feeling down, depressed or hopeless 0   PHQ-2 Total Score 0     (PHQ-9 data if performed)  No flowsheet data found.  (LEANDRO data if performed)  No flowsheet data found.     Past Medical History:  Past Medical History:   Diagnosis Date    Acute pulmonary embolus     De Quervain's tenosynovitis     Deep vein thrombosis     Diabetes mellitus, type 2      Diabetic neuropathy     Fibroid uterus     Hyperlipidemia     Hypertension     Lumbar back pain with radiculopathy affecting lower extremity     Lumbar spondylitis     Right pontine cerebrovascular accident     Vitamin D deficiency      Past Surgical History:   Procedure Laterality Date    BACK SURGERY      Collarbone SX      HIP FRACTURE SURGERY      Left Femur Sx      NECK SURGERY      REPAIR OF MENISCUS OF KNEE       Review of patient's allergies indicates:   Allergen Reactions    Coenzyme q10     Pioglitazone     Buprenorphine Rash     Other reaction(s): burn     Current Outpatient Medications on File Prior to Visit   Medication Sig Dispense Refill    aspirin (ECOTRIN) 81 MG EC tablet Take 81 mg by mouth once daily.      aspirin 81 mg Cap Take 81 mg by mouth once daily.      atorvastatin (LIPITOR) 80 MG tablet Take 80 mg by mouth once daily.      chlorthalidone (HYGROTEN) 25 MG Tab Take 25 mg by mouth once daily.      empagliflozin (JARDIANCE) 25 mg tablet Take 25 mg by mouth once daily.      ezetimibe (ZETIA) 10 mg tablet Take 10 mg by mouth every morning.      gabapentin (NEURONTIN) 600 MG tablet Take 1 tablet by mouth 3 (three) times daily.      HYDROcodone-acetaminophen (NORCO)  mg per tablet Take 1 tablet by mouth every 4 (four) hours as needed.      insulin glargine (LANTUS) 100 unit/mL injection Inject 40 Units into the skin once daily.      lisinopril (PRINIVIL,ZESTRIL) 40 MG tablet Take 40 mg by mouth once daily.      saxagliptin (ONGLYZA) 5 mg Tab tablet Take by mouth once daily.      tizanidine (ZANAFLEX) 4 MG tablet Take 4 mg by mouth 3 (three) times daily as needed.      TRULICITY 1.5 mg/0.5 mL pen injector Inject 0.5 mg into the skin once a week.      [DISCONTINUED] celecoxib (CELEBREX) 200 MG capsule Take 200 mg by mouth once daily.      [DISCONTINUED] gabapentin (NEURONTIN) 300 MG capsule Take 300 mg by mouth 3 (three) times daily.      [DISCONTINUED]  lisinopriL (PRINIVIL,ZESTRIL) 2.5 MG tablet Take 2.5 mg by mouth every morning.      [DISCONTINUED] metformin (GLUCOPHAGE) 1000 MG tablet Take 1,000 mg by mouth 2 (two) times daily with meals.       No current facility-administered medications on file prior to visit.     Social History     Socioeconomic History    Marital status: Single   Tobacco Use    Smoking status: Former Smoker    Smokeless tobacco: Never Used   Substance and Sexual Activity    Alcohol use: Not Currently    Drug use: Never    Sexual activity: Not Currently     Family History   Problem Relation Age of Onset    Cancer Mother     Coronary artery disease Mother     Coronary artery disease Father        Review of Systems   Constitutional: Negative for chills, fatigue and fever.   HENT: Negative for congestion, hearing loss, rhinorrhea, sore throat and voice change.    Eyes: Negative for visual disturbance.   Respiratory: Negative for cough, shortness of breath and wheezing.    Cardiovascular: Negative for chest pain, palpitations and leg swelling.   Gastrointestinal: Negative for abdominal pain, blood in stool, constipation, diarrhea, nausea and vomiting.   Endocrine: Negative for cold intolerance, heat intolerance, polydipsia, polyphagia and polyuria.   Genitourinary: Negative for decreased urine volume, difficulty urinating, flank pain, frequency, hematuria and urgency.   Musculoskeletal: Positive for back pain. Negative for arthralgias, gait problem, joint swelling and myalgias.   Skin: Negative for rash.   Neurological: Negative for dizziness, seizures, speech difficulty, weakness, numbness and headaches.   Hematological: Negative for adenopathy.   Psychiatric/Behavioral: Negative.  Negative for decreased concentration, dysphoric mood, hallucinations and suicidal ideas. The patient is not nervous/anxious.    All other systems reviewed and are negative.      Objective:   /65   Pulse 60   Temp 97.9 °F (36.6 °C) (Oral)   Resp 18   " Ht 5' 4" (1.626 m)   Wt 99.8 kg (220 lb)   SpO2 100%   BMI 37.76 kg/m²   Physical Exam  Vitals and nursing note reviewed.   Constitutional:       General: She is not in acute distress.     Appearance: Normal appearance. She is normal weight. She is not ill-appearing.   HENT:      Head: Normocephalic and atraumatic.   Eyes:      Extraocular Movements: Extraocular movements intact.      Conjunctiva/sclera: Conjunctivae normal.      Pupils: Pupils are equal, round, and reactive to light.   Cardiovascular:      Rate and Rhythm: Normal rate and regular rhythm.      Pulses: Normal pulses.      Heart sounds: Normal heart sounds. No murmur heard.    No friction rub. No gallop.   Pulmonary:      Effort: Pulmonary effort is normal. No respiratory distress.      Breath sounds: Normal breath sounds. No wheezing, rhonchi or rales.   Abdominal:      General: Abdomen is flat. Bowel sounds are normal.      Palpations: Abdomen is soft. There is no mass.      Tenderness: There is no abdominal tenderness. There is no guarding or rebound.      Hernia: No hernia is present.   Musculoskeletal:         General: Tenderness present. No swelling.      Cervical back: Normal range of motion and neck supple.      Right lower leg: No edema.      Left lower leg: No edema.      Comments: Wearing back brace  TTP of lumbar spine   Skin:     General: Skin is warm and dry.      Capillary Refill: Capillary refill takes less than 2 seconds.      Findings: No lesion or rash.      Comments: Increased warmth and tenderness   Neurological:      General: No focal deficit present.      Mental Status: She is alert and oriented to person, place, and time. Mental status is at baseline.      Cranial Nerves: No cranial nerve deficit.      Sensory: No sensory deficit.      Gait: Gait normal.   Psychiatric:         Mood and Affect: Mood normal.         Behavior: Behavior normal.         Thought Content: Thought content normal.         Judgment: Judgment normal. "          Lab Visit on 06/15/2022   Component Date Value Ref Range Status    Urine Microalbumin 06/15/2022 27.7  <=30.0 ug/ml Final    Urine Creatinine 06/15/2022 240.4 (A) 47.0 - 110.0 mg/dL Final    Microalbumin Creatinine Ratio 06/15/2022 11.5  0.0 - 30.0 mg/gm Cr Final    Sodium Level 06/15/2022 143  136 - 145 mmol/L Final    Potassium Level 06/15/2022 3.5  3.5 - 5.1 mmol/L Final    Chloride 06/15/2022 104  98 - 107 mmol/L Final    Carbon Dioxide 06/15/2022 28  22 - 29 mmol/L Final    Glucose Level 06/15/2022 175 (A) 74 - 100 mg/dL Final    Blood Urea Nitrogen 06/15/2022 15.0  9.8 - 20.1 mg/dL Final    Creatinine 06/15/2022 0.65  0.55 - 1.02 mg/dL Final    Calcium Level Total 06/15/2022 9.6  8.4 - 10.2 mg/dL Final    Protein Total 06/15/2022 7.2  6.4 - 8.3 gm/dL Final    Albumin Level 06/15/2022 3.7  3.5 - 5.0 gm/dL Final    Globulin 06/15/2022 3.5  2.4 - 3.5 gm/dL Final    Albumin/Globulin Ratio 06/15/2022 1.1  1.1 - 2.0 ratio Final    Bilirubin Total 06/15/2022 0.4  <=1.5 mg/dL Final    Alkaline Phosphatase 06/15/2022 96  40 - 150 unit/L Final    Alanine Aminotransferase 06/15/2022 15  0 - 55 unit/L Final    Aspartate Aminotransferase 06/15/2022 14  5 - 34 unit/L Final    Estimated GFR- 06/15/2022 >60  mls/min/1.73/m2 Final    Hemoglobin A1c 06/15/2022 9.0 (A) <=7.0 % Final    Estimated Average Glucose 06/15/2022 211.6  mg/dL Final    Cholesterol Total 06/15/2022 198  <=200 mg/dL Final    HDL Cholesterol 06/15/2022 82 (A) 35 - 60 mg/dL Final    Triglyceride 06/15/2022 69  37 - 140 mg/dL Final    Cholesterol/HDL Ratio 06/15/2022 2  0 - 5 Final    Very Low Density Lipoprotein 06/15/2022 14   Final    LDL Cholesterol 06/15/2022 102.00  50.00 - 140.00 mg/dL Final    Thyroid Stimulating Hormone 06/15/2022 2.0130  0.3500 - 4.9400 uIU/mL Final    Vit D 25 OH 06/15/2022 26.1 (A) 30.0 - 80.0 ng/mL Final    T3 Free 06/15/2022 1.70  1.57 - 3.91 pg/mL Final    Thyroxine Free  06/15/2022 0.73  0.70 - 1.48 ng/dL Final    WBC 06/15/2022 10.1  4.5 - 11.5 x10(3)/mcL Final    RBC 06/15/2022 3.90 (A) 4.20 - 5.40 x10(6)/mcL Final    Hgb 06/15/2022 10.5 (A) 12.0 - 16.0 gm/dL Final    Hct 06/15/2022 34.5 (A) 37.0 - 47.0 % Final    MCV 06/15/2022 88.5  80.0 - 94.0 fL Final    MCH 06/15/2022 26.9 (A) 27.0 - 31.0 pg Final    MCHC 06/15/2022 30.4 (A) 33.0 - 36.0 mg/dL Final    RDW 06/15/2022 12.9  11.5 - 17.0 % Final    Platelet 06/15/2022 275  130 - 400 x10(3)/mcL Final    MPV 06/15/2022 9.9  9.4 - 12.4 fL Final    Neut % 06/15/2022 54.5  % Final    Lymph % 06/15/2022 39.8  % Final    Mono % 06/15/2022 4.8  % Final    Eos % 06/15/2022 0.5  % Final    Basophil % 06/15/2022 0.3  % Final    Lymph # 06/15/2022 4.01  0.6 - 4.6 x10(3)/mcL Final    Neut # 06/15/2022 5.5  2.1 - 9.2 x10(3)/mcL Final    Mono # 06/15/2022 0.48  0.1 - 1.3 x10(3)/mcL Final    Eos # 06/15/2022 0.05  0 - 0.9 x10(3)/mcL Final    Baso # 06/15/2022 0.03  0 - 0.2 x10(3)/mcL Final    IG# 06/15/2022 0.01  0 - 0.0155 x10(3)/mcL Final    IG% 06/15/2022 0.1  0 - 0.43 % Final      Assessment:     1. Mixed hyperlipidemia    2. Essential hypertension    3. Type 2 diabetes mellitus without complication, without long-term current use of insulin    4. Spinal stenosis of lumbar region with radiculopathy    5. At risk for infection due to surgical procedure      Plan:   I am having Heather Arteaga maintain her aspirin, chlorthalidone, insulin glargine, atorvastatin, lisinopriL, SAXagliptin, tiZANidine, TRULICITY, JARDIANCE, ezetimibe, HYDROcodone-acetaminophen, gabapentin, and aspirin.  Problem List Items Addressed This Visit        Neuro    Spinal stenosis of lumbar region with radiculopathy       Cardiac/Vascular    Mixed hyperlipidemia    Essential hypertension       ID    At risk for infection due to surgical procedure       Endocrine    Type 2 diabetes mellitus without complication, without long-term current use of  insulin    Relevant Medications    TRULICITY 1.5 mg/0.5 mL pen injector          Medication List with Changes/Refills   Current Medications    ASPIRIN (ECOTRIN) 81 MG EC TABLET    Take 81 mg by mouth once daily.    ASPIRIN 81 MG CAP    Take 81 mg by mouth once daily.    ATORVASTATIN (LIPITOR) 80 MG TABLET    Take 80 mg by mouth once daily.    CHLORTHALIDONE (HYGROTEN) 25 MG TAB    Take 25 mg by mouth once daily.    EMPAGLIFLOZIN (JARDIANCE) 25 MG TABLET    Take 25 mg by mouth once daily.    EZETIMIBE (ZETIA) 10 MG TABLET    Take 10 mg by mouth every morning.    GABAPENTIN (NEURONTIN) 600 MG TABLET    Take 1 tablet by mouth 3 (three) times daily.    HYDROCODONE-ACETAMINOPHEN (NORCO)  MG PER TABLET    Take 1 tablet by mouth every 4 (four) hours as needed.    INSULIN GLARGINE (LANTUS) 100 UNIT/ML INJECTION    Inject 40 Units into the skin once daily.    LISINOPRIL (PRINIVIL,ZESTRIL) 40 MG TABLET    Take 40 mg by mouth once daily.    SAXAGLIPTIN (ONGLYZA) 5 MG TAB TABLET    Take by mouth once daily.    TIZANIDINE (ZANAFLEX) 4 MG TABLET    Take 4 mg by mouth 3 (three) times daily as needed.    TRULICITY 1.5 MG/0.5 ML PEN INJECTOR    Inject 0.5 mg into the skin once a week.   Discontinued Medications    CELECOXIB (CELEBREX) 200 MG CAPSULE    Take 200 mg by mouth once daily.    GABAPENTIN (NEURONTIN) 300 MG CAPSULE    Take 300 mg by mouth 3 (three) times daily.    LISINOPRIL (PRINIVIL,ZESTRIL) 2.5 MG TABLET    Take 2.5 mg by mouth every morning.    METFORMIN (GLUCOPHAGE) 1000 MG TABLET    Take 1,000 mg by mouth 2 (two) times daily with meals.      Previous medical history/lab work/radiology reviewed and considered during medical management decisions.   Medication list reviewed and medication reconciliation performed.  Patient was provided  and care about his/her current diagnosis (es) and medications including risk/benefit and side effects/adverse events, over the counter medication uses/doses, home self-care  and contact precautions,  and red flags and indications for when to seek immediate medical attention.   Patient was advised to continue compliance with current medication list and medical recommendations.  Recommended/ Advised continued compliance with recommended eating habits/ diets for medical conditions and exercise 150 minutes/ week (if possible) for medical condition (s).  Educational handouts and instructions on selected disease management in AVS (After Visit Summary).  All of the patient's questions were answered to patient's satisfaction.   The patient was receptive, expressed verbal understanding and agreement the above plan.    This note was created with the assistance of  M*Modal Fluency Direct voice recognition or  phone dictation. Please excuse any typographical errors that might have transpired. There may be transcription errors as a result of using this technology, however minimal effort has been made to assure accuracy of transcription, but any obvious errors or omissions should be clarified with the author of the document      Most recent labs reviewed and are within normal limits.  Most recent labs reviewed and have abnormal values:    . Medical management  to be addressed by referred to IM or FM on future visit with recommendations:  .   Follow up with  administrative management referral to Internal Medicine or Family Medicine (as of 7/8/2022) for  in about 6 weeks (around 7/29/2022) for DM2, possible surgical infection.      No orders of the defined types were placed in this encounter.      Katerine Valdivia MD

## 2022-06-17 NOTE — PATIENT INSTRUCTIONS
DASH Diet   About this topic   DASH stands for Dietary Approaches to Stop Hypertension. The DASH diet may help you lower blood pressure. It may also help keep you from getting high blood pressure. You will eat less fat and more fiber on the DASH diet.  This diet gives you more minerals that fight high blood pressure. Some nutrients in this diet are:  Potassium ? Acts to help you get rid of salt. This may help to lower blood pressure.  Calcium ? Makes blood vessels and muscles work the right way  Magnesium - Helps blood vessels relax  Fiber ? Helps you feel full. It also helps digestion.  What will the results be?   The DASH diet may help you:  Lower your blood pressure and cholesterol  Lower your risk for cancer, heart disease, heart attack, and stroke. It may also lower your risk for heart failure, kidney stones, and diabetes.  Lose weight or keep a healthy weight  What lifestyle changes are needed?   Add regular exercise to get the most help from this diet.  Try to lower stress. Find ways to relax.  Stop smoking. Avoid secondhand smoke.  Limit alcohol intake.  What changes to diet are needed?   Know about poor eating habits. Then, you can fix them as you work with the program.  This diet encourages fruits and vegetables, whole grains, lean meats, healthy fats, and low-fat or fat-free dairy products.  This diet is lower in saturated fats, trans-fats, cholesterol, added sugars, and sodium.  Who should use this diet?   This eating plan is good for the whole family. It is also good for people with high blood pressure and those at risk for high blood pressure.  What foods are good to eat?   Grains: Try to eat 6 to 8 servings of whole grain, high fiber foods each day. These are bread, cereals, brown rice, or pasta.  Fruits and vegetables: Eat 4 to 5 servings each day. Try to pick many kinds and colors. Fresh or frozen are best. Look for low sodium or salt-free if you choose canned.  Dairy: Try to eat 2 to 3 servings of  fat free and low fat milk products each day.  Lean meats, poultry, and seafood: Try to eat 6 servings or less of lean meats, poultry, and seafood each day. Try to choose more low fat or lean meats like chicken and turkey. Eat less red meat. Eat more fish instead.  Nuts, seeds, and legumes (dry beans and peas): Try to eat 4 to 5 servings each week. Try to pick nuts such as almonds and walnuts, sunflower seeds, peanut butter, soy beans, lentils, kidney beans, and split peas.  Fats and oils: Try to eat 2 to 3 servings of fats and oils each day. Eat good fats found in fish, nuts, and avocados. Try using olive oil or vegetable oils such as canola oil. Other good oils to try are corn, safflower, sunflower, or soybean oils. Use low-sodium and low-fat salad dressing and mayonnaise.  Condiments: Pepper, herbs, spices, vinegar, lemon or lime juices are great for seasoning. Be careful to choose low-sodium or salt-free products if you use broths, soups, or soy sauce.  Sweets: Try to eat less than 5 servings each week. Choose low-fat and trans fat-free desserts. These are things like fruit flavored gelatin, sorbet, jelly beans, giovanna crackers, animal crackers, low-fat fig bars, and martha snaps. Eat fruit to satisfy your desire for sweets.     What foods should be limited or avoided?   Grains: Salted breads, rolls, crackers, quick breads, self-rising flours, biscuit mixes, regular bread crumbs, instant hot cereals, commercially-prepared rice, pasta, stuffing mixes  Fruits and vegetables: Commercially-prepared potatoes and vegetable mixes, regular canned vegetables and juices, vegetables frozen with sauce or pickled vegetables, processed fruits with salt or sodium  Milk: Whole milk, malted milk, chocolate milk, buttermilk, cheese, ice cream  Meats and beans: Smoked, cured, salted, or canned fish; meats or poultry such as [place], sausages, sardines; high-fat cuts of meat like beef, lamb, or pork; chicken with the skin on it  Fats:  Cut back on solid fats like butter, lard, and margarine. Eat less food with high saturated fat, cholesterol and total fat.  Condiments and snacks: Salted and canned peas, beans, and olives; salted snack foods; fried foods; soda or other sweetened drinks  Sweets: High-fat baked goods such as muffins, donuts, pastries, commercial baked goods, candy bars  If you choose to drink alcohol, limit the amount you drink. Women should have 1 drink or less per day and men should have 2 drinks or less per day.  Helpful tips   Avoid eating canned vegetables and processed foods. These have a lot of salt in them. Look for a low-salt or low-sodium choice.  Try baking or broiling instead of frying food.  Write down the foods you eat. This will help you track what you have eaten each week.  When you go to a grocery store, have a list or a meal plan. Do not shop when you are hungry to avoid cravings for foods.  Read food labels with care. They will show you how much is in a serving. The amount is given as a percentage of the total amount you need each day. Reading labels will help you make healthy food choices.       Avoid fast foods.  Talk to your doctor or dietitian to see if you need vitamin and mineral supplements to help you balance your diet.  Talk to a dietitian for help.  Where can I learn more?   [employer]  https://www.eatright.org/health/wellness/heart-and-cardiovascular-health/dash-diet-reducing-hypertension-through-diet-and-lifestyle   [employer].org  http://familydoctor.org/familydoctor/en/prevention-wellness/food-nutrition/weight-loss/the-dash-diet-healthy-eating-to-control-your-blood-pressure.html   Last Reviewed Date   2021-03-15  Consumer Information Use and Disclaimer   This information is not specific medical advice and does not replace information you receive from your health care provider. This is only a brief summary of general information. It does NOT include all information about  conditions, illnesses, injuries, tests, procedures, treatments, therapies, discharge instructions or life-style choices that may apply to you. You must talk with your health care provider for complete information about your health and treatment options. This information should not be used to decide whether or not to accept your health care providers advice, instructions or recommendations. Only your health care provider has the knowledge and training to provide advice that is right for you.  Copyright   Copyright © 2021 [employer] and its affiliates and/or licensors. All rights reserved.

## 2022-06-20 ENCOUNTER — PATIENT MESSAGE (OUTPATIENT)
Dept: ADMINISTRATIVE | Facility: HOSPITAL | Age: 57
End: 2022-06-20
Payer: MEDICARE

## 2022-07-01 ENCOUNTER — TELEPHONE (OUTPATIENT)
Dept: FAMILY MEDICINE | Facility: CLINIC | Age: 57
End: 2022-07-01
Payer: MEDICARE

## 2022-07-01 NOTE — TELEPHONE ENCOUNTER
----- Message from Janie Aroldo sent at 7/1/2022  9:30 AM CDT -----  Regarding: Possible UTI  General Phone Message    Caller is:  ( x) Patient  (  ) Family  (  ) Pharmacy    (  ) Home Health  (  ) Other     Provider: DR FRITZ    Last Visit:    Next Visit: 7/29/2022    Reason for Call: Patient is asking for a call back. She states she thinks she may have a UTI. She thinks Dr. Fritz had prescribed something for her before. She is wanting to know if she can prescribe it again.                   Best Time to Contact:    Preferred Phone Number:

## 2022-07-28 DIAGNOSIS — E03.9 ACQUIRED HYPOTHYROIDISM: Primary | ICD-10-CM

## 2022-08-08 RX ORDER — LEVOTHYROXINE SODIUM 50 UG/1
50 TABLET ORAL
Qty: 30 TABLET | Refills: 11 | Status: SHIPPED | OUTPATIENT
Start: 2022-08-08 | End: 2024-03-21

## 2022-08-08 RX ORDER — LEVOTHYROXINE SODIUM 50 UG/1
50 TABLET ORAL
Qty: 30 TABLET | Refills: 2 | OUTPATIENT
Start: 2022-08-08 | End: 2023-08-08

## 2022-08-08 RX ORDER — LISINOPRIL 2.5 MG/1
TABLET ORAL
Qty: 30 TABLET | Refills: 2 | OUTPATIENT
Start: 2022-08-08

## 2022-08-08 RX ORDER — LEVOTHYROXINE SODIUM 50 UG/1
TABLET ORAL
Qty: 30 TABLET | Refills: 2 | OUTPATIENT
Start: 2022-08-08

## 2022-08-08 NOTE — TELEPHONE ENCOUNTER
Pt request levothyroxine 50 mcg po daily. Verified by last chart note. Lisinopril 2.5 mg vs Lisinopril 40 mg daily was not verified. Pt advised to fill medication at urgent care because of conflicting request for medication from pharmacy vs patient documentation. Will notify nursing staff to notify the patient as she will have a new provider via administrative management referral.

## 2022-08-09 ENCOUNTER — TELEPHONE (OUTPATIENT)
Dept: FAMILY MEDICINE | Facility: CLINIC | Age: 57
End: 2022-08-09
Payer: MEDICARE

## 2022-08-09 NOTE — TELEPHONE ENCOUNTER
Spoke to patient yesterday and she stated that she recently got refills from her new PCP.      ----- Message from Katerine Valdivia MD sent at 8/8/2022  4:01 PM CDT -----  Regarding: Medication refill  Please notify the patient:   Pt request levothyroxine 50 mcg po daily. Verified by last chart note and refilled. Lisinopril 2.5 mg vs Lisinopril 40 mg daily was not verified. Pt advised to fill medication at urgent care because of conflicting  dosage request for medication from pharmacy vs patient documentation. Will notify nursing staff to notify the patient as she will have a new provider via administrative management referral.   Thank you  Dr. Valdivia

## 2022-08-27 ENCOUNTER — HOSPITAL ENCOUNTER (EMERGENCY)
Facility: HOSPITAL | Age: 57
Discharge: HOME | End: 2022-08-27
Attending: STUDENT IN AN ORGANIZED HEALTH CARE EDUCATION/TRAINING PROGRAM
Payer: MEDICARE

## 2022-08-27 VITALS
OXYGEN SATURATION: 95 % | WEIGHT: 217 LBS | BODY MASS INDEX: 34.87 KG/M2 | TEMPERATURE: 98 F | HEART RATE: 77 BPM | HEIGHT: 66 IN | SYSTOLIC BLOOD PRESSURE: 129 MMHG | DIASTOLIC BLOOD PRESSURE: 66 MMHG | RESPIRATION RATE: 18 BRPM

## 2022-08-27 DIAGNOSIS — G89.29 CHRONIC LEFT-SIDED LOW BACK PAIN WITH LEFT-SIDED SCIATICA: Primary | ICD-10-CM

## 2022-08-27 DIAGNOSIS — M25.552 LEFT HIP PAIN: ICD-10-CM

## 2022-08-27 DIAGNOSIS — M25.552 HIP PAIN, LEFT: ICD-10-CM

## 2022-08-27 DIAGNOSIS — M54.42 CHRONIC LEFT-SIDED LOW BACK PAIN WITH LEFT-SIDED SCIATICA: Primary | ICD-10-CM

## 2022-08-27 PROCEDURE — 99284 EMERGENCY DEPT VISIT MOD MDM: CPT | Mod: 25

## 2022-08-27 PROCEDURE — 63600175 PHARM REV CODE 636 W HCPCS: Performed by: STUDENT IN AN ORGANIZED HEALTH CARE EDUCATION/TRAINING PROGRAM

## 2022-08-27 PROCEDURE — 96372 THER/PROPH/DIAG INJ SC/IM: CPT | Performed by: STUDENT IN AN ORGANIZED HEALTH CARE EDUCATION/TRAINING PROGRAM

## 2022-08-27 RX ORDER — PREDNISONE 20 MG/1
40 TABLET ORAL DAILY
Qty: 10 TABLET | Refills: 0 | Status: SHIPPED | OUTPATIENT
Start: 2022-08-27 | End: 2022-09-01

## 2022-08-27 RX ADMIN — METHYLPREDNISOLONE SODIUM SUCCINATE 40 MG: 40 INJECTION, POWDER, FOR SOLUTION INTRAMUSCULAR; INTRAVENOUS at 12:08

## 2022-08-28 NOTE — ED PROVIDER NOTES
Encounter Date: 8/27/2022       History     Chief Complaint   Patient presents with    Back Pain     Back/hip surgery 5/13/22. Pt has pain in Left mid back and  Left hip. Takes Norco for pain     56 F presents to Ed with L lowerback pain radiating to buttock x 1 week. Takes norco for chronic back pain,-under care of pain mgmt- has had numerous back surgeries and also has crow to L femur. Last back surgery 5/13/22.  Unsure if hardware has shifted- has pain to L hip and mid back that is chronic.  Denies numbness/tingling/fever/chills/cp/sob/or any other symptoms    Review of patient's allergies indicates:   Allergen Reactions    Coenzyme q10     Pioglitazone     Buprenorphine Rash     Other reaction(s): burn     Past Medical History:   Diagnosis Date    Acute pulmonary embolus     De Quervain's tenosynovitis     Deep vein thrombosis     Diabetes mellitus, type 2     Diabetic neuropathy     Fibroid uterus     Hyperlipidemia     Hypertension     Lumbar back pain with radiculopathy affecting lower extremity     Lumbar spondylitis     Vitamin D deficiency      Past Surgical History:   Procedure Laterality Date    BACK SURGERY      Collarbone SX      HIP FRACTURE SURGERY      Left Femur Sx      NECK SURGERY      REPAIR OF MENISCUS OF KNEE       Family History   Problem Relation Age of Onset    Cancer Mother     Coronary artery disease Mother     Coronary artery disease Father      Social History     Tobacco Use    Smoking status: Former    Smokeless tobacco: Never   Substance Use Topics    Alcohol use: Not Currently    Drug use: Never     Review of Systems   Constitutional:  Negative for fever.   HENT:  Negative for sore throat.    Respiratory:  Negative for shortness of breath.    Cardiovascular:  Negative for chest pain.   Gastrointestinal:  Negative for nausea.   Genitourinary:  Negative for dysuria.   Musculoskeletal:  Positive for arthralgias and back pain.        Neg except as stated   Skin:  Negative for rash.    Neurological:  Negative for weakness.   Hematological:  Does not bruise/bleed easily.     Physical Exam     Initial Vitals [08/27/22 1138]   BP Pulse Resp Temp SpO2   129/66 77 18 98.1 °F (36.7 °C) 95 %      MAP       --         Physical Exam    Nursing note and vitals reviewed.  Constitutional: She appears well-developed and well-nourished.   HENT:   Head: Normocephalic and atraumatic.   Eyes: EOM are normal. Pupils are equal, round, and reactive to light.   Neck: Neck supple.   Normal range of motion.  Cardiovascular:  Normal rate, regular rhythm, normal heart sounds and intact distal pulses.           Pulmonary/Chest: Breath sounds normal. No respiratory distress. She has no wheezes.   Abdominal: Abdomen is soft. Bowel sounds are normal.   Musculoskeletal:      Cervical back: Normal range of motion and neck supple.      Comments: Well healed mid back incision  L sided lumbar msk pain/tenderness to upper buttock  No point tenderness     Neurological: She is alert and oriented to person, place, and time. She has normal strength.   Skin: Skin is warm and dry.   Psychiatric: She has a normal mood and affect.       ED Course   Procedures  Labs Reviewed - No data to display       Imaging Results              X-Ray Femur Ap/Lat Left (Final result)  Result time 08/27/22 13:39:33      Final result by Gilberto Childress MD (08/27/22 13:39:33)                   Impression:      Old posttraumatic and postsurgical changes in the left femur.  No evidence of acute process seen      Electronically signed by: Gilberto Childress  Date:    08/27/2022  Time:    13:39               Narrative:    EXAMINATION:  XR FEMUR 2 VIEW LEFT    CLINICAL HISTORY:  Pain in left hip    TECHNIQUE:  AP and lateral views of the left femur were performed.    COMPARISON:  08/20/2020    FINDINGS:  The patient has an intramedullary crow in the left femur and a dynamic compression screw in the proximal left femur.  There is a old fracture of the  midshaft of the left femur which was seen on the prior examination as well.  The fracture appears to have healed and is no evidence of acute fracture seen.  There is some exuberant callus formation seen at the healed fracture site.  This was seen on the prior examination from 08/22/2020 as well.  There is area of heterotopic bone formation seen superior to the greater trochanter which is stable since the prior examination.  No evidence of acute fracture or dislocation is seen.                                       X-Ray Lumbar Spine Ap And Lateral (Final result)  Result time 08/27/22 13:45:01      Final result by Gilberto Childress MD (08/27/22 13:45:01)                   Impression:      Findings seen consistent with spinal fusion and laminectomy at the level of L2 through L5 with multilevel degenerative changes seen as outlined above    Other incidental findings as outlined above      Electronically signed by: Gilberto Childress  Date:    08/27/2022  Time:    13:45               Narrative:    EXAMINATION:  XR LUMBAR SPINE AP AND LATERAL    CLINICAL HISTORY:  back pain;    TECHNIQUE:  AP, lateral and spot images were performed of the lumbar spine.    COMPARISON:  08/20/2016    FINDINGS:  The patient is status post spinal fusion and laminectomy at the level of L2 through L5.  Pedicle screws are seen at all of those levels.  There is multilevel facet arthropathy seen.  There is foraminal stenosis seen at L4-L5 and L5-S1.  There is mild anterolisthesis of L3 on L4 that measures 5 mm.  No evidence of acute fracture or dislocation is seen.    The patient has a vascular stent in the region of the iliac vein on the left side.                                       X-Ray Hip 2 or 3 views Left (with Pelvis when performed) (Final result)  Result time 08/27/22 13:46:14      Final result by Tyler Oconnell MD (08/27/22 13:46:14)                   Impression:      1. No convincing acute radiographic abnormality.  2. Chronic  secondary details discussed above, similar in the interval.      Electronically signed by: Tyler Oconnell  Date:    08/27/2022  Time:    13:46               Narrative:    EXAMINATION:  XR HIP WITH PELVIS WHEN PERFORMED, 2 OR 3 VIEWS LEFT    CLINICAL HISTORY:  Pain in left hip;    TECHNIQUE:  Frontal and frog-leg views of the hip.    COMPARISON:  16 March 2022    FINDINGS:  Chronic alterations of the regional osseous structures are again noted, with similar appearance of healed proximal left femoral shaft fracture and associated internal fixation hardware.  No displaced fracture or dislocation is identified. The visualized pelvic ring structures and articular spaces are preserved. There is no suggestion of large joint effusion. No aggressive osseous lesion or periosteal reaction is appreciated. The trabecular pattern is unremarkable.    No acute soft tissue abnormality is identified.  Widespread vascular calcifications and intrapelvic structure consistent with calcified fibroid are similar in comparison.                                    X-Rays:   Independently Interpreted Readings:   Other Readings:  No acute fracture or displacement   Medications   methylPREDNISolone sodium succinate injection 40 mg (40 mg Intramuscular Given 8/27/22 1258)                          Clinical Impression:   Final diagnoses:  [M25.552] Hip pain, left  [M25.552] Left hip pain  [M54.42, G89.29] Chronic left-sided low back pain with left-sided sciatica (Primary)        ED Disposition Condition    Discharge Stable          ED Prescriptions       Medication Sig Dispense Start Date End Date Auth. Provider    predniSONE (DELTASONE) 20 MG tablet Take 2 tablets (40 mg total) by mouth once daily. for 5 days 10 tablet 8/27/2022 9/1/2022 Yoli Palma MD          Follow-up Information       Follow up With Specialties Details Why Contact Info    Irene Andrade MD Family Medicine In 1 week  [address] [place] [address]  [id number]  [phone]       pain management                 Yoli Palma MD  08/28/22 0866

## 2022-09-06 ENCOUNTER — OFFICE VISIT (OUTPATIENT)
Dept: ORTHOPEDICS | Facility: CLINIC | Age: 57
End: 2022-09-06
Payer: MEDICARE

## 2022-09-06 VITALS
HEIGHT: 66 IN | SYSTOLIC BLOOD PRESSURE: 129 MMHG | BODY MASS INDEX: 34.87 KG/M2 | HEART RATE: 77 BPM | WEIGHT: 217 LBS | DIASTOLIC BLOOD PRESSURE: 66 MMHG

## 2022-09-06 DIAGNOSIS — M70.62 GREATER TROCHANTERIC BURSITIS OF LEFT HIP: Primary | ICD-10-CM

## 2022-09-06 PROCEDURE — 99214 PR OFFICE/OUTPT VISIT, EST, LEVL IV, 30-39 MIN: ICD-10-PCS | Mod: ,,, | Performed by: ORTHOPAEDIC SURGERY

## 2022-09-06 PROCEDURE — 99214 OFFICE O/P EST MOD 30 MIN: CPT | Mod: ,,, | Performed by: ORTHOPAEDIC SURGERY

## 2022-09-21 ENCOUNTER — HISTORICAL (OUTPATIENT)
Dept: ADMINISTRATIVE | Facility: HOSPITAL | Age: 57
End: 2022-09-21
Payer: MEDICARE

## 2022-10-01 ENCOUNTER — DOCUMENTATION ONLY (OUTPATIENT)
Dept: PRIMARY CARE CLINIC | Facility: CLINIC | Age: 57
End: 2022-10-01
Payer: MEDICARE

## 2022-10-13 ENCOUNTER — OFFICE VISIT (OUTPATIENT)
Dept: GYNECOLOGY | Facility: CLINIC | Age: 57
End: 2022-10-13
Payer: MEDICARE

## 2022-10-13 VITALS
OXYGEN SATURATION: 98 % | DIASTOLIC BLOOD PRESSURE: 75 MMHG | TEMPERATURE: 98 F | HEART RATE: 71 BPM | WEIGHT: 205 LBS | SYSTOLIC BLOOD PRESSURE: 116 MMHG | RESPIRATION RATE: 18 BRPM | BODY MASS INDEX: 32.95 KG/M2 | HEIGHT: 66 IN

## 2022-10-13 DIAGNOSIS — Z01.419 WOMEN'S ANNUAL ROUTINE GYNECOLOGICAL EXAMINATION: Primary | ICD-10-CM

## 2022-10-13 DIAGNOSIS — R23.2 HOT FLASHES: ICD-10-CM

## 2022-10-13 DIAGNOSIS — R82.90 BAD ODOR OF URINE: ICD-10-CM

## 2022-10-13 LAB
APPEARANCE UR: ABNORMAL
BACTERIA #/AREA URNS AUTO: ABNORMAL /HPF
BILIRUB SERPL-MCNC: NEGATIVE MG/DL
BILIRUB UR QL STRIP.AUTO: NEGATIVE MG/DL
BLOOD URINE, POC: NORMAL
CLUE CELLS VAG QL WET PREP: ABNORMAL
COLOR UR AUTO: ABNORMAL
COLOR, POC UA: YELLOW
GLUCOSE UR QL STRIP.AUTO: ABNORMAL MG/DL
GLUCOSE UR QL STRIP: NORMAL
HYALINE CASTS #/AREA URNS LPF: ABNORMAL /LPF
KETONES UR QL STRIP.AUTO: NEGATIVE MG/DL
KETONES UR QL STRIP: NORMAL
LEUKOCYTE ESTERASE UR QL STRIP.AUTO: 250 UNIT/L
LEUKOCYTE ESTERASE URINE, POC: NORMAL
MUCOUS THREADS URNS QL MICRO: ABNORMAL /LPF
NITRITE UR QL STRIP.AUTO: ABNORMAL
NITRITE, POC UA: POSITIVE
PH UR STRIP.AUTO: 5 [PH]
PH, POC UA: 5.5
PROT UR QL STRIP.AUTO: NEGATIVE MG/DL
PROTEIN, POC: NEGATIVE
RBC #/AREA URNS AUTO: ABNORMAL /HPF
RBC UR QL AUTO: NEGATIVE UNIT/L
SP GR UR STRIP.AUTO: 1.03
SPECIFIC GRAVITY, POC UA: 1.02
SQUAMOUS #/AREA URNS LPF: ABNORMAL /HPF
T VAGINALIS VAG QL WET PREP: ABNORMAL
UROBILINOGEN UR STRIP-ACNC: NORMAL MG/DL
UROBILINOGEN, POC UA: NORMAL
WBC #/AREA URNS AUTO: >100 /HPF
WBC #/AREA VAG WET PREP: ABNORMAL
WBC CLUMPS UR QL AUTO: ABNORMAL /HPF
YEAST SPEC QL WET PREP: ABNORMAL

## 2022-10-13 PROCEDURE — 88142 CYTOPATH C/V THIN LAYER: CPT | Performed by: NURSE PRACTITIONER

## 2022-10-13 PROCEDURE — 99215 OFFICE O/P EST HI 40 MIN: CPT | Mod: PBBFAC | Performed by: NURSE PRACTITIONER

## 2022-10-13 PROCEDURE — 87186 SC STD MICRODIL/AGAR DIL: CPT | Performed by: NURSE PRACTITIONER

## 2022-10-13 PROCEDURE — 81001 URINALYSIS AUTO W/SCOPE: CPT | Mod: PBBFAC | Performed by: NURSE PRACTITIONER

## 2022-10-13 PROCEDURE — 87210 SMEAR WET MOUNT SALINE/INK: CPT | Performed by: NURSE PRACTITIONER

## 2022-10-13 PROCEDURE — G0101 PR CA SCREEN;PELVIC/BREAST EXAM: ICD-10-PCS | Mod: GZ,S$PBB,, | Performed by: NURSE PRACTITIONER

## 2022-10-13 PROCEDURE — 81001 URINALYSIS AUTO W/SCOPE: CPT | Performed by: NURSE PRACTITIONER

## 2022-10-13 PROCEDURE — 87491 CHLMYD TRACH DNA AMP PROBE: CPT | Performed by: NURSE PRACTITIONER

## 2022-10-13 PROCEDURE — 87624 HPV HI-RISK TYP POOLED RSLT: CPT | Performed by: NURSE PRACTITIONER

## 2022-10-13 PROCEDURE — G0101 CA SCREEN;PELVIC/BREAST EXAM: HCPCS | Mod: GZ,S$PBB,, | Performed by: NURSE PRACTITIONER

## 2022-10-13 PROCEDURE — 87591 N.GONORRHOEAE DNA AMP PROB: CPT | Performed by: NURSE PRACTITIONER

## 2022-10-13 RX ORDER — NALOXONE HYDROCHLORIDE 4 MG/.1ML
SPRAY NASAL
COMMUNITY
Start: 2022-09-08

## 2022-10-13 RX ORDER — SULFAMETHOXAZOLE AND TRIMETHOPRIM 800; 160 MG/1; MG/1
1 TABLET ORAL 2 TIMES DAILY
Qty: 10 TABLET | Refills: 0 | Status: SHIPPED | OUTPATIENT
Start: 2022-10-13 | End: 2022-10-18

## 2022-10-13 RX ORDER — VENLAFAXINE HYDROCHLORIDE 37.5 MG/1
37.5 CAPSULE, EXTENDED RELEASE ORAL DAILY
Qty: 30 CAPSULE | Refills: 11 | Status: SHIPPED | OUTPATIENT
Start: 2022-10-13 | End: 2023-04-03

## 2022-10-13 RX ORDER — NALOXEGOL OXALATE 25 MG/1
25 TABLET, FILM COATED ORAL DAILY
COMMUNITY
Start: 2022-09-29

## 2022-10-13 NOTE — PROGRESS NOTES
"Patient ID: Heather Arteaga is a 56 y.o. female.    Chief Complaint: No chief complaint on file.      Review of patient's allergies indicates:   Allergen Reactions    Coenzyme q10     Pioglitazone     Buprenorphine Rash     Other reaction(s): burn         Past Medical History:   Diagnosis Date    Acute pulmonary embolus     De Quervain's tenosynovitis     Deep vein thrombosis     Diabetes mellitus, type 2     Diabetic neuropathy     Fibroid uterus     Hyperlipidemia     Hypertension     Lumbar back pain with radiculopathy affecting lower extremity     Lumbar spondylitis     Vitamin D deficiency         HPI:  Pt is G0 here for annual gyn exam. Denies hx of abnormal pap. Last pap 9/2019=NIL;HPV negative. Reports hx of uterine embolization secondary to AUB-L in 2017. Denies any vaginal bleeding/discharge. Denies abd/pelvic pain. Today, pt's only complaint is foul odor to urine. Denies dysuria/hematuria/frequency.  Admits to improper wiping technique.Denies hx of renal stones. Pt is former smoker. Quit over 15 years ago and smoked for 10 yr duration. Pt is sexually active .Denies new partner. Pt was seen 3/2022 for complaint of hot flashes and was given a prescription for venlfaxine. Pt states never picked up from the pharmacy. She is still having hot flashes. HRT contraindicated secondary to comorbidities.   PMHx: PE (2015-took xarelto x 1 year), DM, HTN, Hypothyroidism  Review of Systems:   Negative except for findings in HPI     Objective:   /75 (BP Location: Left arm, Patient Position: Sitting, BP Method: Large (Automatic))   Pulse 71   Temp 97.7 °F (36.5 °C)   Resp 18   Ht 5' 6" (1.676 m)   Wt 93 kg (205 lb)   SpO2 98%   BMI 33.09 kg/m²    Physical Exam:  GENERAL: Pt is aware and alert and  in no acute distress.  BREASTS: Bilateral-No masses, nipple discharge, skin changes, tenderness.  ABDOMEN: Soft, non tender.  VULVA:  No lesions or skin changes.  URETHRA: No lesions  BLADDER: No " tenderness.  VAGINA: Mucosa pale, scant amount of white discharge ;no lesions.  CERVIX:  no CMT, NO discharge; NO lesions  BIMANUAL EXAM:  The uterus is mobile, nontender, no palpable masses. Bals adnexa reveal no evidence of masses; no fullness   SKIN: Warm and Dry  PSYCHIATRIC: Patient is awake and alert. Mood and affect are normal.    Assessment:   Women's annual routine gynecological examination  -     Liquid-Based Pap Smear, Screening Screening    Bad odor of urine  -     POCT urinalysis, dipstick or tablet reag  -     Wet Prep, Genital  -     Urinalysis, Reflex to Urine Culture Urine, Clean Catch    Hot flashes    Other orders  -     venlafaxine (EFFEXOR XR) 37.5 MG 24 hr capsule; Take 1 capsule (37.5 mg total) by mouth once daily.  Dispense: 30 capsule; Refill: 11  -     sulfamethoxazole-trimethoprim 800-160mg (BACTRIM DS) 800-160 mg Tab; Take 1 tablet by mouth 2 (two) times daily. for 5 days  Dispense: 10 tablet; Refill: 0          1. Women's annual routine gynecological examination    2. Bad odor of urine    3. Hot flashes               -pap/hpv  -mammogram already ordered by pcp  - Contact clinic with any vaginal bleeding as this would be abnormal in postmenopausal pt. Pt states takes ca/vit d supplement daily  -educated pt on proper wiping technique and encouraged her to empty her bladder before and after intercourse  -ua with +nitrites in clinic; will begin bactrim and send ua/urine cx  -refill venlafaxine; do not stop abruptly; f/u in 8 weeks  Plan:       Follow up in about 8 weeks (around 12/8/2022).

## 2022-10-15 LAB — BACTERIA UR CULT: ABNORMAL

## 2022-10-18 ENCOUNTER — OFFICE VISIT (OUTPATIENT)
Dept: ORTHOPEDICS | Facility: CLINIC | Age: 57
End: 2022-10-18
Payer: MEDICARE

## 2022-10-18 ENCOUNTER — TELEPHONE (OUTPATIENT)
Dept: GYNECOLOGY | Facility: CLINIC | Age: 57
End: 2022-10-18
Payer: MEDICARE

## 2022-10-18 VITALS
BODY MASS INDEX: 32.95 KG/M2 | HEIGHT: 66 IN | SYSTOLIC BLOOD PRESSURE: 112 MMHG | DIASTOLIC BLOOD PRESSURE: 70 MMHG | WEIGHT: 205 LBS | HEART RATE: 76 BPM

## 2022-10-18 DIAGNOSIS — M70.62 GREATER TROCHANTERIC BURSITIS OF BOTH HIPS: Primary | ICD-10-CM

## 2022-10-18 DIAGNOSIS — M70.61 GREATER TROCHANTERIC BURSITIS OF BOTH HIPS: Primary | ICD-10-CM

## 2022-10-18 PROCEDURE — 99213 PR OFFICE/OUTPT VISIT, EST, LEVL III, 20-29 MIN: ICD-10-PCS | Mod: ,,, | Performed by: ORTHOPAEDIC SURGERY

## 2022-10-18 PROCEDURE — 99213 OFFICE O/P EST LOW 20 MIN: CPT | Mod: ,,, | Performed by: ORTHOPAEDIC SURGERY

## 2022-10-18 RX ORDER — NITROFURANTOIN 25; 75 MG/1; MG/1
100 CAPSULE ORAL 2 TIMES DAILY
Qty: 10 CAPSULE | Refills: 0 | Status: SHIPPED | OUTPATIENT
Start: 2022-10-18 | End: 2022-10-23

## 2022-10-18 NOTE — TELEPHONE ENCOUNTER
Please inform pt that I received her urine culture and her infection is resistant to the bactrim. If she has not completed the prescription, she may stop taking and begin the new prescription that I sent to her pharmacy. Please instruct her to complete all 5 days of the new prescription.   none

## 2022-10-18 NOTE — PROGRESS NOTES
Past Medical History:   Diagnosis Date    Acute pulmonary embolus     De Quervain's tenosynovitis     Deep vein thrombosis     Diabetes mellitus, type 2     Diabetic neuropathy     Fibroid uterus     Hyperlipidemia     Hypertension     Lumbar back pain with radiculopathy affecting lower extremity     Lumbar spondylitis     Vitamin D deficiency        Past Surgical History:   Procedure Laterality Date    BACK SURGERY      CHOLECYSTECTOMY  2002    Collarbone SX      FRACTURE SURGERY  2009    Total Hip Replacenwnt    HIP FRACTURE SURGERY      JOINT REPLACEMENT      Hop Replacenent    Left Femur Sx      NECK SURGERY      REPAIR OF MENISCUS OF KNEE      SPINE SURGERY  2022    Back Surgery       Current Outpatient Medications   Medication Sig    aspirin (ECOTRIN) 81 MG EC tablet Take 81 mg by mouth once daily.    aspirin 81 mg Cap Take 81 mg by mouth once daily.    atorvastatin (LIPITOR) 80 MG tablet Take 80 mg by mouth once daily.    chlorthalidone (HYGROTEN) 25 MG Tab Take 25 mg by mouth once daily.    empagliflozin (JARDIANCE) 25 mg tablet Take 25 mg by mouth once daily.    ezetimibe (ZETIA) 10 mg tablet Take 10 mg by mouth every morning.    gabapentin (NEURONTIN) 600 MG tablet Take 1 tablet by mouth 3 (three) times daily.    HYDROcodone-acetaminophen (NORCO)  mg per tablet Take 1 tablet by mouth every 4 (four) hours as needed.    insulin glargine (LANTUS) 100 unit/mL injection Inject 40 Units into the skin once daily.    levothyroxine (SYNTHROID) 50 MCG tablet Take 1 tablet (50 mcg total) by mouth before breakfast.    lisinopril (PRINIVIL,ZESTRIL) 40 MG tablet Take 40 mg by mouth once daily.    MOVANTIK 25 mg tablet Take 25 mg by mouth once daily.    naloxone (NARCAN) 4 mg/actuation Spry SMARTSI Spray(s) Both Nares    nitrofurantoin, macrocrystal-monohydrate, (MACROBID) 100 MG capsule Take 1 capsule (100 mg total) by mouth 2 (two) times daily. for 5 days    REPATHA PUSHTRONEX 420 mg/3.5 mL  Injt inject 3.5 mls sub-q once per month    saxagliptin (ONGLYZA) 5 mg Tab tablet Take by mouth once daily.    sulfamethoxazole-trimethoprim 800-160mg (BACTRIM DS) 800-160 mg Tab Take 1 tablet by mouth 2 (two) times daily. for 5 days    tizanidine (ZANAFLEX) 4 MG tablet Take 4 mg by mouth 3 (three) times daily as needed.    TRULICITY 1.5 mg/0.5 mL pen injector Inject 0.5 mg into the skin once a week.    venlafaxine (EFFEXOR XR) 37.5 MG 24 hr capsule Take 1 capsule (37.5 mg total) by mouth once daily.     No current facility-administered medications for this visit.       Review of patient's allergies indicates:   Allergen Reactions    Coenzyme q10     Pioglitazone     Buprenorphine Rash     Other reaction(s): burn       Family History   Problem Relation Age of Onset    Cancer Mother     Coronary artery disease Mother     COPD Mother             Coronary artery disease Father     Diabetes Father     Heart disease Sister        Social History     Socioeconomic History    Marital status: Single   Tobacco Use    Smoking status: Former    Smokeless tobacco: Never   Substance and Sexual Activity    Alcohol use: Not Currently    Drug use: Never    Sexual activity: Not Currently       Chief Complaint:   Chief Complaint   Patient presents with    Left Hip - Follow-up     Follow up for left hip pain. Left hip is doing good. Still in PT and been helping. Pain has been better.        History of present illness:  56-year-old female presents today for evaluation and follow-up of a bilateral hip pain.  We center initially physical therapy for trochanteric bursitis left hip.  She feels much improved.  Does not pain free however is significant improved from her previous level. also with a history of total hip on the right side with some ongoing pain.  Therapy has been working on this as well.      Review of Systems:    Constitution: Negative for chills, fever, and sweats.  Negative for unexplained weight loss.    HENT:   Negative for headaches and blurry vision.    Cardiovascular:Negative for chest pain or irregular heart beat. Negative for hypertension.    Respiratory:  Negative for cough and shortness of breath.    Gastrointestinal: Negative for abdominal pain, heartburn, melena, nausea, and vomitting.    Genitourinary:  Negative bladder incontinence and dysuria.    Musculoskeletal:  See HPI    Neurological: Negative for numbness.    Psychiatric/Behavioral: Negative for depression.  The patient is not nervous/anxious.      Endocrine: Negative for polyuria    Hematologic/Lymphatic: Negative for bleeding problem.  Does not bruise/bleed easily.    Skin: Negative for poor would healing and rash      Physical Examination:    Vital Signs:    Vitals:    10/18/22 1342   BP: 112/70   Pulse: 76       Body mass index is 33.09 kg/m².    General: No acute distress, alert and oriented, healthy appearing    HEENT: Head is atraumatic, mucous membranes are moist    Neck: Supples, no JVD    Cardiovascular: Palpable dorsalis pedis and posterior tibial pulses, regular rate and rhythm to those pulses    Lungs: Breathing non-labored    Skin: no rashes appreciated    Neurologic: Can flex and extend knees, ankles, and toes. Sensation is grossly intact    Left hip:  Mild tenderness of the trochanteric bursa.  Brisk cap with this.  Station distally. Range of motion of both hips without significant pain in the groin.    X-rays:      Assessment::  Bilateral trochanteric bursitis    Plan:  Discussed all treatment options patient.  She would continue physical therapy.  We will give her new prescription today.  She can follow up on an as-needed basis for any ongoing issues troubles questions or concerns.    This note was created using [employer] voice recognition software that occasionally misinterpreted phrases or words.    Consult note is delivered via Epic messaging service.

## 2022-10-19 LAB
CHLAMYDIA TRACHOMATIS (PRECISION): NEGATIVE
HPV16+18+H RISK 12 DNA CVX-IMP: NEGATIVE
INSULIN SERPL-ACNC: NORMAL U[IU]/ML
LAB AP BETHESDA CATEGORY: NORMAL
LAB AP CLINICAL FINDINGS: NORMAL
LAB AP CONTRACEPTIVES: NORMAL
LAB AP GYN MOLECULAR TESTING: NORMAL
LAB AP LMP DATE: NORMAL
LAB AP OCHS PAP SPECIMEN ADEQUACY: NORMAL
LAB AP OHS PAP INTERPRETATION: NORMAL
LAB AP PAP DISCLAIMER COMMENTS: NORMAL
LAB AP PAP ESTROGEN REPLACEMENT THERAPY: NORMAL
LAB AP PAP PMP: NORMAL
LAB AP PAP PREVIOUS BX: NORMAL
LAB AP PAP PRIOR TREATMENT: NORMAL
NEISSERIA GONORRHOEAE (PRECISION): NEGATIVE

## 2022-12-12 ENCOUNTER — HOSPITAL ENCOUNTER (OUTPATIENT)
Dept: RADIOLOGY | Facility: HOSPITAL | Age: 57
Discharge: HOME | End: 2022-12-12
Attending: SURGERY
Payer: MEDICARE

## 2022-12-12 DIAGNOSIS — Z12.31 BREAST CANCER SCREENING BY MAMMOGRAM: ICD-10-CM

## 2022-12-12 PROCEDURE — 77067 MAMMO DIGITAL SCREENING BILAT WITH TOMO: ICD-10-PCS | Mod: 26,,, | Performed by: RADIOLOGY

## 2022-12-12 PROCEDURE — 77063 BREAST TOMOSYNTHESIS BI: CPT | Mod: 26,,, | Performed by: RADIOLOGY

## 2022-12-12 PROCEDURE — 77063 BREAST TOMOSYNTHESIS BI: CPT | Mod: TC

## 2022-12-12 PROCEDURE — 77067 SCR MAMMO BI INCL CAD: CPT | Mod: 26,,, | Performed by: RADIOLOGY

## 2022-12-12 PROCEDURE — 77063 MAMMO DIGITAL SCREENING BILAT WITH TOMO: ICD-10-PCS | Mod: 26,,, | Performed by: RADIOLOGY

## 2022-12-20 DIAGNOSIS — R10.11 ABDOMINAL PAIN, RIGHT UPPER QUADRANT: Primary | ICD-10-CM

## 2023-01-02 ENCOUNTER — PATIENT MESSAGE (OUTPATIENT)
Dept: ADMINISTRATIVE | Facility: HOSPITAL | Age: 58
End: 2023-01-02
Payer: MEDICARE

## 2023-01-03 ENCOUNTER — HOSPITAL ENCOUNTER (OUTPATIENT)
Dept: RADIOLOGY | Facility: HOSPITAL | Age: 58
Discharge: HOME | End: 2023-01-03
Attending: SURGERY
Payer: MEDICARE

## 2023-01-03 DIAGNOSIS — R10.11 ABDOMINAL PAIN, RIGHT UPPER QUADRANT: ICD-10-CM

## 2023-01-03 PROCEDURE — 74240 X-RAY XM UPR GI TRC 1CNTRST: CPT | Mod: TC

## 2023-01-03 PROCEDURE — 76700 US EXAM ABDOM COMPLETE: CPT | Mod: TC

## 2023-01-03 PROCEDURE — 25500020 PHARM REV CODE 255: Performed by: SURGERY

## 2023-01-03 PROCEDURE — A9698 NON-RAD CONTRAST MATERIALNOC: HCPCS | Performed by: SURGERY

## 2023-01-03 PROCEDURE — 74248 X-RAY SM INT F-THRU STD: CPT | Mod: TC

## 2023-01-03 RX ADMIN — BARIUM SULFATE 137 ML: 980 POWDER, FOR SUSPENSION ORAL at 09:01

## 2023-01-03 RX ADMIN — Medication 176 G: at 09:01

## 2023-01-09 ENCOUNTER — OFFICE VISIT (OUTPATIENT)
Dept: ORTHOPEDICS | Facility: CLINIC | Age: 58
End: 2023-01-09
Payer: MEDICARE

## 2023-01-09 ENCOUNTER — HOSPITAL ENCOUNTER (OUTPATIENT)
Dept: RADIOLOGY | Facility: CLINIC | Age: 58
Discharge: HOME | End: 2023-01-09
Attending: STUDENT IN AN ORGANIZED HEALTH CARE EDUCATION/TRAINING PROGRAM
Payer: MEDICARE

## 2023-01-09 VITALS — BODY MASS INDEX: 34.45 KG/M2 | WEIGHT: 214.38 LBS | HEIGHT: 66 IN

## 2023-01-09 DIAGNOSIS — M79.642 LEFT HAND PAIN: Primary | ICD-10-CM

## 2023-01-09 DIAGNOSIS — M65.4 RADIAL STYLOID TENOSYNOVITIS OF LEFT HAND: ICD-10-CM

## 2023-01-09 DIAGNOSIS — M79.642 LEFT HAND PAIN: ICD-10-CM

## 2023-01-09 PROCEDURE — 73130 XR HAND COMPLETE 3 VIEW LEFT: ICD-10-PCS | Mod: LT,,, | Performed by: STUDENT IN AN ORGANIZED HEALTH CARE EDUCATION/TRAINING PROGRAM

## 2023-01-09 PROCEDURE — 99203 OFFICE O/P NEW LOW 30 MIN: CPT | Mod: 25,,, | Performed by: STUDENT IN AN ORGANIZED HEALTH CARE EDUCATION/TRAINING PROGRAM

## 2023-01-09 PROCEDURE — 73130 X-RAY EXAM OF HAND: CPT | Mod: LT,,, | Performed by: STUDENT IN AN ORGANIZED HEALTH CARE EDUCATION/TRAINING PROGRAM

## 2023-01-09 PROCEDURE — 20550 TENDON SHEATH: ICD-10-PCS | Mod: LT,,, | Performed by: STUDENT IN AN ORGANIZED HEALTH CARE EDUCATION/TRAINING PROGRAM

## 2023-01-09 PROCEDURE — 99203 PR OFFICE/OUTPT VISIT, NEW, LEVL III, 30-44 MIN: ICD-10-PCS | Mod: 25,,, | Performed by: STUDENT IN AN ORGANIZED HEALTH CARE EDUCATION/TRAINING PROGRAM

## 2023-01-09 PROCEDURE — 20550 NJX 1 TENDON SHEATH/LIGAMENT: CPT | Mod: LT,,, | Performed by: STUDENT IN AN ORGANIZED HEALTH CARE EDUCATION/TRAINING PROGRAM

## 2023-01-09 RX ADMIN — BETAMETHASONE SODIUM PHOSPHATE AND BETAMETHASONE ACETATE 6 MG: 3; 3 INJECTION, SUSPENSION INTRA-ARTICULAR; INTRALESIONAL; INTRAMUSCULAR; SOFT TISSUE at 01:01

## 2023-01-09 RX ADMIN — LIDOCAINE HYDROCHLORIDE 1 ML: 10 INJECTION INFILTRATION; PERINEURAL at 01:01

## 2023-01-11 RX ORDER — LIDOCAINE HYDROCHLORIDE 10 MG/ML
1 INJECTION INFILTRATION; PERINEURAL
Status: DISCONTINUED | OUTPATIENT
Start: 2023-01-09 | End: 2023-01-11 | Stop reason: HOSPADM

## 2023-01-11 RX ORDER — BETAMETHASONE SODIUM PHOSPHATE AND BETAMETHASONE ACETATE 3; 3 MG/ML; MG/ML
6 INJECTION, SUSPENSION INTRA-ARTICULAR; INTRALESIONAL; INTRAMUSCULAR; SOFT TISSUE
Status: DISCONTINUED | OUTPATIENT
Start: 2023-01-09 | End: 2023-01-11 | Stop reason: HOSPADM

## 2023-01-11 NOTE — PROCEDURES
Tendon Sheath    Date/Time: 1/9/2023 1:00 PM  Performed by: Mio Salomon MD  Authorized by: Mio Salomon MD     Consent Done?:  Yes (Verbal)  Indications:  Pain  Timeout: prior to procedure the correct patient, procedure, and site was verified    Location:  Wrist  Site:  L first doral compartment  Needle size:  25 G  Approach:  Radial  Medications:  1 mL LIDOcaine HCL 10 mg/ml (1%) 10 mg/mL (1 %); 6 mg betamethasone acetate-betamethasone sodium phosphate 6 mg/mL  Patient tolerance:  Patient tolerated the procedure well with no immediate complications

## 2023-01-11 NOTE — PROGRESS NOTES
Chief Complaint:  Left wrist pain    Consulting Physician: No ref. provider found    History of present illness:    Patient is a 57-year-old female who presents for initial evaluation of her left wrist pain.  She is had left wrist pain for about 2 months now.  It has gradually been getting worse.  She localizes the pain to the radial aspect of the wrist.  She states it is worse with motions.  She occasionally gets a burning and tingling sensation in his area.  She is tried brief immobilization without significant relief.  She is not tried therapy for this.  She is never had surgery for this.  She denies any numbness or tingling radiating to the fingertips.  She is not had an injection.    Past Medical History:   Diagnosis Date    Acute pulmonary embolus     De Quervain's tenosynovitis     Deep vein thrombosis     Diabetes mellitus, type 2     Diabetic neuropathy     Fibroid uterus     Hyperlipidemia     Hypertension     Lumbar back pain with radiculopathy affecting lower extremity     Lumbar spondylitis     Vitamin D deficiency        Past Surgical History:   Procedure Laterality Date    BACK SURGERY      CHOLECYSTECTOMY  2002    Collarbone SX      FRACTURE SURGERY  05/1/2009    Total Hip Replacenwnt    HIP FRACTURE SURGERY      JOINT REPLACEMENT  2009    Hop Replacenent    Left Femur Sx      NECK SURGERY      REPAIR OF MENISCUS OF KNEE      SPINE SURGERY  5/13/2022    Back Surgery       Current Outpatient Medications   Medication Sig    aspirin 81 mg Cap Take 81 mg by mouth once daily.    chlorthalidone (HYGROTEN) 25 MG Tab Take 25 mg by mouth once daily.    empagliflozin (JARDIANCE) 25 mg tablet Take 25 mg by mouth once daily.    ezetimibe (ZETIA) 10 mg tablet Take 10 mg by mouth every morning.    gabapentin (NEURONTIN) 600 MG tablet Take 1 tablet by mouth 3 (three) times daily.    HYDROcodone-acetaminophen (NORCO)  mg per tablet Take 1 tablet by mouth every 4 (four) hours as needed.    levothyroxine  (SYNTHROID) 50 MCG tablet Take 1 tablet (50 mcg total) by mouth before breakfast.    lisinopril (PRINIVIL,ZESTRIL) 40 MG tablet Take 40 mg by mouth once daily.    MOVANTIK 25 mg tablet Take 25 mg by mouth once daily.    naloxone (NARCAN) 4 mg/actuation Spry SMARTSI Spray(s) Both Nares    REPATHA PUSHTRONEX 420 mg/3.5 mL Injt inject 3.5 mls sub-q once per month    tizanidine (ZANAFLEX) 4 MG tablet Take 4 mg by mouth 3 (three) times daily as needed.    TRULICITY 1.5 mg/0.5 mL pen injector Inject 0.5 mg into the skin once a week.    venlafaxine (EFFEXOR XR) 37.5 MG 24 hr capsule Take 1 capsule (37.5 mg total) by mouth once daily.    aspirin (ECOTRIN) 81 MG EC tablet Take 81 mg by mouth once daily.    atorvastatin (LIPITOR) 80 MG tablet Take 80 mg by mouth once daily.    insulin glargine (LANTUS) 100 unit/mL injection Inject 40 Units into the skin once daily.    saxagliptin (ONGLYZA) 5 mg Tab tablet Take by mouth once daily.     No current facility-administered medications for this visit.       Review of patient's allergies indicates:   Allergen Reactions    Coenzyme q10     Pioglitazone     Buprenorphine Rash     Other reaction(s): burn       Family History   Problem Relation Age of Onset    Cancer Mother     Coronary artery disease Mother     COPD Mother             Coronary artery disease Father     Diabetes Father     Heart disease Sister        Social History     Socioeconomic History    Marital status: Single   Tobacco Use    Smoking status: Former    Smokeless tobacco: Never   Substance and Sexual Activity    Alcohol use: Not Currently    Drug use: Never    Sexual activity: Not Currently       Review of Systems:    Constitution:   Denies chills, fever, and sweats.  HENT:   Denies headaches or blurry vision.  Cardiovascular:  Denies chest pain or irregular heart beat.  Respiratory:   Denies cough or shortness of breath.  Gastrointestinal:  Denies abdominal pain, nausea, or vomiting.  Musculoskeletal:   " Denies muscle cramps.  Neurological:   Denies dizziness or focal weakness.  Psychiatric/Behavior: Normal mental status.  Hematology/Lymph:  Denies bleeding problem or easy bruising/bleeding.  Skin:    Denies rash or suspicious lesions.    Examination:    Vital Signs:    Vitals:    01/09/23 1318   Weight: 97.3 kg (214 lb 6.4 oz)   Height: 5' 6" (1.676 m)       Body mass index is 34.61 kg/m².    Constitution:   Well-developed, well nourished patient in no acute distress.  Neurological:   Alert and oriented x 3 and cooperative to examination.     Psychiatric/Behavior: Normal mental status.  Respiratory:   No shortness of breath.  Eyes:    Extraoccular muscles intact  Skin:    No scars, rash or suspicious lesions.    MSK:   Left wrist:  No open wounds or rashes.  Tenderness to palpation over the 1st dorsal compartment.  Finkelstein's test is positive.  Full range of motion of the wrist hand and digits.  There is no Tinel's over the superficial branch of the radial nerve.  No tenderness to palpation over the intersection of the 1st and 2nd dorsal compartments.  Sensation light touch intact in median ulnar radial distribution.  Radial pulse 2 +hand is warm well perfused    Imaging:   X-ray of the left hand shows no fractures or dislocations     Assessment:  Left de Quervain tenosynovitis    Plan:  We can start by treating this conservatively.  I will give her an injection today.  I went over the risks about getting an injection especially to darker pigmented patient's including skin atrophy and hypopigmentation.  She understands and agrees.  I also recommended a thumb spica brace.  She can follow up as needed    Follow Up:  As needed  Xray at next visit:  None      "

## 2023-01-23 ENCOUNTER — DOCUMENTATION ONLY (OUTPATIENT)
Dept: PRIMARY CARE CLINIC | Facility: CLINIC | Age: 58
End: 2023-01-23
Payer: MEDICARE

## 2023-02-03 ENCOUNTER — LAB VISIT (OUTPATIENT)
Dept: LAB | Facility: HOSPITAL | Age: 58
End: 2023-02-03
Attending: INTERNAL MEDICINE
Payer: MEDICARE

## 2023-02-03 DIAGNOSIS — Z11.59 SCREENING EXAMINATION FOR POLIOMYELITIS: ICD-10-CM

## 2023-02-03 DIAGNOSIS — E11.69 OBESITY, DIABETES, AND HYPERTENSION SYNDROME: Primary | ICD-10-CM

## 2023-02-03 DIAGNOSIS — E11.59 OBESITY, DIABETES, AND HYPERTENSION SYNDROME: Primary | ICD-10-CM

## 2023-02-03 DIAGNOSIS — E66.9 OBESITY, DIABETES, AND HYPERTENSION SYNDROME: Primary | ICD-10-CM

## 2023-02-03 DIAGNOSIS — E78.5 HYPERLIPIDEMIA, UNSPECIFIED HYPERLIPIDEMIA TYPE: ICD-10-CM

## 2023-02-03 DIAGNOSIS — I10 ESSENTIAL HYPERTENSION, MALIGNANT: ICD-10-CM

## 2023-02-03 DIAGNOSIS — I15.2 OBESITY, DIABETES, AND HYPERTENSION SYNDROME: Primary | ICD-10-CM

## 2023-02-03 DIAGNOSIS — E55.9 VITAMIN D DEFICIENCY DISEASE: ICD-10-CM

## 2023-02-03 LAB
ALBUMIN SERPL-MCNC: 4.2 G/DL (ref 3.5–5)
ALBUMIN/GLOB SERPL: 1.3 RATIO (ref 1.1–2)
ALP SERPL-CCNC: 81 UNIT/L (ref 40–150)
ALT SERPL-CCNC: 22 UNIT/L (ref 0–55)
AST SERPL-CCNC: 22 UNIT/L (ref 5–34)
BASOPHILS # BLD AUTO: 0.02 X10(3)/MCL (ref 0–0.2)
BASOPHILS NFR BLD AUTO: 0.3 %
BILIRUBIN DIRECT+TOT PNL SERPL-MCNC: 0.5 MG/DL
BUN SERPL-MCNC: 18.2 MG/DL (ref 9.8–20.1)
CALCIUM SERPL-MCNC: 10 MG/DL (ref 8.4–10.2)
CHLORIDE SERPL-SCNC: 101 MMOL/L (ref 98–107)
CHOLEST SERPL-MCNC: 170 MG/DL
CHOLEST/HDLC SERPL: 2 {RATIO} (ref 0–5)
CO2 SERPL-SCNC: 30 MMOL/L (ref 22–29)
CREAT SERPL-MCNC: 0.76 MG/DL (ref 0.55–1.02)
CREAT UR-MCNC: 81.4 MG/DL (ref 47–110)
DEPRECATED CALCIDIOL+CALCIFEROL SERPL-MC: 27.1 NG/ML (ref 30–80)
EOSINOPHIL # BLD AUTO: 0.08 X10(3)/MCL (ref 0–0.9)
EOSINOPHIL NFR BLD AUTO: 1.2 %
ERYTHROCYTE [DISTWIDTH] IN BLOOD BY AUTOMATED COUNT: 13.1 % (ref 11.5–17)
EST. AVERAGE GLUCOSE BLD GHB EST-MCNC: 237.4 MG/DL
GFR SERPLBLD CREATININE-BSD FMLA CKD-EPI: >60 MLS/MIN/1.73/M2
GLOBULIN SER-MCNC: 3.3 GM/DL (ref 2.4–3.5)
GLUCOSE SERPL-MCNC: 135 MG/DL (ref 74–100)
HBA1C MFR BLD: 9.9 %
HCT VFR BLD AUTO: 48.4 % (ref 37–47)
HDLC SERPL-MCNC: 73 MG/DL (ref 35–60)
HGB BLD-MCNC: 14.4 GM/DL (ref 12–16)
IMM GRANULOCYTES # BLD AUTO: 0 X10(3)/MCL (ref 0–0.04)
IMM GRANULOCYTES NFR BLD AUTO: 0 %
LDLC SERPL CALC-MCNC: 83 MG/DL (ref 50–140)
LYMPHOCYTES # BLD AUTO: 2.89 X10(3)/MCL (ref 0.6–4.6)
LYMPHOCYTES NFR BLD AUTO: 44.2 %
MCH RBC QN AUTO: 26.2 PG
MCHC RBC AUTO-ENTMCNC: 29.8 MG/DL (ref 33–36)
MCV RBC AUTO: 88.2 FL (ref 80–94)
MICROALBUMIN UR-MCNC: 30.1 UG/ML
MICROALBUMIN/CREAT RATIO PNL UR: 37 MG/GM CR (ref 0–30)
MONOCYTES # BLD AUTO: 0.47 X10(3)/MCL (ref 0.1–1.3)
MONOCYTES NFR BLD AUTO: 7.2 %
NEUTROPHILS # BLD AUTO: 3.08 X10(3)/MCL (ref 2.1–9.2)
NEUTROPHILS NFR BLD AUTO: 47.1 %
PLATELET # BLD AUTO: 259 X10(3)/MCL (ref 130–400)
PMV BLD AUTO: 10 FL (ref 7.4–10.4)
POTASSIUM SERPL-SCNC: 3.7 MMOL/L (ref 3.5–5.1)
PROT SERPL-MCNC: 7.5 GM/DL (ref 6.4–8.3)
RBC # BLD AUTO: 5.49 X10(6)/MCL (ref 4.2–5.4)
SODIUM SERPL-SCNC: 142 MMOL/L (ref 136–145)
T4 FREE SERPL-MCNC: 0.97 NG/DL (ref 0.7–1.48)
TRIGL SERPL-MCNC: 68 MG/DL (ref 37–140)
TSH SERPL-ACNC: 2.34 UIU/ML (ref 0.35–4.94)
VLDLC SERPL CALC-MCNC: 14 MG/DL
WBC # SPEC AUTO: 6.5 X10(3)/MCL (ref 4.5–11.5)

## 2023-02-03 PROCEDURE — 87522 HEPATITIS C REVRS TRNSCRPJ: CPT

## 2023-02-03 PROCEDURE — 82043 UR ALBUMIN QUANTITATIVE: CPT

## 2023-02-03 PROCEDURE — 80053 COMPREHEN METABOLIC PANEL: CPT

## 2023-02-03 PROCEDURE — 84439 ASSAY OF FREE THYROXINE: CPT

## 2023-02-03 PROCEDURE — 80061 LIPID PANEL: CPT

## 2023-02-03 PROCEDURE — 84443 ASSAY THYROID STIM HORMONE: CPT

## 2023-02-03 PROCEDURE — 83036 HEMOGLOBIN GLYCOSYLATED A1C: CPT

## 2023-02-03 PROCEDURE — 82306 VITAMIN D 25 HYDROXY: CPT

## 2023-02-03 PROCEDURE — 36415 COLL VENOUS BLD VENIPUNCTURE: CPT

## 2023-02-03 PROCEDURE — 85025 COMPLETE CBC W/AUTO DIFF WBC: CPT

## 2023-02-03 PROCEDURE — 30000890 MAYO GENERIC ORDERABLE

## 2023-02-04 LAB — MAYO GENERIC ORDERABLE RESULT: NORMAL

## 2023-03-13 ENCOUNTER — TELEPHONE (OUTPATIENT)
Dept: GYNECOLOGY | Facility: CLINIC | Age: 58
End: 2023-03-13

## 2023-03-13 DIAGNOSIS — R82.90 BAD ODOR OF URINE: Primary | ICD-10-CM

## 2023-03-13 NOTE — TELEPHONE ENCOUNTER
----- Message from Sosa Velasco MT sent at 3/9/2023  2:23 PM CST -----  Regarding: Problem  Patient called and stated that her urine has a smell and wants instructions on what to do

## 2023-03-14 ENCOUNTER — LAB VISIT (OUTPATIENT)
Dept: LAB | Facility: HOSPITAL | Age: 58
End: 2023-03-14
Attending: NURSE PRACTITIONER
Payer: MEDICARE

## 2023-03-14 DIAGNOSIS — R82.90 BAD ODOR OF URINE: ICD-10-CM

## 2023-03-14 LAB
APPEARANCE UR: CLEAR
BACTERIA #/AREA URNS AUTO: ABNORMAL /HPF
BILIRUB UR QL STRIP.AUTO: NEGATIVE MG/DL
COLOR UR AUTO: ABNORMAL
GLUCOSE UR QL STRIP.AUTO: ABNORMAL MG/DL
HYALINE CASTS #/AREA URNS LPF: ABNORMAL /LPF
KETONES UR QL STRIP.AUTO: NEGATIVE MG/DL
LEUKOCYTE ESTERASE UR QL STRIP.AUTO: 25 UNIT/L
MUCOUS THREADS URNS QL MICRO: ABNORMAL /LPF
NITRITE UR QL STRIP.AUTO: NEGATIVE
PH UR STRIP.AUTO: 5 [PH]
PROT UR QL STRIP.AUTO: NEGATIVE MG/DL
RBC #/AREA URNS AUTO: ABNORMAL /HPF
RBC UR QL AUTO: NEGATIVE UNIT/L
SP GR UR STRIP.AUTO: 1.03
SQUAMOUS #/AREA URNS LPF: ABNORMAL /HPF
UROBILINOGEN UR STRIP-ACNC: NORMAL MG/DL
WBC #/AREA URNS AUTO: ABNORMAL /HPF

## 2023-03-14 PROCEDURE — 87088 URINE BACTERIA CULTURE: CPT

## 2023-03-14 PROCEDURE — 81001 URINALYSIS AUTO W/SCOPE: CPT

## 2023-03-14 PROCEDURE — 87186 SC STD MICRODIL/AGAR DIL: CPT | Mod: 91

## 2023-03-14 NOTE — TELEPHONE ENCOUNTER
Informed pt of this information pt verbalized understanding and will report to the lab this afternoon if she has time

## 2023-03-15 ENCOUNTER — TELEPHONE (OUTPATIENT)
Dept: GYNECOLOGY | Facility: CLINIC | Age: 58
End: 2023-03-15
Payer: MEDICARE

## 2023-03-15 DIAGNOSIS — N39.0 URINARY TRACT INFECTION WITHOUT HEMATURIA, SITE UNSPECIFIED: Primary | ICD-10-CM

## 2023-03-15 RX ORDER — NITROFURANTOIN 25; 75 MG/1; MG/1
100 CAPSULE ORAL 2 TIMES DAILY
Qty: 14 CAPSULE | Refills: 0 | Status: SHIPPED | OUTPATIENT
Start: 2023-03-15 | End: 2023-03-22

## 2023-03-15 NOTE — TELEPHONE ENCOUNTER
Please inform pt of UTI. I have sent in a prescription. Please reiterate proper wiping technique and instruct her to contact us with any future symptoms.

## 2023-03-15 NOTE — TELEPHONE ENCOUNTER
Informed pt that she has a UTI and that her medication was sent to the pharmacy , she verbalized understanding.

## 2023-03-17 LAB
BACTERIA UR CULT: ABNORMAL
BACTERIA UR CULT: ABNORMAL

## 2023-03-22 DIAGNOSIS — I63.81 LACUNAR INFARCTION: Primary | ICD-10-CM

## 2023-03-30 ENCOUNTER — HOSPITAL ENCOUNTER (EMERGENCY)
Facility: HOSPITAL | Age: 58
Discharge: HOME | End: 2023-03-30
Attending: GENERAL PRACTICE
Payer: MEDICARE

## 2023-03-30 VITALS
OXYGEN SATURATION: 99 % | SYSTOLIC BLOOD PRESSURE: 146 MMHG | BODY MASS INDEX: 33.32 KG/M2 | HEART RATE: 65 BPM | HEIGHT: 65 IN | WEIGHT: 200 LBS | TEMPERATURE: 98 F | DIASTOLIC BLOOD PRESSURE: 78 MMHG | RESPIRATION RATE: 20 BRPM

## 2023-03-30 DIAGNOSIS — R05.9 COUGH, UNSPECIFIED TYPE: Primary | ICD-10-CM

## 2023-03-30 DIAGNOSIS — J32.9 CHRONIC SINUSITIS, UNSPECIFIED LOCATION: ICD-10-CM

## 2023-03-30 LAB
FLUAV AG UPPER RESP QL IA.RAPID: NOT DETECTED
FLUBV AG UPPER RESP QL IA.RAPID: NOT DETECTED
SARS-COV-2 RNA RESP QL NAA+PROBE: NOT DETECTED

## 2023-03-30 PROCEDURE — 0240U COVID/FLU A&B PCR: CPT | Performed by: GENERAL PRACTICE

## 2023-03-30 PROCEDURE — 99284 EMERGENCY DEPT VISIT MOD MDM: CPT

## 2023-03-30 RX ORDER — IPRATROPIUM BROMIDE 21 UG/1
2 SPRAY, METERED NASAL 2 TIMES DAILY
Qty: 30 ML | Refills: 0 | Status: SHIPPED | OUTPATIENT
Start: 2023-03-30 | End: 2023-05-08

## 2023-03-30 RX ORDER — BROMPHENIRAMINE MALEATE, PSEUDOEPHEDRINE HYDROCHLORIDE, AND DEXTROMETHORPHAN HYDROBROMIDE 2; 30; 10 MG/5ML; MG/5ML; MG/5ML
5 SYRUP ORAL
Qty: 250 ML | Refills: 0 | Status: SHIPPED | OUTPATIENT
Start: 2023-03-30 | End: 2023-04-09

## 2023-03-30 RX ORDER — AMOXICILLIN AND CLAVULANATE POTASSIUM 875; 125 MG/1; MG/1
1 TABLET, FILM COATED ORAL 2 TIMES DAILY
Qty: 20 TABLET | Refills: 0 | Status: SHIPPED | OUTPATIENT
Start: 2023-03-30 | End: 2023-05-08

## 2023-03-30 NOTE — ED PROVIDER NOTES
Encounter Date: 3/30/2023       History     Chief Complaint   Patient presents with    Cough     Patient c/o cough, unable to smell, body aches, and mucus draining into her chest x1 week     Pt is a 58 yo female with cough and congestion. Pt symptoms present for over  a week. Pt taking dayquil and nyquil. Pt now unable to taste, pt has ear pressure. Pt ha productive cough and fells like it is in her chest.     The history is provided by the patient. No  was used.   Review of patient's allergies indicates:   Allergen Reactions    Coenzyme q10     Pioglitazone     Buprenorphine Rash     Other reaction(s): burn     Past Medical History:   Diagnosis Date    Acute pulmonary embolus     De Quervain's tenosynovitis     Deep vein thrombosis     Diabetes mellitus, type 2     Diabetic neuropathy     Fibroid uterus     Hyperlipidemia     Hypertension     Lumbar back pain with radiculopathy affecting lower extremity     Lumbar spondylitis     Personal history of colonic polyps     Vitamin D deficiency      Past Surgical History:   Procedure Laterality Date    BACK SURGERY      CHOLECYSTECTOMY      Collarbone SX      COLONOSCOPY  2022    FRACTURE SURGERY  2009    Total Hip Replacenwnt    HIP FRACTURE SURGERY      JOINT REPLACEMENT      Hop Replacenent    Left Femur Sx      NECK SURGERY      REPAIR OF MENISCUS OF KNEE      SPINE SURGERY  2022    Back Surgery     Family History   Problem Relation Age of Onset    Cancer Mother     Coronary artery disease Mother     COPD Mother             Coronary artery disease Father     Diabetes Father     Heart disease Sister      Social History     Tobacco Use    Smoking status: Former    Smokeless tobacco: Never   Substance Use Topics    Alcohol use: Not Currently    Drug use: Never     Review of Systems   HENT:  Positive for congestion, ear pain, postnasal drip, rhinorrhea, sinus pressure and sinus pain.    Respiratory:  Positive for cough.     Musculoskeletal:  Positive for myalgias.   All other systems reviewed and are negative.    Physical Exam     Initial Vitals [03/30/23 0629]   BP Pulse Resp Temp SpO2   (!) 146/78 65 20 97.7 °F (36.5 °C) 99 %      MAP       --         Physical Exam    Nursing note and vitals reviewed.  Constitutional: She appears well-developed and well-nourished.   HENT:   Swollen erythematous mucosa bilaterally    Eyes: Pupils are equal, round, and reactive to light.   Neck: Neck supple.   Normal range of motion.  Cardiovascular:  Normal rate, regular rhythm, normal heart sounds and intact distal pulses.           Pulmonary/Chest: Breath sounds normal.   Abdominal: Abdomen is soft. Bowel sounds are normal.   Musculoskeletal:         General: Normal range of motion.      Cervical back: Normal range of motion and neck supple.     Neurological: She is alert and oriented to person, place, and time. She has normal strength. GCS score is 15. GCS eye subscore is 4. GCS verbal subscore is 5. GCS motor subscore is 6.   Skin: Capillary refill takes less than 2 seconds.   Psychiatric: She has a normal mood and affect.       ED Course   Procedures  Labs Reviewed   COVID/FLU A&B PCR - Normal    Narrative:     The Xpert Xpress SARS-CoV-2/FLU/RSV plus is a rapid, multiplexed real-time PCR test intended for the simultaneous qualitative detection and differentiation of SARS-CoV-2, Influenza A, Influenza B, and respiratory syncytial virus (RSV) viral RNA in either nasopharyngeal swab or nasal swab specimens.                Imaging Results    None          Medications - No data to display  Medical Decision Making:   Initial Assessment:   Pt with sinus symptoms   Differential Diagnosis:   Chronic sinusitis   Uri   Covid 19  Flu   ED Management:  R/o covid 19                        Clinical Impression:   Final diagnoses:  [R05.9] Cough, unspecified type (Primary)  [J32.9] Chronic sinusitis, unspecified location               Efraín Sow,  MD  03/30/23 0734

## 2023-04-03 ENCOUNTER — OFFICE VISIT (OUTPATIENT)
Dept: NEUROLOGY | Facility: CLINIC | Age: 58
End: 2023-04-03
Payer: MEDICARE

## 2023-04-03 VITALS
SYSTOLIC BLOOD PRESSURE: 114 MMHG | HEIGHT: 65 IN | WEIGHT: 200 LBS | DIASTOLIC BLOOD PRESSURE: 68 MMHG | BODY MASS INDEX: 33.32 KG/M2

## 2023-04-03 DIAGNOSIS — I63.89 OTHER CEREBRAL INFARCTION: Primary | ICD-10-CM

## 2023-04-03 DIAGNOSIS — I63.9 CEREBROVASCULAR ACCIDENT (CVA), UNSPECIFIED MECHANISM: Primary | ICD-10-CM

## 2023-04-03 PROCEDURE — 99215 OFFICE O/P EST HI 40 MIN: CPT | Mod: S$PBB,,, | Performed by: PSYCHIATRY & NEUROLOGY

## 2023-04-03 PROCEDURE — 99213 OFFICE O/P EST LOW 20 MIN: CPT | Mod: PBBFAC | Performed by: PSYCHIATRY & NEUROLOGY

## 2023-04-03 PROCEDURE — 99999 PR PBB SHADOW E&M-EST. PATIENT-LVL III: CPT | Mod: PBBFAC,,, | Performed by: PSYCHIATRY & NEUROLOGY

## 2023-04-03 PROCEDURE — 99215 PR OFFICE/OUTPT VISIT, EST, LEVL V, 40-54 MIN: ICD-10-PCS | Mod: S$PBB,,, | Performed by: PSYCHIATRY & NEUROLOGY

## 2023-04-03 PROCEDURE — 99999 PR PBB SHADOW E&M-EST. PATIENT-LVL III: ICD-10-PCS | Mod: PBBFAC,,, | Performed by: PSYCHIATRY & NEUROLOGY

## 2023-04-03 RX ORDER — ASPIRIN 325 MG
50000 TABLET, DELAYED RELEASE (ENTERIC COATED) ORAL
COMMUNITY
Start: 2023-02-16 | End: 2024-03-21

## 2023-04-03 RX ORDER — GLIMEPIRIDE 4 MG/1
4 TABLET ORAL
COMMUNITY
Start: 2023-03-07 | End: 2024-03-21

## 2023-04-03 NOTE — PROGRESS NOTES
Neurology Office Visit  Neurology    Heather Arteaga is a 57 y.o. female for stroke eval.  Incidental finding of T2 hyperintensities in roger when undergoing C-spine MRI.  Does not recall any stroke-like symptoms.  Taking ASA 81mg daily.    ROS:  Review of Systems   All other systems reviewed and are negative.     Past Medical History:   Diagnosis Date    Acute pulmonary embolus     De Quervain's tenosynovitis     Deep vein thrombosis     Diabetes mellitus, type 2     Diabetic neuropathy     Fibroid uterus     Hyperlipidemia     Hypertension     Lumbar back pain with radiculopathy affecting lower extremity     Lumbar spondylitis     Personal history of colonic polyps     Vitamin D deficiency      Past Surgical History:   Procedure Laterality Date    BACK SURGERY      CHOLECYSTECTOMY  2002    Collarbone SX      COLONOSCOPY  2022    FRACTURE SURGERY  2009    Total Hip Replacenwnt    HIP FRACTURE SURGERY      JOINT REPLACEMENT      Hop Replacenent    Left Femur Sx      NECK SURGERY      REPAIR OF MENISCUS OF KNEE      SPINE SURGERY  2022    Back Surgery     Family History   Problem Relation Age of Onset    Cancer Mother     Coronary artery disease Mother     COPD Mother             Coronary artery disease Father     Diabetes Father     Heart disease Sister      Review of patient's allergies indicates:   Allergen Reactions    Coenzyme q10     Pioglitazone     Buprenorphine Rash     Other reaction(s): burn    Metformin Diarrhea       Current Outpatient Medications:     amoxicillin-clavulanate 875-125mg (AUGMENTIN) 875-125 mg per tablet, Take 1 tablet by mouth 2 (two) times daily., Disp: 20 tablet, Rfl: 0    aspirin 81 mg Cap, Take 81 mg by mouth once daily., Disp: , Rfl:     brompheniramine-pseudoeph-DM (BROMFED DM) 2-30-10 mg/5 mL Syrp, Take 5 mLs by mouth every 4 to 6 hours as needed (cough and congestion)., Disp: 250 mL, Rfl: 0    chlorthalidone (HYGROTEN) 25 MG Tab, Take 25 mg by mouth once  daily., Disp: , Rfl:     cholecalciferol, vitamin D3, 1,250 mcg (50,000 unit) capsule, Take 50,000 Units by mouth every 7 days., Disp: , Rfl:     empagliflozin (JARDIANCE) 25 mg tablet, Take 25 mg by mouth once daily., Disp: , Rfl:     ezetimibe (ZETIA) 10 mg tablet, Take 10 mg by mouth every morning., Disp: , Rfl:     gabapentin (NEURONTIN) 600 MG tablet, Take 1 tablet by mouth 3 (three) times daily., Disp: , Rfl:     glimepiride (AMARYL) 4 MG tablet, Take 4 mg by mouth., Disp: , Rfl:     HYDROcodone-acetaminophen (NORCO)  mg per tablet, Take 1 tablet by mouth every 4 (four) hours as needed., Disp: , Rfl:     ipratropium (ATROVENT) 21 mcg (0.03 %) nasal spray, 2 sprays by Each Nostril route 2 (two) times daily., Disp: 30 mL, Rfl: 0    levothyroxine (SYNTHROID) 50 MCG tablet, Take 1 tablet (50 mcg total) by mouth before breakfast., Disp: 30 tablet, Rfl: 11    lisinopril (PRINIVIL,ZESTRIL) 40 MG tablet, Take 40 mg by mouth once daily., Disp: , Rfl:     MOVANTIK 25 mg tablet, Take 25 mg by mouth once daily., Disp: , Rfl:     naloxone (NARCAN) 4 mg/actuation [place], SMARTSI Spray(s) Both Nares, Disp: , Rfl:     REPATHA PUSHTRONEX 420 mg/3.5 mL Injt, inject 3.5 mls sub-q once per month, Disp: 3.5 mL, Rfl: 5    tizanidine (ZANAFLEX) 4 MG tablet, Take 4 mg by mouth 3 (three) times daily as needed., Disp: , Rfl:     TRULICITY 1.5 mg/0.5 mL pen injector, Inject 0.5 mg into the skin once a week., Disp: , Rfl:   Outpatient Medications Marked as Taking for the 4/3/23 encounter (Office Visit) with Refugio Lou MD   Medication Sig Dispense Refill    amoxicillin-clavulanate 875-125mg (AUGMENTIN) 875-125 mg per tablet Take 1 tablet by mouth 2 (two) times daily. 20 tablet 0    aspirin 81 mg Cap Take 81 mg by mouth once daily.      brompheniramine-pseudoeph-DM (BROMFED DM) 2-30-10 mg/5 mL Syrp Take 5 mLs by mouth every 4 to 6 hours as needed (cough and congestion). 250 mL 0    chlorthalidone (HYGROTEN) 25 MG Tab Take 25  mg by mouth once daily.      cholecalciferol, vitamin D3, 1,250 mcg (50,000 unit) capsule Take 50,000 Units by mouth every 7 days.      empagliflozin (JARDIANCE) 25 mg tablet Take 25 mg by mouth once daily.      ezetimibe (ZETIA) 10 mg tablet Take 10 mg by mouth every morning.      gabapentin (NEURONTIN) 600 MG tablet Take 1 tablet by mouth 3 (three) times daily.      glimepiride (AMARYL) 4 MG tablet Take 4 mg by mouth.      HYDROcodone-acetaminophen (NORCO)  mg per tablet Take 1 tablet by mouth every 4 (four) hours as needed.      ipratropium (ATROVENT) 21 mcg (0.03 %) nasal spray 2 sprays by Each Nostril route 2 (two) times daily. 30 mL 0    levothyroxine (SYNTHROID) 50 MCG tablet Take 1 tablet (50 mcg total) by mouth before breakfast. 30 tablet 11    lisinopril (PRINIVIL,ZESTRIL) 40 MG tablet Take 40 mg by mouth once daily.      MOVANTIK 25 mg tablet Take 25 mg by mouth once daily.      naloxone (NARCAN) 4 mg/actuation Spry SMARTSI Spray(s) Both Nares      REPATHA PUSHTRONEX 420 mg/3.5 mL Injt inject 3.5 mls sub-q once per month 3.5 mL 5    tizanidine (ZANAFLEX) 4 MG tablet Take 4 mg by mouth 3 (three) times daily as needed.      TRULICITY 1.5 mg/0.5 mL pen injector Inject 0.5 mg into the skin once a week.       Social History     Tobacco Use    Smoking status: Former     Types: Cigarettes    Smokeless tobacco: Never   Substance Use Topics    Alcohol use: Not Currently    Drug use: Never         Vitals:    23 0903   BP: 114/68     Gen NAD  HEENT NC/AT  CV RRR, no carotid bruits  Resp clear  GI soft  Ext no C/C/E  Neuro  AAOx4  Speech fluent, appropriate  EOMI, PERRLA, VFF, no papilledema  Normal facial strength, sensation  Tongue and palate midline  Motor 5/5  Sensation intact  DTRs symmetric  Coord intact  Gait Normal    Assessment: Stroke    Plan: Order MRI brain w/ and w/o, MRA head w/o, MRA neck w/

## 2023-04-17 ENCOUNTER — OFFICE VISIT (OUTPATIENT)
Dept: ORTHOPEDICS | Facility: CLINIC | Age: 58
End: 2023-04-17
Payer: MEDICARE

## 2023-04-17 DIAGNOSIS — M65.4 RADIAL STYLOID TENOSYNOVITIS OF LEFT HAND: Primary | ICD-10-CM

## 2023-04-17 PROCEDURE — 99213 OFFICE O/P EST LOW 20 MIN: CPT | Mod: ,,, | Performed by: STUDENT IN AN ORGANIZED HEALTH CARE EDUCATION/TRAINING PROGRAM

## 2023-04-17 PROCEDURE — 99213 PR OFFICE/OUTPT VISIT, EST, LEVL III, 20-29 MIN: ICD-10-PCS | Mod: ,,, | Performed by: STUDENT IN AN ORGANIZED HEALTH CARE EDUCATION/TRAINING PROGRAM

## 2023-04-17 RX ORDER — SEMAGLUTIDE 2.68 MG/ML
2 INJECTION, SOLUTION SUBCUTANEOUS
COMMUNITY
Start: 2023-04-05

## 2023-04-17 NOTE — PROGRESS NOTES
Chief Complaint:  Left wrist pain    Consulting Physician: No ref. provider found    History of present illness:    Patient is a 57-year-old female who presents for follow-up evaluation of her left wrist pain.  Saw her in my clinic last where I gave her an injection into the 1st dorsal compartment for de Quervain tenosynovitis.  This helped some with the pain although it returned.  She also noticed a mass that has been growing over the radial aspect of the wrist at the source of her pain.  Recently she is also noticed numbness and tingling in the thumb index and middle finger.  This is worse at nighttime.  She does not complain of any weakness or clumsiness.  This affects mostly her left hand.  She is not had an injection into the carpal tunnel.  She is never had a carpal tunnel operation.  This has been going on for about 3 months.  Past Medical History:   Diagnosis Date    Acute pulmonary embolus     De Quervain's tenosynovitis     Deep vein thrombosis     Diabetes mellitus, type 2     Diabetic neuropathy     Fibroid uterus     Hyperlipidemia     Hypertension     Lumbar back pain with radiculopathy affecting lower extremity     Lumbar spondylitis     Personal history of colonic polyps     Vitamin D deficiency        Past Surgical History:   Procedure Laterality Date    BACK SURGERY      CHOLECYSTECTOMY  2002    Collarbone SX      COLONOSCOPY  04/11/2022    FRACTURE SURGERY  05/1/2009    Total Hip Replacenwnt    HIP FRACTURE SURGERY      JOINT REPLACEMENT  2009    Hop Replacenent    Left Femur Sx      NECK SURGERY      REPAIR OF MENISCUS OF KNEE      SPINE SURGERY  5/13/2022    Back Surgery       Current Outpatient Medications   Medication Sig    amoxicillin-clavulanate 875-125mg (AUGMENTIN) 875-125 mg per tablet Take 1 tablet by mouth 2 (two) times daily.    aspirin 81 mg Cap Take 81 mg by mouth once daily.    chlorthalidone (HYGROTEN) 25 MG Tab Take 25 mg by mouth once daily.    cholecalciferol, vitamin D3, 1,250  mcg (50,000 unit) capsule Take 50,000 Units by mouth every 7 days.    empagliflozin (JARDIANCE) 25 mg tablet Take 25 mg by mouth once daily.    ezetimibe (ZETIA) 10 mg tablet Take 10 mg by mouth every morning.    gabapentin (NEURONTIN) 600 MG tablet Take 1 tablet by mouth 3 (three) times daily.    glimepiride (AMARYL) 4 MG tablet Take 4 mg by mouth.    HYDROcodone-acetaminophen (NORCO)  mg per tablet Take 1 tablet by mouth every 4 (four) hours as needed.    ipratropium (ATROVENT) 21 mcg (0.03 %) nasal spray 2 sprays by Each Nostril route 2 (two) times daily.    levothyroxine (SYNTHROID) 50 MCG tablet Take 1 tablet (50 mcg total) by mouth before breakfast.    lisinopril (PRINIVIL,ZESTRIL) 40 MG tablet Take 40 mg by mouth once daily.    MOVANTIK 25 mg tablet Take 25 mg by mouth once daily.    naloxone (NARCAN) 4 mg/actuation Spry SMARTSI Spray(s) Both Nares    REPATHA PUSHTRONEX 420 mg/3.5 mL Injt inject 3.5 mls sub-q once per month    tizanidine (ZANAFLEX) 4 MG tablet Take 4 mg by mouth 3 (three) times daily as needed.    TRULICITY 1.5 mg/0.5 mL pen injector Inject 0.5 mg into the skin once a week.    OZEMPIC 2 mg/dose (8 mg/3 mL) PnIj Inject 2 mg into the skin every 7 days.     No current facility-administered medications for this visit.       Review of patient's allergies indicates:   Allergen Reactions    Coenzyme q10     Pioglitazone     Buprenorphine Rash     Other reaction(s): burn    Metformin Diarrhea       Family History   Problem Relation Age of Onset    Cancer Mother     Coronary artery disease Mother     COPD Mother             Coronary artery disease Father     Diabetes Father     Heart disease Sister        Social History     Socioeconomic History    Marital status: Single   Tobacco Use    Smoking status: Former     Types: Cigarettes    Smokeless tobacco: Never   Substance and Sexual Activity    Alcohol use: Not Currently    Drug use: Never    Sexual activity: Not Currently       Review  of Systems:    Constitution:   Denies chills, fever, and sweats.  HENT:   Denies headaches or blurry vision.  Cardiovascular:  Denies chest pain or irregular heart beat.  Respiratory:   Denies cough or shortness of breath.  Gastrointestinal:  Denies abdominal pain, nausea, or vomiting.  Musculoskeletal:   Denies muscle cramps.  Neurological:   Denies dizziness or focal weakness.  Psychiatric/Behavior: Normal mental status.  Hematology/Lymph:  Denies bleeding problem or easy bruising/bleeding.  Skin:    Denies rash or suspicious lesions.    Examination:    Vital Signs:    Vitals:    04/17/23 1432   PainSc:   5       There is no height or weight on file to calculate BMI.    Constitution:   Well-developed, well nourished patient in no acute distress.  Neurological:   Alert and oriented x 3 and cooperative to examination.     Psychiatric/Behavior: Normal mental status.  Respiratory:   No shortness of breath.  Eyes:    Extraoccular muscles intact  Skin:    No scars, rash or suspicious lesions.    MSK:   Left wrist:  No open wounds or rashes.  Tenderness to palpation over the 1st dorsal compartment.  Finkelstein's test is positive.  Full range of motion of the wrist hand and digits.  There is no Tinel's over the superficial branch of the radial nerve.  No tenderness to palpation over the intersection of the 1st and 2nd dorsal compartments.  Tinel is positive over the median nerve at the wrist.  Two-point discrimination is 10 mm in the thumb index and middle fingers.  It is 5 mm in the ring and small fingers.  Phalen's and Durkan's are positive.  Apb strength is 5/5.  There is no Tinel over the median nerve proximally in the forearm.  No Tinel over the ulnar nerve over the medial epicondyle.  Flexion and compression of the elbow is negative.  Radial pulse 2 +hand is warm well perfused    Imaging:   New imaging     Assessment:  Left de Quervain tenosynovitis with cyst, left carpal tunnel syndrome    Plan:  I will send her  to therapy for the left de Quervain tenosynovitis.  They can work on phono and iontophoresis for this as well as stretching.  For left carpal tunnel syndrome I will get a EMG nerve conduction study.  I recommended that she wear her wrist brace at nighttime to keep her wrists in a neutral position.  Hopefully this will resolve with bracing.  I will see her back after the EMG to see how she is doing with therapy for her left de Quervain tenosynovitis as well as her carpal tunnel syndrome    Follow Up:  After EMG  Xray at next visit:  None

## 2023-05-03 ENCOUNTER — OFFICE VISIT (OUTPATIENT)
Dept: ORTHOPEDICS | Facility: CLINIC | Age: 58
End: 2023-05-03
Payer: MEDICARE

## 2023-05-03 ENCOUNTER — HOSPITAL ENCOUNTER (OUTPATIENT)
Dept: RADIOLOGY | Facility: HOSPITAL | Age: 58
Discharge: HOME | End: 2023-05-03
Attending: PSYCHIATRY & NEUROLOGY
Payer: MEDICARE

## 2023-05-03 DIAGNOSIS — I63.89 OTHER CEREBRAL INFARCTION: ICD-10-CM

## 2023-05-03 DIAGNOSIS — M65.4 RADIAL STYLOID TENOSYNOVITIS OF LEFT HAND: Primary | ICD-10-CM

## 2023-05-03 DIAGNOSIS — G56.03 BILATERAL CARPAL TUNNEL SYNDROME: ICD-10-CM

## 2023-05-03 PROCEDURE — A9577 INJ MULTIHANCE: HCPCS | Performed by: PSYCHIATRY & NEUROLOGY

## 2023-05-03 PROCEDURE — 99213 PR OFFICE/OUTPT VISIT, EST, LEVL III, 20-29 MIN: ICD-10-PCS | Mod: 25,,, | Performed by: STUDENT IN AN ORGANIZED HEALTH CARE EDUCATION/TRAINING PROGRAM

## 2023-05-03 PROCEDURE — 70548 MR ANGIOGRAPHY NECK W/DYE: CPT | Mod: TC

## 2023-05-03 PROCEDURE — 20526 PR INJECT CARPAL TUNNEL: ICD-10-PCS | Mod: 50,,, | Performed by: STUDENT IN AN ORGANIZED HEALTH CARE EDUCATION/TRAINING PROGRAM

## 2023-05-03 PROCEDURE — 70544 MR ANGIOGRAPHY HEAD W/O DYE: CPT | Mod: TC

## 2023-05-03 PROCEDURE — 25500020 PHARM REV CODE 255: Performed by: PSYCHIATRY & NEUROLOGY

## 2023-05-03 PROCEDURE — 20526 THER INJECTION CARP TUNNEL: CPT | Mod: 50,,, | Performed by: STUDENT IN AN ORGANIZED HEALTH CARE EDUCATION/TRAINING PROGRAM

## 2023-05-03 PROCEDURE — 70553 MRI BRAIN STEM W/O & W/DYE: CPT | Mod: TC

## 2023-05-03 PROCEDURE — 99213 OFFICE O/P EST LOW 20 MIN: CPT | Mod: 25,,, | Performed by: STUDENT IN AN ORGANIZED HEALTH CARE EDUCATION/TRAINING PROGRAM

## 2023-05-03 RX ADMIN — BETAMETHASONE SODIUM PHOSPHATE AND BETAMETHASONE ACETATE 6 MG: 3; 3 INJECTION, SUSPENSION INTRA-ARTICULAR; INTRALESIONAL; INTRAMUSCULAR; SOFT TISSUE at 02:05

## 2023-05-03 RX ADMIN — GADOBENATE DIMEGLUMINE 20 ML: 529 INJECTION, SOLUTION INTRAVENOUS at 09:05

## 2023-05-03 RX ADMIN — LIDOCAINE HYDROCHLORIDE 1 ML: 10 INJECTION INFILTRATION; PERINEURAL at 02:05

## 2023-05-04 RX ORDER — LIDOCAINE HYDROCHLORIDE 10 MG/ML
1 INJECTION INFILTRATION; PERINEURAL
Status: DISCONTINUED | OUTPATIENT
Start: 2023-05-03 | End: 2023-05-04 | Stop reason: HOSPADM

## 2023-05-04 RX ORDER — BETAMETHASONE SODIUM PHOSPHATE AND BETAMETHASONE ACETATE 3; 3 MG/ML; MG/ML
6 INJECTION, SUSPENSION INTRA-ARTICULAR; INTRALESIONAL; INTRAMUSCULAR; SOFT TISSUE
Status: DISCONTINUED | OUTPATIENT
Start: 2023-05-03 | End: 2023-05-04 | Stop reason: HOSPADM

## 2023-05-04 NOTE — PROCEDURES
Carpal Tunnel    Date/Time: 5/3/2023 2:00 PM  Performed by: Mio Salomon MD  Authorized by: Mio Salomon MD     Consent Done?:  Yes (Verbal)  Indications:  Pain and diagnostic evaluation  Timeout: prior to procedure the correct patient, procedure, and site was verified    Location:  Wrist  Ultrasonic Guidance for Needle Placement?: No    Needle size:  25 G  Approach:  Volar  Medications:  1 mL LIDOcaine HCL 10 mg/ml (1%) 10 mg/mL (1 %); 6 mg betamethasone acetate-betamethasone sodium phosphate 6 mg/mL  Patient tolerance:  Patient tolerated the procedure well with no immediate complications  Carpal Tunnel    Date/Time: 5/3/2023 2:00 PM  Performed by: Mio Salomon MD  Authorized by: Mio Salomon MD     Consent Done?:  Yes (Verbal)  Indications:  Pain and diagnostic evaluation  Timeout: prior to procedure the correct patient, procedure, and site was verified    Location:  Wrist  Ultrasonic Guidance for Needle Placement?: No    Needle size:  25 G  Approach:  Volar  Medications:  1 mL LIDOcaine HCL 10 mg/ml (1%) 10 mg/mL (1 %); 6 mg betamethasone acetate-betamethasone sodium phosphate 6 mg/mL  Patient tolerance:  Patient tolerated the procedure well with no immediate complications

## 2023-05-04 NOTE — PROGRESS NOTES
Chief Complaint:  Left wrist pain    Consulting Physician: No ref. provider found    History of present illness:    Patient is a 57-year-old female who presents for follow-up evaluation of her left de Quervain tenosynovitis and left carpal tunnel syndrome.  I saw her in my clinic previously where I gave her nighttime splints for the carpal tunnel syndrome and I sent her to therapy for the de Quervain tenosynovitis.  I also gave her an injection into the 1st dorsal compartment previously.  She states that the de Quervain tenosynovitis is improving.  She still has some numbness in her fingertips at nighttime although the bracing is helping her.  I sent her for EMG and she is here for the results which show that she has bilateral carpal tunnel syndrome.  Overall this numbness and pain is improving and she is interested in an injection.  She does not want surgery at the moment.  She reports having similar symptoms in the right hand although they are less severe.  The symptoms in both hands affect her most at nighttime and she often wakes up from her sleep from the symptoms.      Past Medical History:   Diagnosis Date    Acute pulmonary embolus     De Quervain's tenosynovitis     Deep vein thrombosis     Diabetes mellitus, type 2     Diabetic neuropathy     Fibroid uterus     Hyperlipidemia     Hypertension     Lumbar back pain with radiculopathy affecting lower extremity     Lumbar spondylitis     Personal history of colonic polyps     Vitamin D deficiency        Past Surgical History:   Procedure Laterality Date    BACK SURGERY      CHOLECYSTECTOMY  2002    Collarbone SX      COLONOSCOPY  04/11/2022    FRACTURE SURGERY  05/1/2009    Total Hip Replacenwnt    HIP FRACTURE SURGERY      JOINT REPLACEMENT  2009    Hop Replacenent    Left Femur Sx      NECK SURGERY      REPAIR OF MENISCUS OF KNEE      SPINE SURGERY  5/13/2022    Back Surgery       Current Outpatient Medications   Medication Sig    amoxicillin-clavulanate  875-125mg (AUGMENTIN) 875-125 mg per tablet Take 1 tablet by mouth 2 (two) times daily.    aspirin 81 mg Cap Take 81 mg by mouth once daily.    chlorthalidone (HYGROTEN) 25 MG Tab Take 25 mg by mouth once daily.    cholecalciferol, vitamin D3, 1,250 mcg (50,000 unit) capsule Take 50,000 Units by mouth every 7 days.    empagliflozin (JARDIANCE) 25 mg tablet Take 25 mg by mouth once daily.    ezetimibe (ZETIA) 10 mg tablet Take 10 mg by mouth every morning.    gabapentin (NEURONTIN) 600 MG tablet Take 1 tablet by mouth 3 (three) times daily.    glimepiride (AMARYL) 4 MG tablet Take 4 mg by mouth.    HYDROcodone-acetaminophen (NORCO)  mg per tablet Take 1 tablet by mouth every 4 (four) hours as needed.    ipratropium (ATROVENT) 21 mcg (0.03 %) nasal spray 2 sprays by Each Nostril route 2 (two) times daily.    levothyroxine (SYNTHROID) 50 MCG tablet Take 1 tablet (50 mcg total) by mouth before breakfast.    lisinopril (PRINIVIL,ZESTRIL) 40 MG tablet Take 40 mg by mouth once daily.    MOVANTIK 25 mg tablet Take 25 mg by mouth once daily.    naloxone (NARCAN) 4 mg/actuation Spry SMARTSI Spray(s) Both Nares    OZEMPIC 2 mg/dose (8 mg/3 mL) PnIj Inject 2 mg into the skin every 7 days.    REPATHA PUSHTRONEX 420 mg/3.5 mL Injt inject 3.5 mls sub-q once per month    tizanidine (ZANAFLEX) 4 MG tablet Take 4 mg by mouth 3 (three) times daily as needed.    TRULICITY 1.5 mg/0.5 mL pen injector Inject 0.5 mg into the skin once a week.     No current facility-administered medications for this visit.       Review of patient's allergies indicates:   Allergen Reactions    Coenzyme q10     Pioglitazone     Buprenorphine Rash     Other reaction(s): burn    Metformin Diarrhea       Family History   Problem Relation Age of Onset    Cancer Mother     Coronary artery disease Mother     COPD Mother             Coronary artery disease Father     Diabetes Father     Heart disease Sister        Social History     Socioeconomic  History    Marital status: Single   Tobacco Use    Smoking status: Former     Types: Cigarettes    Smokeless tobacco: Never   Substance and Sexual Activity    Alcohol use: Not Currently    Drug use: Never    Sexual activity: Not Currently       Review of Systems:    Constitution:   Denies chills, fever, and sweats.  HENT:   Denies headaches or blurry vision.  Cardiovascular:  Denies chest pain or irregular heart beat.  Respiratory:   Denies cough or shortness of breath.  Gastrointestinal:  Denies abdominal pain, nausea, or vomiting.  Musculoskeletal:   Denies muscle cramps.  Neurological:   Denies dizziness or focal weakness.  Psychiatric/Behavior: Normal mental status.  Hematology/Lymph:  Denies bleeding problem or easy bruising/bleeding.  Skin:    Denies rash or suspicious lesions.    Examination:    Vital Signs:    There were no vitals filed for this visit.      There is no height or weight on file to calculate BMI.    Constitution:   Well-developed, well nourished patient in no acute distress.  Neurological:   Alert and oriented x 3 and cooperative to examination.     Psychiatric/Behavior: Normal mental status.  Respiratory:   No shortness of breath.  Eyes:    Extraoccular muscles intact  Skin:    No scars, rash or suspicious lesions.    MSK:   Left wrist:  No open wounds or rashes.  Tenderness to palpation over the 1st dorsal compartment.  Finkelstein's test is positive.  Full range of motion of the wrist hand and digits.  There is no Tinel's over the superficial branch of the radial nerve.  No tenderness to palpation over the intersection of the 1st and 2nd dorsal compartments.  Tinel is positive over the median nerve at the wrist.  Two-point discrimination is 10 mm in the thumb index and middle fingers.  It is 5 mm in the ring and small fingers.  Phalen's and Durkan's are positive.  Apb strength is 5/5.  There is no Tinel over the median nerve proximally in the forearm.  No Tinel over the ulnar nerve over the  medial epicondyle.  Flexion and compression of the elbow is negative.  Radial pulse 2 +hand is warm well perfused    Left wrist:  No open wounds or rashes.  Full range of motion of the wrist hand and digits.  There is no Tinel's over the superficial branch of the radial nerve.  No tenderness to palpation over the intersection of the 1st and 2nd dorsal compartments.  Tinel is positive over the median nerve at the wrist.  Two-point discrimination is 10 mm in the thumb index and middle fingers.  It is 5 mm in the ring and small fingers.  Phalen's and Durkan's are positive.  Apb strength is 5/5.  There is no Tinel over the median nerve proximally in the forearm.  No Tinel over the ulnar nerve over the medial epicondyle.  Flexion and compression of the elbow is negative.  Radial pulse 2 +hand is warm well perfused    Imaging:   New imaging     Assessment:  Left de Quervain tenosynovitis with cyst, bilateral carpal tunnel syndrome    Plan:  I went over different treatment options for her bilateral carpal tunnel syndrome.  I explained that she could be a candidate for surgical release however she is not interested in surgery at this time.  She would like injections into her bilateral carpal tunnels.  I can give her injections into both carpal tunnels today.  She can continue nighttime bracing bilaterally.  I can see her back in 3 months to see how    Follow Up:  3 months  Xray at next visit:  None

## 2023-05-08 ENCOUNTER — OFFICE VISIT (OUTPATIENT)
Dept: NEUROLOGY | Facility: CLINIC | Age: 58
End: 2023-05-08
Payer: MEDICARE

## 2023-05-08 VITALS
WEIGHT: 200 LBS | HEIGHT: 65 IN | BODY MASS INDEX: 33.32 KG/M2 | SYSTOLIC BLOOD PRESSURE: 118 MMHG | DIASTOLIC BLOOD PRESSURE: 62 MMHG

## 2023-05-08 DIAGNOSIS — I63.9 CEREBROVASCULAR ACCIDENT (CVA), UNSPECIFIED MECHANISM: Primary | ICD-10-CM

## 2023-05-08 PROCEDURE — 99999 PR PBB SHADOW E&M-EST. PATIENT-LVL III: ICD-10-PCS | Mod: PBBFAC,,, | Performed by: PSYCHIATRY & NEUROLOGY

## 2023-05-08 PROCEDURE — 99999 PR PBB SHADOW E&M-EST. PATIENT-LVL III: CPT | Mod: PBBFAC,,, | Performed by: PSYCHIATRY & NEUROLOGY

## 2023-05-08 PROCEDURE — 99213 PR OFFICE/OUTPT VISIT, EST, LEVL III, 20-29 MIN: ICD-10-PCS | Mod: S$PBB,,, | Performed by: PSYCHIATRY & NEUROLOGY

## 2023-05-08 PROCEDURE — 99213 OFFICE O/P EST LOW 20 MIN: CPT | Mod: PBBFAC | Performed by: PSYCHIATRY & NEUROLOGY

## 2023-05-08 PROCEDURE — 99213 OFFICE O/P EST LOW 20 MIN: CPT | Mod: S$PBB,,, | Performed by: PSYCHIATRY & NEUROLOGY

## 2023-05-08 RX ORDER — LISINOPRIL 2.5 MG/1
2.5 TABLET ORAL
COMMUNITY
Start: 2023-04-17

## 2023-05-08 NOTE — PROGRESS NOTES
Neurology Office Visit  Neurology    Heather Arteaga is a 57 y.o. female for f/u.  Doing well.  No stroke concerns.  MRI brain stable.  MRA WNL.  Tolerating meds.    ROS:  Review of Systems   All other systems reviewed and are negative.     Past Medical History:   Diagnosis Date    Acute pulmonary embolus     De Quervain's tenosynovitis     Deep vein thrombosis     Diabetes mellitus, type 2     Diabetic neuropathy     Fibroid uterus     Hyperlipidemia     Hypertension     Lumbar back pain with radiculopathy affecting lower extremity     Lumbar spondylitis     Personal history of colonic polyps     Stroke     Vitamin D deficiency      Past Surgical History:   Procedure Laterality Date    BACK SURGERY      CHOLECYSTECTOMY  2002    Collarbone SX      COLONOSCOPY  2022    FRACTURE SURGERY  2009    Total Hip Replacenwnt    HIP FRACTURE SURGERY      JOINT REPLACEMENT      Hop Replacenent    Left Femur Sx      NECK SURGERY      REPAIR OF MENISCUS OF KNEE      SPINE SURGERY  2022    Back Surgery     Family History   Problem Relation Age of Onset    Cancer Mother     Coronary artery disease Mother     COPD Mother             Coronary artery disease Father     Diabetes Father     Heart disease Sister      Review of patient's allergies indicates:   Allergen Reactions    Atorvastatin Swelling     FEET SWELLING    Rosuvastatin Swelling     FEET SWELLING    Coenzyme q10     Pioglitazone     Buprenorphine Rash     Other reaction(s): burn    Metformin Diarrhea       Current Outpatient Medications:     aspirin 81 mg Cap, Take 81 mg by mouth once daily., Disp: , Rfl:     chlorthalidone (HYGROTEN) 25 MG Tab, Take 25 mg by mouth once daily., Disp: , Rfl:     cholecalciferol, vitamin D3, 1,250 mcg (50,000 unit) capsule, Take 50,000 Units by mouth every 7 days., Disp: , Rfl:     empagliflozin (JARDIANCE) 25 mg tablet, Take 25 mg by mouth once daily., Disp: , Rfl:     ezetimibe (ZETIA) 10 mg tablet, Take 10 mg  by mouth every morning., Disp: , Rfl:     gabapentin (NEURONTIN) 600 MG tablet, Take 1 tablet by mouth 3 (three) times daily., Disp: , Rfl:     glimepiride (AMARYL) 4 MG tablet, Take 4 mg by mouth., Disp: , Rfl:     HYDROcodone-acetaminophen (NORCO)  mg per tablet, Take 1 tablet by mouth every 4 (four) hours as needed., Disp: , Rfl:     levothyroxine (SYNTHROID) 50 MCG tablet, Take 1 tablet (50 mcg total) by mouth before breakfast., Disp: 30 tablet, Rfl: 11    lisinopriL (PRINIVIL,ZESTRIL) 2.5 MG tablet, Take 2.5 mg by mouth., Disp: , Rfl:     MOVANTIK 25 mg tablet, Take 25 mg by mouth once daily., Disp: , Rfl:     naloxone (NARCAN) 4 mg/actuation [place], SMARTSI Spray(s) Both Nares, Disp: , Rfl:     OZEMPIC 2 mg/dose (8 mg/3 mL) PnIj, Inject 2 mg into the skin every 7 days., Disp: , Rfl:     REPATHA PUSHTRONEX 420 mg/3.5 mL Injt, inject 3.5 mls sub-q once per month, Disp: 3.5 mL, Rfl: 5    tizanidine (ZANAFLEX) 4 MG tablet, Take 4 mg by mouth 3 (three) times daily as needed., Disp: , Rfl:   Outpatient Medications Marked as Taking for the 23 encounter (Office Visit) with Refugio Lou MD   Medication Sig Dispense Refill    aspirin 81 mg Cap Take 81 mg by mouth once daily.      chlorthalidone (HYGROTEN) 25 MG Tab Take 25 mg by mouth once daily.      cholecalciferol, vitamin D3, 1,250 mcg (50,000 unit) capsule Take 50,000 Units by mouth every 7 days.      empagliflozin (JARDIANCE) 25 mg tablet Take 25 mg by mouth once daily.      ezetimibe (ZETIA) 10 mg tablet Take 10 mg by mouth every morning.      gabapentin (NEURONTIN) 600 MG tablet Take 1 tablet by mouth 3 (three) times daily.      glimepiride (AMARYL) 4 MG tablet Take 4 mg by mouth.      HYDROcodone-acetaminophen (NORCO)  mg per tablet Take 1 tablet by mouth every 4 (four) hours as needed.      levothyroxine (SYNTHROID) 50 MCG tablet Take 1 tablet (50 mcg total) by mouth before breakfast. 30 tablet 11    lisinopriL (PRINIVIL,ZESTRIL) 2.5 MG  tablet Take 2.5 mg by mouth.      MOVANTIK 25 mg tablet Take 25 mg by mouth once daily.      naloxone (NARCAN) 4 mg/actuation Spry SMARTSI Spray(s) Both Nares      OZEMPIC 2 mg/dose (8 mg/3 mL) PnIj Inject 2 mg into the skin every 7 days.      REPATHA PUSHTRONEX 420 mg/3.5 mL Injt inject 3.5 mls sub-q once per month 3.5 mL 5    tizanidine (ZANAFLEX) 4 MG tablet Take 4 mg by mouth 3 (three) times daily as needed.       Social History     Tobacco Use    Smoking status: Former     Types: Cigarettes    Smokeless tobacco: Never   Substance Use Topics    Alcohol use: Not Currently    Drug use: Never         Vitals:    23 0913   BP: 118/62     Gen NAD  HEENT NC/AT  CV RRR, no carotid bruits  Resp clear  GI soft  Ext no C/C/E  Neuro  AAOx4  Speech fluent, appropriate  EOMI, PERRLA, VFF  Normal facial strength, sensation  Tongue and palate midline  Motor 5/5  Sensation intact  DTRs symmetric  Coord intact  Gait Normal    Assessment: Stroke    Plan: Continue current management  Repeat MRI brain w/ and w/o contrast in one year

## 2023-05-23 ENCOUNTER — TELEPHONE (OUTPATIENT)
Dept: ORTHOPEDICS | Facility: CLINIC | Age: 58
End: 2023-05-23
Payer: MEDICARE

## 2023-05-23 DIAGNOSIS — M65.4 RADIAL STYLOID TENOSYNOVITIS OF LEFT HAND: Primary | ICD-10-CM

## 2023-05-23 DIAGNOSIS — G56.03 BILATERAL CARPAL TUNNEL SYNDROME: ICD-10-CM

## 2023-05-23 NOTE — TELEPHONE ENCOUNTER
Pt called stating that she is doing well with therapy but she is out of visits. Pt is still having a little pain. Pt and therapist are asking if Dr. Salomon recommends more therapy visits or if Dr. Salomon think's she needs to come for a follow up appointment since she is still having pain?     Pt is still not interested in surgery at this time.     I explained to her that I would talk to Dr. Salomon and call her back.

## 2023-05-23 NOTE — TELEPHONE ENCOUNTER
I called the patient to discuss what is stated by Dr. Salomon. Pt is interested in injection but not surgery. I made the pt an appointment to come in for possibly another injection. I also continued her therapy.     Pt voiced a clear understanding.

## 2023-06-07 ENCOUNTER — OFFICE VISIT (OUTPATIENT)
Dept: ORTHOPEDICS | Facility: CLINIC | Age: 58
End: 2023-06-07
Payer: MEDICARE

## 2023-06-07 VITALS
HEART RATE: 80 BPM | WEIGHT: 200 LBS | BODY MASS INDEX: 33.32 KG/M2 | SYSTOLIC BLOOD PRESSURE: 110 MMHG | DIASTOLIC BLOOD PRESSURE: 72 MMHG | TEMPERATURE: 98 F | HEIGHT: 65 IN

## 2023-06-07 DIAGNOSIS — M65.4 RADIAL STYLOID TENOSYNOVITIS OF LEFT HAND: Primary | ICD-10-CM

## 2023-06-07 PROCEDURE — 20550 NJX 1 TENDON SHEATH/LIGAMENT: CPT | Mod: LT,,, | Performed by: STUDENT IN AN ORGANIZED HEALTH CARE EDUCATION/TRAINING PROGRAM

## 2023-06-07 PROCEDURE — 99213 PR OFFICE/OUTPT VISIT, EST, LEVL III, 20-29 MIN: ICD-10-PCS | Mod: 25,,, | Performed by: STUDENT IN AN ORGANIZED HEALTH CARE EDUCATION/TRAINING PROGRAM

## 2023-06-07 PROCEDURE — 99213 OFFICE O/P EST LOW 20 MIN: CPT | Mod: 25,,, | Performed by: STUDENT IN AN ORGANIZED HEALTH CARE EDUCATION/TRAINING PROGRAM

## 2023-06-07 PROCEDURE — 20550 TENDON SHEATH: ICD-10-PCS | Mod: LT,,, | Performed by: STUDENT IN AN ORGANIZED HEALTH CARE EDUCATION/TRAINING PROGRAM

## 2023-06-07 RX ADMIN — BETAMETHASONE SODIUM PHOSPHATE AND BETAMETHASONE ACETATE 6 MG: 3; 3 INJECTION, SUSPENSION INTRA-ARTICULAR; INTRALESIONAL; INTRAMUSCULAR; SOFT TISSUE at 03:06

## 2023-06-07 RX ADMIN — LIDOCAINE HYDROCHLORIDE 1 ML: 10 INJECTION INFILTRATION; PERINEURAL at 03:06

## 2023-06-09 RX ORDER — BETAMETHASONE SODIUM PHOSPHATE AND BETAMETHASONE ACETATE 3; 3 MG/ML; MG/ML
6 INJECTION, SUSPENSION INTRA-ARTICULAR; INTRALESIONAL; INTRAMUSCULAR; SOFT TISSUE
Status: DISCONTINUED | OUTPATIENT
Start: 2023-06-07 | End: 2023-06-09 | Stop reason: HOSPADM

## 2023-06-09 RX ORDER — LIDOCAINE HYDROCHLORIDE 10 MG/ML
1 INJECTION INFILTRATION; PERINEURAL
Status: DISCONTINUED | OUTPATIENT
Start: 2023-06-07 | End: 2023-06-09 | Stop reason: HOSPADM

## 2023-06-09 NOTE — PROGRESS NOTES
Chief Complaint:  Left wrist pain    Consulting Physician: No ref. provider found    History of present illness:    Patient is a 57-year-old female who presents for follow-up evaluation of her left carpal tunnel syndrome as well as left de Quervain tenosynovitis.  I saw her in my clinic last time where I ordered a EMG and send her to therapy for the de Quervain tenosynovitis.  She is here for the EMG results.  The EMG shows that she has bilateral carpal tunnel syndrome however this is improving and has been stable with bracing.  Her biggest complaint is the left de Quervain tenosynovitis.  It is helping some with therapy although she still has pain.  She is interested in another injection for this.  She does not want an operation for this at this moment.    Past Medical History:   Diagnosis Date    Acute pulmonary embolus     De Quervain's tenosynovitis     Deep vein thrombosis     Diabetes mellitus, type 2     Diabetic neuropathy     Fibroid uterus     Hyperlipidemia     Hypertension     Lumbar back pain with radiculopathy affecting lower extremity     Lumbar spondylitis     Personal history of colonic polyps     Stroke     Vitamin D deficiency        Past Surgical History:   Procedure Laterality Date    BACK SURGERY      CHOLECYSTECTOMY  2002    Collarbone SX      COLONOSCOPY  04/11/2022    FRACTURE SURGERY  05/1/2009    Total Hip Replacenwnt    HIP FRACTURE SURGERY      JOINT REPLACEMENT  2009    Hop Replacenent    Left Femur Sx      NECK SURGERY      REPAIR OF MENISCUS OF KNEE      SPINE SURGERY  5/13/2022    Back Surgery       Current Outpatient Medications   Medication Sig    aspirin 81 mg Cap Take 81 mg by mouth once daily.    chlorthalidone (HYGROTEN) 25 MG Tab Take 25 mg by mouth once daily.    cholecalciferol, vitamin D3, 1,250 mcg (50,000 unit) capsule Take 50,000 Units by mouth every 7 days.    empagliflozin (JARDIANCE) 25 mg tablet Take 25 mg by mouth once daily.    ezetimibe (ZETIA) 10 mg tablet Take  10 mg by mouth every morning.    gabapentin (NEURONTIN) 600 MG tablet Take 1 tablet by mouth 3 (three) times daily.    glimepiride (AMARYL) 4 MG tablet Take 4 mg by mouth.    HYDROcodone-acetaminophen (NORCO)  mg per tablet Take 1 tablet by mouth every 4 (four) hours as needed.    levothyroxine (SYNTHROID) 50 MCG tablet Take 1 tablet (50 mcg total) by mouth before breakfast.    lisinopriL (PRINIVIL,ZESTRIL) 2.5 MG tablet Take 2.5 mg by mouth.    MOVANTIK 25 mg tablet Take 25 mg by mouth once daily.    OZEMPIC 2 mg/dose (8 mg/3 mL) PnIj Inject 2 mg into the skin every 7 days.    tizanidine (ZANAFLEX) 4 MG tablet Take 4 mg by mouth 3 (three) times daily as needed.    naloxone (NARCAN) 4 mg/actuation Spry SMARTSI Spray(s) Both Nares    REPATHA PUSHTRONEX 420 mg/3.5 mL Injt inject 3.5 mls sub-q once per month     No current facility-administered medications for this visit.       Review of patient's allergies indicates:   Allergen Reactions    Atorvastatin Swelling     FEET SWELLING    Rosuvastatin Swelling     FEET SWELLING    Coenzyme q10     Pioglitazone     Buprenorphine Rash     Other reaction(s): burn    Metformin Diarrhea       Family History   Problem Relation Age of Onset    Cancer Mother     Coronary artery disease Mother     COPD Mother             Coronary artery disease Father     Diabetes Father     Heart disease Sister        Social History     Socioeconomic History    Marital status: Single   Tobacco Use    Smoking status: Former     Types: Cigarettes    Smokeless tobacco: Never   Substance and Sexual Activity    Alcohol use: Not Currently    Drug use: Never    Sexual activity: Not Currently       Review of Systems:    Constitution:   Denies chills, fever, and sweats.  HENT:   Denies headaches or blurry vision.  Cardiovascular:  Denies chest pain or irregular heart beat.  Respiratory:   Denies cough or shortness of breath.  Gastrointestinal:  Denies abdominal pain, nausea, or  "vomiting.  Musculoskeletal:   Denies muscle cramps.  Neurological:   Denies dizziness or focal weakness.  Psychiatric/Behavior: Normal mental status.  Hematology/Lymph:  Denies bleeding problem or easy bruising/bleeding.  Skin:    Denies rash or suspicious lesions.    Examination:    Vital Signs:    Vitals:    06/07/23 1520 06/07/23 1522   BP: (!) 143/117 110/72   Pulse: 72 80   Temp:  98.1 °F (36.7 °C)   Weight: 90.7 kg (200 lb)    Height: 5' 5" (1.651 m)        Body mass index is 33.28 kg/m².    Constitution:   Well-developed, well nourished patient in no acute distress.  Neurological:   Alert and oriented x 3 and cooperative to examination.     Psychiatric/Behavior: Normal mental status.  Respiratory:   No shortness of breath.  Eyes:    Extraoccular muscles intact  Skin:    No scars, rash or suspicious lesions.    MSK:   Left wrist:  No open wounds or rashes.  Tenderness to palpation over the 1st dorsal compartment.  Finkelstein's test is positive.  Full range of motion of the wrist hand and digits.  There is no Tinel's over the superficial branch of the radial nerve.  No tenderness to palpation over the intersection of the 1st and 2nd dorsal compartments.  Tinel is positive over the median nerve at the wrist.  Two-point discrimination is 10 mm in the thumb index and middle fingers.  It is 5 mm in the ring and small fingers.  Phalen's and Durkan's are positive.  Apb strength is 5/5.  There is no Tinel over the median nerve proximally in the forearm.  No Tinel over the ulnar nerve over the medial epicondyle.  Flexion and compression of the elbow is negative.  Radial pulse 2 +hand is warm well perfused    Imaging:   New imaging     Assessment:  Left de Quervain tenosynovitis with cyst, left carpal tunnel syndrome    Plan:  Regarding carpal tunnel syndrome, since this is improving with bracing she would like to observe this.  I think this is reasonable.  I explained that nerve compression could ultimately lead to " muscle wasting if left untreated however since this is improving we will closely observe this.  For the left de Quervain tenosynovitis, I will give her another injection into the 1st dorsal compartment today.  I explained that this is the last injection I can give her and if this fails to alleviate her symptoms she will need an operation to treat this.  She understands and will continue therapy bracing and over-the-counter medication for now.  I can see her back in 2-3 months to see how she is doing    Follow Up:  2-3 months  Xray at next visit:  None

## 2023-06-09 NOTE — PROCEDURES
Tendon Sheath    Date/Time: 6/7/2023 3:00 PM  Performed by: Mio Salomon MD  Authorized by: Mio Salomon MD     Consent Done?:  Yes (Verbal)  Indications:  Pain  Timeout: prior to procedure the correct patient, procedure, and site was verified    Location:  Wrist  Site:  L first doral compartment  Needle size:  25 G  Approach:  Radial  Medications:  1 mL LIDOcaine HCL 10 mg/ml (1%) 10 mg/mL (1 %); 6 mg betamethasone acetate-betamethasone sodium phosphate 6 mg/mL  Patient tolerance:  Patient tolerated the procedure well with no immediate complications

## 2023-07-24 ENCOUNTER — HOSPITAL ENCOUNTER (EMERGENCY)
Facility: HOSPITAL | Age: 58
Discharge: HOME | End: 2023-07-24
Attending: FAMILY MEDICINE
Payer: MEDICARE

## 2023-07-24 VITALS
WEIGHT: 200 LBS | HEIGHT: 65 IN | DIASTOLIC BLOOD PRESSURE: 66 MMHG | TEMPERATURE: 98 F | RESPIRATION RATE: 16 BRPM | OXYGEN SATURATION: 97 % | HEART RATE: 75 BPM | SYSTOLIC BLOOD PRESSURE: 126 MMHG | BODY MASS INDEX: 33.32 KG/M2

## 2023-07-24 DIAGNOSIS — R52 PAIN: ICD-10-CM

## 2023-07-24 DIAGNOSIS — S83.422A SPRAIN OF LATERAL COLLATERAL LIGAMENT OF LEFT KNEE, INITIAL ENCOUNTER: Primary | ICD-10-CM

## 2023-07-24 PROCEDURE — 99283 EMERGENCY DEPT VISIT LOW MDM: CPT

## 2023-07-24 RX ORDER — DEXTROSE MONOHYDRATE AND SODIUM CHLORIDE 5; .45 G/100ML; G/100ML
1000 INJECTION, SOLUTION INTRAVENOUS
Status: DISCONTINUED | OUTPATIENT
Start: 2023-07-24 | End: 2023-07-24

## 2023-07-24 RX ORDER — KETOROLAC TROMETHAMINE 10 MG/1
10 TABLET, FILM COATED ORAL EVERY 6 HOURS
Qty: 15 TABLET | Refills: 0 | Status: SHIPPED | OUTPATIENT
Start: 2023-07-24 | End: 2023-07-29

## 2023-07-24 NOTE — ED PROVIDER NOTES
Encounter Date: 2023       History     Chief Complaint   Patient presents with    Knee Pain     Pt c/o of left knee pain after walking on uneven floor yesterday, pt thinks she may have twisted her knee. Pt rates her pain as a 5/10 describes as sharp in nature and worsens with ambulation, pt able to bear weight.      57-year-old female complains of some left knee pain says she twisted her knee walking on a sidewalk put some been no edema no warmth no erythema      Review of patient's allergies indicates:   Allergen Reactions    Atorvastatin Swelling     FEET SWELLING    Rosuvastatin Swelling     FEET SWELLING    Coenzyme q10     Pioglitazone     Buprenorphine Rash     Other reaction(s): burn    Metformin Diarrhea     Past Medical History:   Diagnosis Date    Acute pulmonary embolus     De Quervain's tenosynovitis     Deep vein thrombosis     Diabetes mellitus, type 2     Diabetic neuropathy     Fibroid uterus     Hyperlipidemia     Hypertension     Lumbar back pain with radiculopathy affecting lower extremity     Lumbar spondylitis     Personal history of colonic polyps     Stroke     Vitamin D deficiency      Past Surgical History:   Procedure Laterality Date    BACK SURGERY      CHOLECYSTECTOMY  2002    Collarbone SX      COLONOSCOPY  2022    FRACTURE SURGERY  2009    Total Hip Replacenwnt    HIP FRACTURE SURGERY      JOINT REPLACEMENT      Hop Replacenent    Left Femur Sx      NECK SURGERY      REPAIR OF MENISCUS OF KNEE      SPINE SURGERY  2022    Back Surgery     Family History   Problem Relation Age of Onset    Cancer Mother     Coronary artery disease Mother     COPD Mother             Coronary artery disease Father     Diabetes Father     Heart disease Sister      Social History     Tobacco Use    Smoking status: Former     Types: Cigarettes    Smokeless tobacco: Never   Substance Use Topics    Alcohol use: Not Currently    Drug use: Never     Review of Systems    Constitutional:  Negative for fever.   HENT:  Negative for sore throat.    Respiratory:  Negative for shortness of breath.    Cardiovascular:  Negative for chest pain.   Gastrointestinal:  Negative for nausea.   Genitourinary:  Negative for dysuria.   Musculoskeletal:  Negative for back pain.        Joint pain in the left knee   Skin:  Negative for rash.   Neurological:  Negative for weakness.   Hematological:  Does not bruise/bleed easily.   All other systems reviewed and are negative.    Physical Exam     Initial Vitals [07/24/23 0715]   BP Pulse Resp Temp SpO2   126/66 75 16 97.8 °F (36.6 °C) 97 %      MAP       --         Physical Exam    Nursing note and vitals reviewed.  Constitutional: She appears well-developed and well-nourished. She is active.   HENT:   Head: Normocephalic and atraumatic.   Eyes: Conjunctivae, EOM and lids are normal. Pupils are equal, round, and reactive to light.   Neck: Trachea normal and phonation normal. Neck supple. No thyroid mass present.   Normal range of motion.  Cardiovascular:  Normal rate, regular rhythm, normal heart sounds and normal pulses.           Pulmonary/Chest: Breath sounds normal.   Abdominal: Abdomen is soft.   Musculoskeletal:         General: Normal range of motion.      Cervical back: Normal range of motion and neck supple.      Comments: Full ranging of the knee no edema or swelling no tenderness to palpation     Neurological: She is alert and oriented to person, place, and time.   Skin: Skin is warm and intact.   Psychiatric: She has a normal mood and affect. Her speech is normal and behavior is normal. Judgment and thought content normal. Cognition and memory are normal.       ED Course   Procedures  Labs Reviewed - No data to display       Imaging Results              X-Ray Knee 3 View Left (Final result)  Result time 07/24/23 07:43:16      Final result by Aries Arshad MD (07/24/23 07:43:16)                   Impression:      No acute osseous  findings      Electronically signed by: Aries Arshad MD  Date:    07/24/2023  Time:    07:43               Narrative:    EXAMINATION:  Four views left knee    CLINICAL HISTORY:  Pain    COMPARISON:  None    FINDINGS:  Distal femoral hardware shows no evidence of loosening.  No acute fracture or dislocation.  No joint effusion.                                       Medications - No data to display  Medical Decision Making:   Initial Assessment:   57-year-old female complains of some left knee pain says she twisted her knee walking on a sidewalk put some been no edema no warmth no erythema    Initial exam shows pretty normal looking knee no deformities no swelling no warmth there with the will full range of motion x-ray unremarkable most likely knee sprain recommend take meds as prescribed follow up as needed with PCP ice as needed  Differential Diagnosis:   Differential diagnosis would be knee sprain dislocation fracture torn ligament  Clinical Tests:   Radiological Study: Ordered and Reviewed  ED Management:  ED management decided to do x-rays also give her some oral meds for the pain x-rays read by myself independently seen no acute changes no fractures no dislocations  Given the patient's age and medical history injury would recommend she rest it ice it take meds as appropriate follow up with the PCP in 1 week for reassessment                        Clinical Impression:   Final diagnoses:  [R52] Pain  [S83.422A] Sprain of lateral collateral ligament of left knee, initial encounter (Primary)        ED Disposition Condition    Discharge Stable          ED Prescriptions       Medication Sig Dispense Start Date End Date Auth. Provider    ketorolac (TORADOL) 10 mg tablet Take 1 tablet (10 mg total) by mouth every 6 (six) hours. for 5 days 15 tablet 7/24/2023 7/29/2023 Claudio Marcos MD          Follow-up Information       Follow up With Specialties Details Why Contact Info    [facility] In 1 week  [address] [facility] [address]  [phone]               Claudio Marcos MD  07/24/23 0801       Claudio Marcos MD  07/24/23 0816

## 2023-07-24 NOTE — Clinical Note
"Heather"Ross Arteaga was seen and treated in our emergency department on 7/24/2023.  She may return to work on 07/25/2023.       If you have any questions or concerns, please don't hesitate to call.      Julia Ceja RN    "

## 2023-08-04 ENCOUNTER — LAB VISIT (OUTPATIENT)
Dept: LAB | Facility: HOSPITAL | Age: 58
End: 2023-08-04
Attending: INTERNAL MEDICINE
Payer: MEDICAID

## 2023-08-04 DIAGNOSIS — I10 ESSENTIAL HYPERTENSION, MALIGNANT: ICD-10-CM

## 2023-08-04 DIAGNOSIS — E78.5 HYPERLIPIDEMIA, UNSPECIFIED HYPERLIPIDEMIA TYPE: Primary | ICD-10-CM

## 2023-08-04 LAB
ALBUMIN SERPL-MCNC: 4 G/DL (ref 3.5–5)
ALBUMIN/GLOB SERPL: 1.4 RATIO (ref 1.1–2)
ALP SERPL-CCNC: 80 UNIT/L (ref 40–150)
ALT SERPL-CCNC: 32 UNIT/L (ref 0–55)
AST SERPL-CCNC: 26 UNIT/L (ref 5–34)
BILIRUBIN DIRECT+TOT PNL SERPL-MCNC: 0.4 MG/DL
BUN SERPL-MCNC: 13.8 MG/DL (ref 9.8–20.1)
CALCIUM SERPL-MCNC: 9.7 MG/DL (ref 8.4–10.2)
CHLORIDE SERPL-SCNC: 104 MMOL/L (ref 98–107)
CHOLEST SERPL-MCNC: 191 MG/DL
CHOLEST/HDLC SERPL: 3 {RATIO} (ref 0–5)
CO2 SERPL-SCNC: 31 MMOL/L (ref 22–29)
CREAT SERPL-MCNC: 0.68 MG/DL (ref 0.55–1.02)
GFR SERPLBLD CREATININE-BSD FMLA CKD-EPI: >60 MLS/MIN/1.73/M2
GLOBULIN SER-MCNC: 2.8 GM/DL (ref 2.4–3.5)
GLUCOSE SERPL-MCNC: 117 MG/DL (ref 74–100)
HDLC SERPL-MCNC: 66 MG/DL (ref 35–60)
LDLC SERPL CALC-MCNC: 114 MG/DL (ref 50–140)
POTASSIUM SERPL-SCNC: 3.9 MMOL/L (ref 3.5–5.1)
PROT SERPL-MCNC: 6.8 GM/DL (ref 6.4–8.3)
SODIUM SERPL-SCNC: 142 MMOL/L (ref 136–145)
TRIGL SERPL-MCNC: 56 MG/DL (ref 37–140)
VLDLC SERPL CALC-MCNC: 11 MG/DL

## 2023-08-04 PROCEDURE — 36415 COLL VENOUS BLD VENIPUNCTURE: CPT

## 2023-08-04 PROCEDURE — 80053 COMPREHEN METABOLIC PANEL: CPT

## 2023-08-04 PROCEDURE — 80061 LIPID PANEL: CPT

## 2023-08-12 ENCOUNTER — HOSPITAL ENCOUNTER (EMERGENCY)
Facility: HOSPITAL | Age: 58
Discharge: HOME | End: 2023-08-12
Attending: FAMILY MEDICINE
Payer: MEDICARE

## 2023-08-12 VITALS
TEMPERATURE: 98 F | SYSTOLIC BLOOD PRESSURE: 150 MMHG | OXYGEN SATURATION: 96 % | HEART RATE: 66 BPM | DIASTOLIC BLOOD PRESSURE: 75 MMHG | BODY MASS INDEX: 33.28 KG/M2 | WEIGHT: 200 LBS | RESPIRATION RATE: 20 BRPM

## 2023-08-12 DIAGNOSIS — Z76.89 ENCOUNTER FOR INCISION AND DRAINAGE PROCEDURE: ICD-10-CM

## 2023-08-12 DIAGNOSIS — L03.311 CELLULITIS OF ABDOMINAL WALL: Primary | ICD-10-CM

## 2023-08-12 PROCEDURE — 99283 EMERGENCY DEPT VISIT LOW MDM: CPT | Mod: 25

## 2023-08-12 PROCEDURE — 87077 CULTURE AEROBIC IDENTIFY: CPT | Performed by: FAMILY MEDICINE

## 2023-08-12 PROCEDURE — 10060 I&D ABSCESS SIMPLE/SINGLE: CPT

## 2023-08-12 PROCEDURE — 25000003 PHARM REV CODE 250: Performed by: FAMILY MEDICINE

## 2023-08-12 RX ORDER — IBUPROFEN 800 MG/1
800 TABLET ORAL
Status: COMPLETED | OUTPATIENT
Start: 2023-08-12 | End: 2023-08-12

## 2023-08-12 RX ORDER — LIDOCAINE AND PRILOCAINE 25; 25 MG/G; MG/G
CREAM TOPICAL ONCE
Status: DISCONTINUED | OUTPATIENT
Start: 2023-08-12 | End: 2023-08-12

## 2023-08-12 RX ORDER — LIDOCAINE 40 MG/G
CREAM TOPICAL
Status: DISCONTINUED | OUTPATIENT
Start: 2023-08-12 | End: 2023-08-12 | Stop reason: HOSPADM

## 2023-08-12 RX ORDER — CLINDAMYCIN HYDROCHLORIDE 300 MG/1
300 CAPSULE ORAL 3 TIMES DAILY
Qty: 21 CAPSULE | Refills: 0 | Status: SHIPPED | OUTPATIENT
Start: 2023-08-12 | End: 2023-08-19

## 2023-08-12 RX ORDER — ACETAMINOPHEN 500 MG
1000 TABLET ORAL
Status: COMPLETED | OUTPATIENT
Start: 2023-08-12 | End: 2023-08-12

## 2023-08-12 RX ADMIN — ACETAMINOPHEN 1000 MG: 500 TABLET, FILM COATED ORAL at 06:08

## 2023-08-12 RX ADMIN — IBUPROFEN 800 MG: 800 TABLET, FILM COATED ORAL at 06:08

## 2023-08-12 RX ADMIN — LIDOCAINE: 40 CREAM TOPICAL at 06:08

## 2023-08-12 NOTE — ED PROVIDER NOTES
Encounter Date: 2023       History     Chief Complaint   Patient presents with    Abscess     C/O abscess to left lower abdomen denies drainage ecchymosis noted to area      57-year-old complains of small abscess left lower abdominal area no fevers no chills        Review of patient's allergies indicates:   Allergen Reactions    Atorvastatin Swelling     FEET SWELLING    Rosuvastatin Swelling     FEET SWELLING    Coenzyme q10     Pioglitazone     Buprenorphine Rash     Other reaction(s): burn    Metformin Diarrhea     Past Medical History:   Diagnosis Date    Acute pulmonary embolus     De Quervain's tenosynovitis     Deep vein thrombosis     Diabetes mellitus, type 2     Diabetic neuropathy     Fibroid uterus     Hyperlipidemia     Hypertension     Lumbar back pain with radiculopathy affecting lower extremity     Lumbar spondylitis     Personal history of colonic polyps     Stroke     Vitamin D deficiency      Past Surgical History:   Procedure Laterality Date    BACK SURGERY      CHOLECYSTECTOMY      Collarbone SX      COLONOSCOPY  2022    FRACTURE SURGERY  2009    Total Hip Replacenwnt    HIP FRACTURE SURGERY      JOINT REPLACEMENT      Hop Replacenent    Left Femur Sx      NECK SURGERY      REPAIR OF MENISCUS OF KNEE      SPINE SURGERY  2022    Back Surgery     Family History   Problem Relation Age of Onset    Cancer Mother     Coronary artery disease Mother     COPD Mother             Coronary artery disease Father     Diabetes Father     Heart disease Sister      Social History     Tobacco Use    Smoking status: Former     Current packs/day: 0.00     Types: Cigarettes    Smokeless tobacco: Never   Substance Use Topics    Alcohol use: Not Currently    Drug use: Never     Review of Systems   Constitutional:  Negative for fever.   HENT:  Negative for sore throat.    Respiratory:  Negative for shortness of breath.    Cardiovascular:  Negative for chest pain.   Gastrointestinal:   Negative for nausea.   Genitourinary:  Negative for dysuria.   Musculoskeletal:  Negative for back pain.   Skin:  Positive for wound. Negative for rash.   Neurological:  Negative for weakness.   Hematological:  Does not bruise/bleed easily.   All other systems reviewed and are negative.      Physical Exam     Initial Vitals [08/12/23 0629]   BP Pulse Resp Temp SpO2   (!) 150/75 66 20 98.2 °F (36.8 °C) 96 %      MAP       --         Physical Exam    Nursing note and vitals reviewed.  Constitutional: She appears well-developed and well-nourished. She is active.   HENT:   Head: Normocephalic and atraumatic.   Eyes: Conjunctivae, EOM and lids are normal.   Neck: Trachea normal and phonation normal. Neck supple. No thyroid mass present.   Normal range of motion.  Cardiovascular:  Normal rate, regular rhythm, normal heart sounds and normal pulses.           Abdominal: Abdomen is soft. Bowel sounds are normal.   Small abscess left lower abdominal area superficial   Musculoskeletal:         General: Normal range of motion.      Cervical back: Normal range of motion and neck supple.     Neurological: She is alert and oriented to person, place, and time. She has normal strength.   Skin: Skin is warm and intact. Abscess noted.   Psychiatric: She has a normal mood and affect. Her speech is normal and behavior is normal. Judgment and thought content normal. Cognition and memory are normal.         ED Course   I & D - Incision and Drainage    Date/Time: 8/12/2023 7:32 AM  Location procedure was performed: [facility] EMERGENCY DEPARTMENT    Performed by: Claudio Marcos MD  Authorized by: Claudio Marcos MD  Pre-operative diagnosis: Abscess  Post-operative diagnosis: Abscess  Consent Done: Yes  Consent: Verbal consent obtained.  Risks and benefits: risks, benefits and alternatives were discussed  Consent given by: patient  Patient understanding: patient states understanding of the procedure being performed  Type:  abscess  Body area: trunk  Location details: abdomen  Anesthesia: see MAR for details    Anesthesia:  Local Anesthetic: topical anesthetic    Patient sedated: no  Incision depth: dermal  Complexity: simple  Drainage: pus  Drainage amount: scant  Technical procedures used: 18 gauge needle  Complications: No  Estimated blood loss (mL): 0  Specimens: Yes  Patient tolerance: Patient tolerated the procedure well with no immediate complications  Comments: Culture sent abscess completely drained with 18 gauge needle    Incision depth: dermal        Labs Reviewed   WOUND CULTURE (OLG)          Imaging Results    None          Medications   LIDOcaine 4 % cream ( Topical (Top) Given 8/12/23 0659)   ibuprofen tablet 800 mg (800 mg Oral Given 8/12/23 0658)   acetaminophen tablet 1,000 mg (1,000 mg Oral Given 8/12/23 0659)     Medical Decision Making:   Initial Assessment:   57-year-old complains of small abscess left lower abdominal area with some surrounding cellulitis no fevers no chills vital signs are stable 4% lidocaine gel was placed over the small abscess area waited 30 minutes an 18 gauge was needed to probe abscess all pus drained out culture sent patient tolerated procedure well we will discharge home with antibiotics for the cellulitis have him follow up with PCP in 3 days check culture results  Differential Diagnosis:   Abscess versus cellulitis  Clinical Tests:   Lab Tests: Ordered  ED Management:  Patient had I and D procedure with 18 gauge needle also given medicine for pain                          Clinical Impression:   Final diagnoses:  [L03.311] Cellulitis of abdominal wall (Primary)  [Z76.89] Encounter for incision and drainage procedure        ED Disposition Condition    Discharge Stable          ED Prescriptions       Medication Sig Dispense Start Date End Date Auth. Provider    clindamycin (CLEOCIN) 300 MG capsule Take 1 capsule (300 mg total) by mouth 3 (three) times daily. for 7 days 21 capsule  8/12/2023 8/19/2023 Claudio Marcos MD          Follow-up Information       Follow up With Specialties Details Why Contact Info    [facility] In 3 days  [address] [facility] [address]  [phone]               Claudio Marcos MD  08/12/23 5296

## 2023-08-14 DIAGNOSIS — L98.9 SKIN LESION: Primary | ICD-10-CM

## 2023-08-14 LAB — BACTERIA SPEC CULT: ABNORMAL

## 2023-08-16 ENCOUNTER — HOSPITAL ENCOUNTER (OUTPATIENT)
Dept: RADIOLOGY | Facility: HOSPITAL | Age: 58
Discharge: HOME | End: 2023-08-16
Attending: SURGERY
Payer: MEDICAID

## 2023-08-16 DIAGNOSIS — L98.9 SKIN LESION: ICD-10-CM

## 2023-08-16 PROCEDURE — 76700 US EXAM ABDOM COMPLETE: CPT | Mod: TC

## 2023-08-21 DIAGNOSIS — R13.10 PROBLEMS WITH SWALLOWING AND MASTICATION: Primary | ICD-10-CM

## 2023-08-24 ENCOUNTER — HOSPITAL ENCOUNTER (OUTPATIENT)
Dept: RADIOLOGY | Facility: HOSPITAL | Age: 58
Discharge: HOME | End: 2023-08-24
Attending: SURGERY
Payer: MEDICARE

## 2023-08-24 ENCOUNTER — HOSPITAL ENCOUNTER (OUTPATIENT)
Dept: RADIOLOGY | Facility: HOSPITAL | Age: 58
Discharge: HOME | End: 2023-08-24
Attending: SURGERY
Payer: MEDICAID

## 2023-08-24 DIAGNOSIS — R13.10 PROBLEMS WITH SWALLOWING AND MASTICATION: ICD-10-CM

## 2023-08-25 ENCOUNTER — HOSPITAL ENCOUNTER (OUTPATIENT)
Dept: RADIOLOGY | Facility: HOSPITAL | Age: 58
Discharge: HOME | End: 2023-08-25
Attending: SURGERY
Payer: MEDICARE

## 2023-08-25 PROCEDURE — 74240 X-RAY XM UPR GI TRC 1CNTRST: CPT | Mod: TC

## 2023-08-25 PROCEDURE — 74248 X-RAY SM INT F-THRU STD: CPT | Mod: TC

## 2023-10-19 ENCOUNTER — OFFICE VISIT (OUTPATIENT)
Dept: GYNECOLOGY | Facility: CLINIC | Age: 58
End: 2023-10-19
Payer: MEDICARE

## 2023-10-19 ENCOUNTER — TELEPHONE (OUTPATIENT)
Dept: GYNECOLOGY | Facility: CLINIC | Age: 58
End: 2023-10-19

## 2023-10-19 VITALS
TEMPERATURE: 98 F | DIASTOLIC BLOOD PRESSURE: 79 MMHG | WEIGHT: 210.19 LBS | SYSTOLIC BLOOD PRESSURE: 135 MMHG | HEIGHT: 65 IN | OXYGEN SATURATION: 100 % | HEART RATE: 61 BPM | BODY MASS INDEX: 35.02 KG/M2 | RESPIRATION RATE: 18 BRPM

## 2023-10-19 DIAGNOSIS — Z01.419 WOMEN'S ANNUAL ROUTINE GYNECOLOGICAL EXAMINATION: Primary | ICD-10-CM

## 2023-10-19 DIAGNOSIS — Z12.31 SCREENING MAMMOGRAM FOR BREAST CANCER: ICD-10-CM

## 2023-10-19 DIAGNOSIS — R23.2 HOT FLASHES: ICD-10-CM

## 2023-10-19 DIAGNOSIS — R82.90 BAD ODOR OF URINE: ICD-10-CM

## 2023-10-19 DIAGNOSIS — N39.0 FREQUENT UTI: ICD-10-CM

## 2023-10-19 LAB
APPEARANCE UR: CLEAR
BACTERIA #/AREA URNS AUTO: ABNORMAL /HPF
BILIRUB SERPL-MCNC: NEGATIVE MG/DL
BILIRUB UR QL STRIP.AUTO: NEGATIVE
BLOOD URINE, POC: NORMAL
COLOR UR AUTO: YELLOW
COLOR, POC UA: YELLOW
GLUCOSE UR QL STRIP.AUTO: ABNORMAL
GLUCOSE UR QL STRIP: NEGATIVE
HYALINE CASTS #/AREA URNS LPF: ABNORMAL /LPF
KETONES UR QL STRIP.AUTO: NEGATIVE
KETONES UR QL STRIP: NEGATIVE
LEUKOCYTE ESTERASE UR QL STRIP.AUTO: 75
LEUKOCYTE ESTERASE URINE, POC: NORMAL
MUCOUS THREADS URNS QL MICRO: ABNORMAL /LPF
NITRITE UR QL STRIP.AUTO: NEGATIVE
NITRITE, POC UA: NEGATIVE
PH UR STRIP.AUTO: 5.5 [PH]
PH, POC UA: 5.5
PROT UR QL STRIP.AUTO: ABNORMAL
PROTEIN, POC: NORMAL
RBC #/AREA URNS AUTO: ABNORMAL /HPF
RBC UR QL AUTO: NEGATIVE
SP GR UR STRIP.AUTO: 1.03 (ref 1–1.03)
SPECIFIC GRAVITY, POC UA: 1.03
SQUAMOUS #/AREA URNS LPF: ABNORMAL /HPF
UROBILINOGEN UR STRIP-ACNC: NORMAL
UROBILINOGEN, POC UA: 0.2
WBC #/AREA URNS AUTO: ABNORMAL /HPF

## 2023-10-19 PROCEDURE — G0101 PR CA SCREEN;PELVIC/BREAST EXAM: ICD-10-PCS | Mod: GZ,S$PBB,, | Performed by: NURSE PRACTITIONER

## 2023-10-19 PROCEDURE — 87088 URINE BACTERIA CULTURE: CPT | Performed by: NURSE PRACTITIONER

## 2023-10-19 PROCEDURE — G0101 CA SCREEN;PELVIC/BREAST EXAM: HCPCS | Mod: GZ,S$PBB,, | Performed by: NURSE PRACTITIONER

## 2023-10-19 PROCEDURE — 81001 URINALYSIS AUTO W/SCOPE: CPT | Mod: PBBFAC | Performed by: NURSE PRACTITIONER

## 2023-10-19 PROCEDURE — 99215 OFFICE O/P EST HI 40 MIN: CPT | Mod: PBBFAC | Performed by: NURSE PRACTITIONER

## 2023-10-19 PROCEDURE — 81001 URINALYSIS AUTO W/SCOPE: CPT | Performed by: NURSE PRACTITIONER

## 2023-10-19 RX ORDER — NITROFURANTOIN 25; 75 MG/1; MG/1
100 CAPSULE ORAL 2 TIMES DAILY
Qty: 10 CAPSULE | Refills: 0 | Status: SHIPPED | OUTPATIENT
Start: 2023-10-19 | End: 2023-10-24

## 2023-10-19 RX ORDER — VENLAFAXINE HYDROCHLORIDE 37.5 MG/1
37.5 CAPSULE, EXTENDED RELEASE ORAL DAILY
Qty: 30 CAPSULE | Refills: 11 | Status: SHIPPED | OUTPATIENT
Start: 2023-10-19 | End: 2024-10-18

## 2023-10-19 NOTE — PROGRESS NOTES
"Patient ID: Heather Arteaga is a 57 y.o. female.    Chief Complaint: Annual Exam      Review of patient's allergies indicates:   Allergen Reactions    Atorvastatin Swelling     FEET SWELLING    Rosuvastatin Swelling     FEET SWELLING    Coenzyme q10     Pioglitazone     Buprenorphine Rash     Other reaction(s): burn    Metformin Diarrhea             HPI:  Pt is G0 here for annual gyn exam. Denies hx of abnormal pap. Last pap 10/2022=NIL;HPV negative. Reports hx of uterine embolization secondary to AUB-L in 2017. Denies any vaginal bleeding/discharge. Denies abd/pelvic pain.Pt is . Denies dyspareunia/vaginal dryness. Today, pt c/o frequent UTIs. States had 3 in the past year. States is wiping properly. Denies hx of renal stones. Pt is former smoker. Quit over 15 years ago and smoked for 10 yr duration.  Pt also reports hot flashes. She was prescribed venlafaxine on two separate occasions but states never started as she did not want to take another medication. States hot flashes are bothersome and she does want treatment. She has never tried otc options. HRT contraindicated secondary to co morbidities.PMHx: PE (2015-took xarelto x 1 year), DM, HTN, Hypothyroidism  Review of Systems:   Negative except for findings in HPI     Objective:   /79   Pulse 61   Temp 97.9 °F (36.6 °C) (Oral)   Resp 18   Ht 5' 5" (1.651 m)   Wt 95.3 kg (210 lb 3.2 oz)   SpO2 100%   BMI 34.98 kg/m²    Physical Exam:  GENERAL: Pt is aware and alert and  in no acute distress.  BREASTS: Bilateral-No masses, nipple discharge, skin changes, or tenderness.  ABDOMEN: Soft, non tender.  VULVA:  No lesions or skin changes.  URETHRA: No lesions  BLADDER: No tenderness.  VAGINA: Mucosa pale;no discharge; no lesions.  CERVIX:  no CMT, NO discharge; NO lesions  BIMANUAL EXAM: The uterus has poor descent; limited exam; nontender, no palpable masses. Blas adnexa reveal no evidence of masses; no fullness   SKIN: Warm and Dry  PSYCHIATRIC: " Patient is awake and alert. Mood and affect are normal.    Assessment:   Women's annual routine gynecological examination    Screening mammogram for breast cancer  -     Mammo Digital Screening Bilat w/ Neal; Future; Expected date: 01/19/2024    Bad odor of urine  -     POCT urinalysis, dipstick or tablet reag  -     Urinalysis, Reflex to Urine Culture    Frequent UTI  -     Ambulatory referral/consult to Urology; Future; Expected date: 01/19/2024    Hot flashes  -     venlafaxine (EFFEXOR-XR) 37.5 MG 24 hr capsule; Take 1 capsule (37.5 mg total) by mouth once daily.  Dispense: 30 capsule; Refill: 11            1. Women's annual routine gynecological examination    2. Screening mammogram for breast cancer    3. Bad odor of urine    4. Frequent UTI    5. Hot flashes             -pelvic; pap UTD per ACOG guidelines  -declined STI screening  -Contact clinic with any vaginal bleeding as this would be abnormal in postmenopausal pt.   -discussed otc estroven for hot flashes; if no improvement after 6-8 weeks, may  venlafaxine rx  -reiterated proper wiping technique; empty bladder before and after intercourse; refer to Urology (pt reports 3 UTIs in past year)  Plan:       Follow up in about 1 year (around 10/19/2024).

## 2023-10-19 NOTE — TELEPHONE ENCOUNTER
Please inform pt that it looks like her urine sample was contaminated but if she is having symptoms, I have sent a prescription to her pharmacy while we wait of the culture

## 2023-10-20 ENCOUNTER — TELEPHONE (OUTPATIENT)
Dept: GYNECOLOGY | Facility: CLINIC | Age: 58
End: 2023-10-20
Payer: MEDICAID

## 2023-10-21 LAB — BACTERIA UR CULT: NORMAL

## 2023-12-14 ENCOUNTER — HOSPITAL ENCOUNTER (OUTPATIENT)
Dept: RADIOLOGY | Facility: HOSPITAL | Age: 58
Discharge: HOME | End: 2023-12-14
Attending: NURSE PRACTITIONER
Payer: MEDICARE

## 2023-12-14 DIAGNOSIS — Z12.31 SCREENING MAMMOGRAM FOR BREAST CANCER: ICD-10-CM

## 2023-12-14 PROCEDURE — 77067 MAMMO DIGITAL SCREENING BILAT WITH TOMO: ICD-10-PCS | Mod: 26,,, | Performed by: STUDENT IN AN ORGANIZED HEALTH CARE EDUCATION/TRAINING PROGRAM

## 2023-12-14 PROCEDURE — 77067 SCR MAMMO BI INCL CAD: CPT | Mod: TC

## 2023-12-14 PROCEDURE — 77067 SCR MAMMO BI INCL CAD: CPT | Mod: 26,,, | Performed by: STUDENT IN AN ORGANIZED HEALTH CARE EDUCATION/TRAINING PROGRAM

## 2023-12-14 PROCEDURE — 77063 MAMMO DIGITAL SCREENING BILAT WITH TOMO: ICD-10-PCS | Mod: 26,,, | Performed by: STUDENT IN AN ORGANIZED HEALTH CARE EDUCATION/TRAINING PROGRAM

## 2023-12-14 PROCEDURE — 77063 BREAST TOMOSYNTHESIS BI: CPT | Mod: 26,,, | Performed by: STUDENT IN AN ORGANIZED HEALTH CARE EDUCATION/TRAINING PROGRAM

## 2024-02-07 ENCOUNTER — LAB VISIT (OUTPATIENT)
Dept: LAB | Facility: HOSPITAL | Age: 59
End: 2024-02-07
Attending: INTERNAL MEDICINE
Payer: MEDICARE

## 2024-02-07 DIAGNOSIS — R30.0 DYSURIA: Primary | ICD-10-CM

## 2024-02-07 DIAGNOSIS — E11.59 OBESITY, DIABETES, AND HYPERTENSION SYNDROME: ICD-10-CM

## 2024-02-07 DIAGNOSIS — I15.2 ENDOCRINE HYPERTENSION: ICD-10-CM

## 2024-02-07 DIAGNOSIS — E11.69 OBESITY, DIABETES, AND HYPERTENSION SYNDROME: ICD-10-CM

## 2024-02-07 DIAGNOSIS — I15.2 OBESITY, DIABETES, AND HYPERTENSION SYNDROME: ICD-10-CM

## 2024-02-07 DIAGNOSIS — E66.9 OBESITY, DIABETES, AND HYPERTENSION SYNDROME: ICD-10-CM

## 2024-02-07 LAB
ALBUMIN SERPL-MCNC: 3.8 G/DL (ref 3.5–5)
ALBUMIN/GLOB SERPL: 1.3 RATIO (ref 1.1–2)
ALP SERPL-CCNC: 91 UNIT/L (ref 40–150)
ALT SERPL-CCNC: 20 UNIT/L (ref 0–55)
APPEARANCE UR: CLEAR
AST SERPL-CCNC: 16 UNIT/L (ref 5–34)
BACTERIA #/AREA URNS AUTO: ABNORMAL /HPF
BASOPHILS # BLD AUTO: 0.03 X10(3)/MCL
BASOPHILS NFR BLD AUTO: 0.4 %
BILIRUB SERPL-MCNC: 0.5 MG/DL
BILIRUB UR QL STRIP.AUTO: NEGATIVE
BUN SERPL-MCNC: 9 MG/DL (ref 9.8–20.1)
CALCIUM SERPL-MCNC: 9.6 MG/DL (ref 8.4–10.2)
CHLORIDE SERPL-SCNC: 104 MMOL/L (ref 98–107)
CHOLEST SERPL-MCNC: 180 MG/DL
CHOLEST/HDLC SERPL: 2 {RATIO} (ref 0–5)
CO2 SERPL-SCNC: 32 MMOL/L (ref 22–29)
COLOR UR AUTO: YELLOW
CREAT SERPL-MCNC: 0.63 MG/DL (ref 0.55–1.02)
CREAT UR-MCNC: 146.7 MG/DL (ref 45–106)
EOSINOPHIL # BLD AUTO: 0.11 X10(3)/MCL (ref 0–0.9)
EOSINOPHIL NFR BLD AUTO: 1.4 %
ERYTHROCYTE [DISTWIDTH] IN BLOOD BY AUTOMATED COUNT: 12.6 % (ref 11.5–17)
EST. AVERAGE GLUCOSE BLD GHB EST-MCNC: 194.4 MG/DL
GFR SERPLBLD CREATININE-BSD FMLA CKD-EPI: >60 MLS/MIN/1.73/M2
GLOBULIN SER-MCNC: 3 GM/DL (ref 2.4–3.5)
GLUCOSE SERPL-MCNC: 124 MG/DL (ref 74–100)
GLUCOSE UR QL STRIP.AUTO: NEGATIVE
HBA1C MFR BLD: 8.4 %
HCT VFR BLD AUTO: 40 % (ref 37–47)
HDLC SERPL-MCNC: 79 MG/DL (ref 35–60)
HGB BLD-MCNC: 12.3 G/DL (ref 12–16)
IMM GRANULOCYTES # BLD AUTO: 0.01 X10(3)/MCL (ref 0–0.04)
IMM GRANULOCYTES NFR BLD AUTO: 0.1 %
KETONES UR QL STRIP.AUTO: NEGATIVE
LDLC SERPL CALC-MCNC: 89 MG/DL (ref 50–140)
LEUKOCYTE ESTERASE UR QL STRIP.AUTO: ABNORMAL
LYMPHOCYTES # BLD AUTO: 3.59 X10(3)/MCL (ref 0.6–4.6)
LYMPHOCYTES NFR BLD AUTO: 45.8 %
MCH RBC QN AUTO: 27.3 PG (ref 27–31)
MCHC RBC AUTO-ENTMCNC: 30.8 G/DL (ref 33–36)
MCV RBC AUTO: 88.7 FL (ref 80–94)
MICROALBUMIN UR-MCNC: 61.3 UG/ML
MICROALBUMIN/CREAT RATIO PNL UR: 41.8 MG/GM CR (ref 0–30)
MONOCYTES # BLD AUTO: 0.46 X10(3)/MCL (ref 0.1–1.3)
MONOCYTES NFR BLD AUTO: 5.9 %
NEUTROPHILS # BLD AUTO: 3.63 X10(3)/MCL (ref 2.1–9.2)
NEUTROPHILS NFR BLD AUTO: 46.4 %
NITRITE UR QL STRIP.AUTO: POSITIVE
PH UR STRIP.AUTO: 8.5 [PH]
PLATELET # BLD AUTO: 220 X10(3)/MCL (ref 130–400)
PMV BLD AUTO: 9.8 FL (ref 7.4–10.4)
POTASSIUM SERPL-SCNC: 4.4 MMOL/L (ref 3.5–5.1)
PROT SERPL-MCNC: 6.8 GM/DL (ref 6.4–8.3)
PROT UR QL STRIP.AUTO: 30
RBC # BLD AUTO: 4.51 X10(6)/MCL (ref 4.2–5.4)
RBC UR QL AUTO: NEGATIVE
SODIUM SERPL-SCNC: 144 MMOL/L (ref 136–145)
SP GR UR STRIP.AUTO: 1.02 (ref 1–1.03)
SQUAMOUS #/AREA URNS AUTO: ABNORMAL /HPF
TRIGL SERPL-MCNC: 58 MG/DL (ref 37–140)
UROBILINOGEN UR STRIP-ACNC: 1
VLDLC SERPL CALC-MCNC: 12 MG/DL
WBC # SPEC AUTO: 7.83 X10(3)/MCL (ref 4.5–11.5)
WBC #/AREA URNS AUTO: ABNORMAL /HPF

## 2024-02-07 PROCEDURE — 36415 COLL VENOUS BLD VENIPUNCTURE: CPT

## 2024-02-07 PROCEDURE — 80061 LIPID PANEL: CPT

## 2024-02-07 PROCEDURE — 80053 COMPREHEN METABOLIC PANEL: CPT

## 2024-02-07 PROCEDURE — 87086 URINE CULTURE/COLONY COUNT: CPT

## 2024-02-07 PROCEDURE — 81003 URINALYSIS AUTO W/O SCOPE: CPT

## 2024-02-07 PROCEDURE — 83036 HEMOGLOBIN GLYCOSYLATED A1C: CPT

## 2024-02-07 PROCEDURE — 82043 UR ALBUMIN QUANTITATIVE: CPT

## 2024-02-07 PROCEDURE — 85025 COMPLETE CBC W/AUTO DIFF WBC: CPT

## 2024-02-09 LAB — BACTERIA UR CULT: ABNORMAL

## 2024-02-16 ENCOUNTER — HOSPITAL ENCOUNTER (EMERGENCY)
Facility: HOSPITAL | Age: 59
Discharge: HOME | End: 2024-02-16
Attending: EMERGENCY MEDICINE
Payer: MEDICARE

## 2024-02-16 VITALS
BODY MASS INDEX: 36.65 KG/M2 | RESPIRATION RATE: 20 BRPM | OXYGEN SATURATION: 97 % | DIASTOLIC BLOOD PRESSURE: 76 MMHG | TEMPERATURE: 98 F | HEART RATE: 92 BPM | SYSTOLIC BLOOD PRESSURE: 145 MMHG | HEIGHT: 65 IN | WEIGHT: 220 LBS

## 2024-02-16 DIAGNOSIS — N30.00 ACUTE CYSTITIS WITHOUT HEMATURIA: Primary | ICD-10-CM

## 2024-02-16 LAB
APPEARANCE UR: ABNORMAL
BACTERIA #/AREA URNS AUTO: ABNORMAL /HPF
BILIRUB UR QL STRIP.AUTO: NEGATIVE
COLOR UR AUTO: YELLOW
GLUCOSE UR QL STRIP.AUTO: >=1000
KETONES UR QL STRIP.AUTO: NEGATIVE
LEUKOCYTE ESTERASE UR QL STRIP.AUTO: ABNORMAL
NITRITE UR QL STRIP.AUTO: NEGATIVE
PH UR STRIP.AUTO: 6.5 [PH]
PROT UR QL STRIP.AUTO: ABNORMAL
RBC #/AREA URNS AUTO: ABNORMAL /HPF
RBC UR QL AUTO: ABNORMAL
SP GR UR STRIP.AUTO: 1.01 (ref 1–1.03)
SQUAMOUS #/AREA URNS AUTO: ABNORMAL /HPF
UROBILINOGEN UR STRIP-ACNC: 1
WBC #/AREA URNS AUTO: ABNORMAL /HPF

## 2024-02-16 PROCEDURE — 99285 EMERGENCY DEPT VISIT HI MDM: CPT

## 2024-02-16 PROCEDURE — 87086 URINE CULTURE/COLONY COUNT: CPT | Performed by: EMERGENCY MEDICINE

## 2024-02-16 PROCEDURE — 81003 URINALYSIS AUTO W/O SCOPE: CPT | Performed by: EMERGENCY MEDICINE

## 2024-02-16 PROCEDURE — 82962 GLUCOSE BLOOD TEST: CPT

## 2024-02-16 RX ORDER — NITROFURANTOIN 25; 75 MG/1; MG/1
100 CAPSULE ORAL 2 TIMES DAILY
Qty: 10 CAPSULE | Refills: 0 | Status: SHIPPED | OUTPATIENT
Start: 2024-02-16 | End: 2024-02-21

## 2024-02-16 RX ORDER — PHENAZOPYRIDINE HYDROCHLORIDE 200 MG/1
200 TABLET, FILM COATED ORAL 3 TIMES DAILY
Qty: 6 TABLET | Refills: 0 | Status: SHIPPED | OUTPATIENT
Start: 2024-02-16 | End: 2024-02-26

## 2024-02-16 NOTE — ED PROVIDER NOTES
Encounter Date: 2024       History     Chief Complaint   Patient presents with    Urinary Frequency     Frequent urination with foul odor ongoing for 2 weeks.      This 58-year-old female presents with complaints of frequent urination with foul odor which has been ongoing for the past 2 weeks.  She states she has frequent urinary tract infections and has been hoping for referral to Urology but she has not gotten it.  As a result she is going to switch her primary care doctor to Amy Kolb.       Review of patient's allergies indicates:   Allergen Reactions    Atorvastatin Swelling     FEET SWELLING    Rosuvastatin Swelling     FEET SWELLING    Coenzyme q10     Pioglitazone     Buprenorphine Rash     Other reaction(s): burn    Metformin Diarrhea     Past Medical History:   Diagnosis Date    Acute pulmonary embolus     De Quervain's tenosynovitis     Deep vein thrombosis     Diabetes mellitus, type 2     Diabetic neuropathy     Fibroid uterus     Hyperlipidemia     Hypertension     Lumbar back pain with radiculopathy affecting lower extremity     Lumbar spondylitis     Personal history of colonic polyps     Stroke     Vitamin D deficiency      Past Surgical History:   Procedure Laterality Date    BACK SURGERY      CHOLECYSTECTOMY  2002    Collarbone SX      COLONOSCOPY  2022    FRACTURE SURGERY  2009    Total Hip Replacenwnt    HIP FRACTURE SURGERY      JOINT REPLACEMENT      Hop Replacenent    Left Femur Sx      NECK SURGERY      REPAIR OF MENISCUS OF KNEE      SPINE SURGERY  2022    Back Surgery     Family History   Problem Relation Age of Onset    Coronary artery disease Father     Diabetes Father     Coronary artery disease Mother     COPD Mother             Heart disease Sister      Social History     Tobacco Use    Smoking status: Former     Types: Cigarettes    Smokeless tobacco: Never   Substance Use Topics    Alcohol use: Not Currently    Drug use: Never     Review of  Systems   Constitutional:  Negative for fever.   HENT:  Negative for sore throat.    Respiratory:  Negative for shortness of breath.    Cardiovascular:  Negative for chest pain.   Gastrointestinal:  Negative for nausea.   Genitourinary:  Positive for dysuria and frequency.   Musculoskeletal:  Negative for back pain.   Skin:  Negative for rash.   Neurological:  Negative for weakness.   Hematological:  Does not bruise/bleed easily.       Physical Exam     Initial Vitals [02/16/24 0928]   BP Pulse Resp Temp SpO2   (!) 145/76 92 20 97.6 °F (36.4 °C) 97 %      MAP       --         Physical Exam    Nursing note and vitals reviewed.  Constitutional: She appears well-developed and well-nourished.   HENT:   Head: Normocephalic and atraumatic.   Eyes: Conjunctivae and EOM are normal. Pupils are equal, round, and reactive to light.   Neck: Neck supple.   Normal range of motion.  Cardiovascular:  Normal rate, regular rhythm, normal heart sounds and intact distal pulses.           Pulmonary/Chest: Breath sounds normal.   Abdominal: Abdomen is soft. Bowel sounds are normal.   Musculoskeletal:         General: Normal range of motion.      Cervical back: Normal range of motion and neck supple.     Neurological: She is alert and oriented to person, place, and time. She has normal strength.   Skin: Skin is warm and dry. Capillary refill takes less than 2 seconds.   Psychiatric: She has a normal mood and affect. Her behavior is normal. Judgment and thought content normal.         ED Course   Procedures  Labs Reviewed   URINALYSIS, REFLEX TO URINE CULTURE - Abnormal; Notable for the following components:       Result Value    Appearance, UA Turbid (*)     Protein, UA Trace (*)     Glucose, UA >=1000 (*)     Blood, UA Small (*)     Leukocyte Esterase, UA Moderate (*)     All other components within normal limits   URINALYSIS, MICROSCOPIC - Abnormal; Notable for the following components:    Bacteria, UA Moderate (*)     WBC, UA 11-20 (*)      All other components within normal limits   CULTURE, URINE   POCT GLUCOSE, HAND-HELD DEVICE          Imaging Results    None          Medications - No data to display  Medical Decision Making  Amount and/or Complexity of Data Reviewed  Labs: ordered.    Risk  Prescription drug management.                                      Clinical Impression:  Final diagnoses:  [N30.00] Acute cystitis without hematuria (Primary)          ED Disposition Condition    Discharge Stable          ED Prescriptions       Medication Sig Dispense Start Date End Date Auth. Provider    nitrofurantoin, macrocrystal-monohydrate, (MACROBID) 100 MG capsule Take 1 capsule (100 mg total) by mouth 2 (two) times daily. for 5 days 10 capsule 2/16/2024 2/21/2024 Rui Hollins MD    phenazopyridine (PYRIDIUM) 200 MG tablet Take 1 tablet (200 mg total) by mouth 3 (three) times daily. for 10 days 6 tablet 2/16/2024 2/26/2024 Rui Hollins MD          Follow-up Information       Follow up With Specialties Details Why Contact Info    Kriss Kolb[facility]  Keep appointment as scheduled. [address] [facility], [place] [address]  [phone]               Rui Hollins MD  02/16/24 1042       Rui Hollins MD  02/16/24 1040

## 2024-02-18 LAB
BACTERIA UR CULT: ABNORMAL
POCT GLUCOSE: 203 MG/DL (ref 70–110)

## 2024-03-21 ENCOUNTER — OFFICE VISIT (OUTPATIENT)
Dept: FAMILY MEDICINE | Facility: CLINIC | Age: 59
End: 2024-03-21
Payer: MEDICARE

## 2024-03-21 VITALS
OXYGEN SATURATION: 97 % | TEMPERATURE: 98 F | SYSTOLIC BLOOD PRESSURE: 135 MMHG | HEART RATE: 70 BPM | BODY MASS INDEX: 35.01 KG/M2 | WEIGHT: 210.13 LBS | RESPIRATION RATE: 18 BRPM | DIASTOLIC BLOOD PRESSURE: 78 MMHG | HEIGHT: 65 IN

## 2024-03-21 DIAGNOSIS — E11.65 TYPE 2 DIABETES MELLITUS WITH HYPERGLYCEMIA, UNSPECIFIED WHETHER LONG TERM INSULIN USE: ICD-10-CM

## 2024-03-21 DIAGNOSIS — E66.9 CLASS 1 OBESITY WITH BODY MASS INDEX (BMI) OF 34.0 TO 34.9 IN ADULT, UNSPECIFIED OBESITY TYPE, UNSPECIFIED WHETHER SERIOUS COMORBIDITY PRESENT: ICD-10-CM

## 2024-03-21 DIAGNOSIS — Z76.89 ENCOUNTER TO ESTABLISH CARE: Primary | ICD-10-CM

## 2024-03-21 PROCEDURE — 3052F HG A1C>EQUAL 8.0%<EQUAL 9.0%: CPT | Mod: ,,, | Performed by: NURSE PRACTITIONER

## 2024-03-21 PROCEDURE — 4010F ACE/ARB THERAPY RXD/TAKEN: CPT | Mod: ,,, | Performed by: NURSE PRACTITIONER

## 2024-03-21 PROCEDURE — 3078F DIAST BP <80 MM HG: CPT | Mod: ,,, | Performed by: NURSE PRACTITIONER

## 2024-03-21 PROCEDURE — 3060F POS MICROALBUMINURIA REV: CPT | Mod: ,,, | Performed by: NURSE PRACTITIONER

## 2024-03-21 PROCEDURE — 3066F NEPHROPATHY DOC TX: CPT | Mod: ,,, | Performed by: NURSE PRACTITIONER

## 2024-03-21 PROCEDURE — 1159F MED LIST DOCD IN RCRD: CPT | Mod: ,,, | Performed by: NURSE PRACTITIONER

## 2024-03-21 PROCEDURE — 3075F SYST BP GE 130 - 139MM HG: CPT | Mod: ,,, | Performed by: NURSE PRACTITIONER

## 2024-03-21 PROCEDURE — 3008F BODY MASS INDEX DOCD: CPT | Mod: ,,, | Performed by: NURSE PRACTITIONER

## 2024-03-21 PROCEDURE — 1160F RVW MEDS BY RX/DR IN RCRD: CPT | Mod: ,,, | Performed by: NURSE PRACTITIONER

## 2024-03-21 PROCEDURE — 99214 OFFICE O/P EST MOD 30 MIN: CPT | Mod: ,,, | Performed by: NURSE PRACTITIONER

## 2024-03-21 NOTE — PATIENT INSTRUCTIONS
Rupesh Romero,     If you are due for any health screening(s) below please notify me so we can arrange them to be ordered and scheduled. Most healthy patients at your age complete them, but you are free to accept or refuse.     If you can't do it, I'll definitely understand. If you can, I'd certainly appreciate it!    Tests to Keep You Healthy    Mammogram: Met on 12/14/2023  Colon Cancer Screening: Met on 4/11/2022  Cervical Cancer Screening: DUE  Last Blood Pressure <= 139/89 (3/21/2024): NO      Your cervical cancer screening is due     Our records indicate that you may be overdue for your screening Pap smear. A Pap smear is an important health screening that can detect abnormal cells that can become cervical cancer. Cervical cancer screenings allow for early diagnosis and increase the likelihood of successful treatment.     The current recommendation for Pap smear screening is every 3-5 years for women at average risk. We encourage you to schedule your appointment with your [facility] provider. Many women see a gynecologist for this screening, but some primary care providers also provide Pap screening.     If you recently had your Pap smear screening performed outside of [facility], please let your health care team know so that they can update your health record.      Lets manage your A1c levels     Your last hemoglobin A1c was higher than the goal of less than 8. Hemoglobin A1c or HbA1c is a blood test that measures your average blood sugar levels over the past 3 months. It is the main test to help you and your health care team manage your diabetes.     Higher A1c levels are linked to diabetes complications, such as loss of vision, kidney disease, and nerve damage. Keeping your A1c at least less than 8 is important to stay healthy and we are here to help. Talk with your provider on how you can further improve your A1c.     We recommend that you set up blood work to get a repeat hemoglobin A1c in 3  months to monitor your diabetes. Let your health care team know if you have questions.

## 2024-03-27 PROBLEM — Z76.89 ENCOUNTER TO ESTABLISH CARE: Status: ACTIVE | Noted: 2024-03-27

## 2024-03-28 NOTE — PROGRESS NOTES
Patient ID: [id number]     Chief Complaint: Establish Care      HPI:     Heather Arteaga is a 58 y.o. female here today to establish care,    Past Medical History:  has a past medical history of Acute pulmonary embolus, De Quervain's tenosynovitis, Deep vein thrombosis, Diabetes mellitus, type 2, Diabetic neuropathy, Fibroid uterus, Hyperlipidemia, Hypertension, Lumbar back pain with radiculopathy affecting lower extremity, Lumbar spondylitis, Personal history of colonic polyps, Stroke, and Vitamin D deficiency.    Surgical History:  has a past surgical history that includes Left Femur Sx; Collarbone SX; Repair of meniscus of knee; Neck surgery; Back surgery; Hip fracture surgery; Fracture surgery (2009); Spine surgery (2022); Cholecystectomy (); Joint replacement (); and Colonoscopy (2022).    Family History: family history includes COPD in her mother; Coronary artery disease in her father and mother; Diabetes in her father; Heart disease in her sister.    Social History:  reports that she has quit smoking. Her smoking use included cigarettes. She has never used smokeless tobacco. She reports that she does not currently use alcohol. She reports that she does not use drugs.    Current Outpatient Medications   Medication Instructions    aspirin 81 mg, Oral, Daily    chlorthalidone (HYGROTEN) 25 mg, Oral, Daily    ezetimibe (ZETIA) 10 mg, Oral, Every morning    gabapentin (NEURONTIN) 600 MG tablet 1 tablet, Oral, 3 times daily    HYDROcodone-acetaminophen (NORCO)  mg per tablet 1 tablet, Oral, Every 4 hours PRN    JARDIANCE 25 mg, Oral, Daily    levothyroxine (SYNTHROID) 50 mcg, Oral, Before breakfast    lisinopriL (PRINIVIL,ZESTRIL) 2.5 mg, Oral    MOVANTIK 25 mg, Oral, Daily    naloxone (NARCAN) 4 mg/actuation Spry SMARTSI Spray(s) Both Nares    OZEMPIC 2 mg, Subcutaneous, Every 7 days    REPATHA PUSHTRONEX 420 mg/3.5 mL Injt inject 3.5 mls sub-q once per month    tiZANidine  "(ZANAFLEX) 4 mg, Oral, 3 times daily PRN    venlafaxine (EFFEXOR-XR) 37.5 mg, Oral, Daily       Patient is allergic to atorvastatin, rosuvastatin, coenzyme q10, pioglitazone, buprenorphine, and metformin.     Patient Care Team:  Kriss Kolb FNP as PCP - General (Family Medicine)       Subjective:     Review of Systems    12 point review of systems conducted, negative except as stated in the history of present illness. See HPI for details.      Objective:     Visit Vitals  /78 (BP Location: Right arm, Patient Position: Sitting)   Pulse 70   Temp 97.8 °F (36.6 °C) (Oral)   Resp 18   Ht 5' 5" (1.651 m)   Wt 95.3 kg (210 lb 1.6 oz)   SpO2 97%   BMI 34.96 kg/m²       Physical Exam    General: Alert and oriented, No acute distress.  Head: Normocephalic, Atraumatic.  Eye: Pupils are equal, round and reactive to light, Extraocular movements are intact, Sclera non-icteric.  Ears/Nose/Throat: Normal, Mucosa moist,Clear.  Neck/Thyroid: Supple, Non-tender, No carotid bruit, No lymphadenopathy, No JVD, Full range of motion.  Respiratory: Clear to auscultation bilaterally; No wheezes, rales or rhonchi,Non-labored respirations, Symmetrical chest wall expansion.  Cardiovascular: Regular rate and rhythm, S1/S2 normal, No murmurs, rubs or gallops.  Gastrointestinal: Soft, Non-tender, Non-distended, Normal bowel sounds, No palpable organomegaly.  Musculoskeletal: Normal range of motion.  Integumentary: Warm, Dry, Intact, No suspicious lesions or rashes.  Extremities: No clubbing, cyanosis or edema  Neurologic: No focal deficits, Cranial Nerves II-XII are grossly intact, Motor strength normal upper and lower extremities, Sensory exam intact.  Psychiatric: Normal interaction, Coherent speech, Euthymic mood, Appropriate affect     Labs Reviewed:     Chemistry:  Lab Results   Component Value Date     02/07/2024    K 4.4 02/07/2024    CHLORIDE 104 02/07/2024    BUN 9.0 (L) 02/07/2024    CREATININE 0.63 02/07/2024    " EGFRNORACEVR >60 02/07/2024    GLUCOSE 124 (H) 02/07/2024    CALCIUM 9.6 02/07/2024    ALKPHOS 91 02/07/2024    LABPROT 6.8 02/07/2024    ALBUMIN 3.8 02/07/2024    BILIDIR 0.2 04/07/2022    IBILI 0.20 04/07/2022    AST 16 02/07/2024    ALT 20 02/07/2024    MG 1.8 03/15/2018    EWDKMWHD92XB 89.3 (H) 07/24/2023    TSH 2.341 02/03/2023    KBMHSX4NPOE 0.97 02/03/2023        Lab Results   Component Value Date    HGBA1C 8.4 (H) 02/07/2024        Hematology:  Lab Results   Component Value Date    WBC 7.83 02/07/2024    HGB 12.3 02/07/2024    HCT 40.0 02/07/2024     02/07/2024       Lipid Panel:  Lab Results   Component Value Date    CHOL 180 02/07/2024    HDL 79 (H) 02/07/2024    LDL 89.00 02/07/2024    TRIG 58 02/07/2024    TOTALCHOLEST 2 02/07/2024        Urine:  Lab Results   Component Value Date    COLORUA Yellow 02/16/2024    APPEARANCEUA Turbid (A) 02/16/2024    SGUA 1.015 02/16/2024    PHUA 6.5 02/16/2024    PROTEINUA Trace (A) 02/16/2024    GLUCOSEUA >=1000 (A) 02/16/2024    KETONESUA Negative 02/16/2024    BLOODUA Small (A) 02/16/2024    NITRITESUA Negative 02/16/2024    LEUKOCYTESUR Moderate (A) 02/16/2024    RBCUA 3-5 02/16/2024    WBCUA 11-20 (A) 02/16/2024    BACTERIA Moderate (A) 02/16/2024    SQEPUA Few (A) 10/19/2023    HYALINECASTS 0-2 (A) 10/19/2023    CREATRANDUR 146.7 (H) 02/07/2024          Assessment:       ICD-10-CM ICD-9-CM   1. Encounter to establish care  Z76.89 V65.8   2. Type 2 diabetes mellitus with hyperglycemia, unspecified whether long term insulin use  E11.65 250.00   3. Class 1 obesity with body mass index (BMI) of 34.0 to 34.9 in adult, unspecified obesity type, unspecified whether serious comorbidity present  E66.9 278.00    Z68.34 V85.34        Plan:   1. Encounter to establish care    2. Type 2 diabetes mellitus with hyperglycemia, unspecified whether long term insulin use  Lab Results   Component Value Date    HGBA1C 8.4 (H) 02/07/2024    HGBA1C 6.4 07/24/2023    LDL 89.00  02/07/2024    CREATININE 0.63 02/07/2024      Continue   Diabetes Medications               empagliflozin (JARDIANCE) 25 mg tablet Take 25 mg by mouth once daily.    OZEMPIC 2 mg/dose (8 mg/3 mL) PnIj Inject 2 mg into the skin every 7 days.            Follow ADA Diet. Avoid soda, simple sweets, and limit rice/pasta/breads/starches (no more than 45-50 grams per meal).  Maintain healthy weight with goal BMI <30.  Exercise 5 times per week for 30 minutes per day.  Stressed importance of daily foot exams.  Stressed importance of annual dilated eye exam.        - Hemoglobin A1C; Future  - Comprehensive Metabolic Panel; Future  - Lipid Panel; Future  - Microalbumin/Creatinine Ratio, Urine; Future    3. Class 1 obesity with body mass index (BMI) of 34.0 to 34.9 in adult, unspecified obesity type, unspecified whether serious comorbidity present  Body mass index is 34.96 kg/m².  Goal BMI <30.  Exercise 5 times a week for 30 minutes per day.  Avoid soda, simple sugars, excessive rice, potatoes or bread. Limit fast foods and fried foods.  Choose complex carbs in moderation (example: green vegetables, beans, oatmeal). Eat plenty of fresh fruits and vegetables with lean meats daily.  Do not skip meals. Eat a balanced portion size.  Avoid fad diets. Consider permanent healthy life style changes.         Follow up in about 2 months (around 5/21/2024) for DM. In addition to their scheduled follow up, the patient has also been instructed to follow up on as needed basis.     Future Appointments   Date Time Provider Department Center   4/15/2024  9:30 AM Damien Rangel MD [facility] [place]   5/10/2024  9:00 AM Refugio Lou MD [facility] [address] [place]   5/22/2024  1:30 PM Kriss Kolb FNP [facility]        KHAI Zambrano

## 2024-04-15 ENCOUNTER — OFFICE VISIT (OUTPATIENT)
Dept: ORTHOPEDICS | Facility: CLINIC | Age: 59
End: 2024-04-15
Payer: MEDICARE

## 2024-04-15 ENCOUNTER — ANESTHESIA EVENT (OUTPATIENT)
Dept: SURGERY | Facility: HOSPITAL | Age: 59
End: 2024-04-15
Payer: MEDICARE

## 2024-04-15 ENCOUNTER — HOSPITAL ENCOUNTER (OUTPATIENT)
Dept: RADIOLOGY | Facility: HOSPITAL | Age: 59
Discharge: HOME | End: 2024-04-15
Attending: REHABILITATION UNIT
Payer: MEDICARE

## 2024-04-15 VITALS
BODY MASS INDEX: 34.32 KG/M2 | SYSTOLIC BLOOD PRESSURE: 150 MMHG | HEIGHT: 65 IN | HEART RATE: 70 BPM | DIASTOLIC BLOOD PRESSURE: 80 MMHG | WEIGHT: 206 LBS

## 2024-04-15 DIAGNOSIS — G56.02 CARPAL TUNNEL SYNDROME OF LEFT WRIST: Primary | ICD-10-CM

## 2024-04-15 DIAGNOSIS — Z01.818 PREOP TESTING: ICD-10-CM

## 2024-04-15 PROCEDURE — 3052F HG A1C>EQUAL 8.0%<EQUAL 9.0%: CPT | Mod: CPTII,,, | Performed by: REHABILITATION UNIT

## 2024-04-15 PROCEDURE — 3008F BODY MASS INDEX DOCD: CPT | Mod: CPTII,,, | Performed by: REHABILITATION UNIT

## 2024-04-15 PROCEDURE — 3077F SYST BP >= 140 MM HG: CPT | Mod: CPTII,,, | Performed by: REHABILITATION UNIT

## 2024-04-15 PROCEDURE — 3079F DIAST BP 80-89 MM HG: CPT | Mod: CPTII,,, | Performed by: REHABILITATION UNIT

## 2024-04-15 PROCEDURE — 4010F ACE/ARB THERAPY RXD/TAKEN: CPT | Mod: CPTII,,, | Performed by: REHABILITATION UNIT

## 2024-04-15 PROCEDURE — 3060F POS MICROALBUMINURIA REV: CPT | Mod: CPTII,,, | Performed by: REHABILITATION UNIT

## 2024-04-15 PROCEDURE — 3066F NEPHROPATHY DOC TX: CPT | Mod: CPTII,,, | Performed by: REHABILITATION UNIT

## 2024-04-15 PROCEDURE — 99214 OFFICE O/P EST MOD 30 MIN: CPT | Mod: ,,, | Performed by: REHABILITATION UNIT

## 2024-04-15 PROCEDURE — 71045 X-RAY EXAM CHEST 1 VIEW: CPT | Mod: TC

## 2024-04-15 RX ORDER — BUTALB/ACETAMINOPHEN/CAFFEINE 50-325-40
1 TABLET ORAL DAILY
COMMUNITY

## 2024-04-15 NOTE — H&P (VIEW-ONLY)
Subjective:      Patient ID: Heather Arteaga is a 58 y.o. female.    Chief Complaint: Pain of the Left Hand (RUT hand pain carpal tunnel ref. By Dr. Salomon. She could not close her hand and sharp shooting pain and fingers are numb and tingling left is worse then right.)    HPI:   Heather Arteaga is a 58 y.o. female who presents today for initial evaluation of her bilateral hands    History of Present Illness  The patient presents for evaluation of bilateral carpal tunnel syndrome.    The patient was last evaluated by Dr. Salomon approximately a year ago and has been diagnosed with bilateral carpal tunnel syndrome, with the left side being more symptomatic than the right. Over the past month, the patient's condition has progressively worsened, characterized by shooting pain, morning difficulty in closing his hands, and a sensation of burning and tingling in three of his fingers. Despite attempts at self-treatment with bracing and nerve testing, the symptoms persist. The patient is right-handed and continues to utilize braces at night. She has previously received injections from Dr. Salomon and continues to engage in hand exercises for de Quervain's tenosynovitis.  She is asymptomatic from these today and has not had any issues in quite some time.  The patient expresses readiness for surgical intervention.     Supplemental Information  She is a diabetic.  She is currently on Ozempic injections.  Her A1c increased to 8.4 most recently but was previously 6.4.  He has been working to reduce this.    Past Medical History:   Diagnosis Date    Acute pulmonary embolus     Carpal tunnel syndrome     De Quervain's tenosynovitis     Deep vein thrombosis     Diabetes mellitus, type 2     Diabetic neuropathy     Fibroid uterus     Hyperlipidemia     Hypertension     Lumbar back pain with radiculopathy affecting lower extremity     Lumbar spondylitis     Personal history of colonic polyps     Vitamin D deficiency      Past Surgical  History:   Procedure Laterality Date    BACK SURGERY      CHOLECYSTECTOMY  2002    Collarbone SX      COLONOSCOPY  2022    FRACTURE SURGERY Right 2009    Total Hip Replacenwnt    gastric sleeve      Left Femur Sx      NECK SURGERY      ORIF HIP FRACTURE Right     REPAIR OF MENISCUS OF KNEE      SPINE SURGERY  2022    Back Surgery    TONSILLECTOMY       Social History     Socioeconomic History    Marital status: Single   Occupational History     Comment: disabled   Tobacco Use    Smoking status: Former     Types: Cigarettes    Smokeless tobacco: Never   Substance and Sexual Activity    Alcohol use: Not Currently    Drug use: Never    Sexual activity: Yes     Partners: Male     Birth control/protection: None         Current Outpatient Medications:     aspirin 81 mg Cap, Take 81 mg by mouth once daily., Disp: , Rfl:     chlorthalidone (HYGROTEN) 25 MG Tab, Take 25 mg by mouth once daily., Disp: , Rfl:     empagliflozin (JARDIANCE) 25 mg tablet, Take 25 mg by mouth once daily., Disp: , Rfl:     ezetimibe (ZETIA) 10 mg tablet, Take 10 mg by mouth every morning., Disp: , Rfl:     gabapentin (NEURONTIN) 600 MG tablet, Take 1 tablet by mouth as needed., Disp: , Rfl:     HYDROcodone-acetaminophen (NORCO)  mg per tablet, Take 1 tablet by mouth every 4 (four) hours as needed., Disp: , Rfl:     lisinopriL (PRINIVIL,ZESTRIL) 2.5 MG tablet, Take 2.5 mg by mouth once daily., Disp: , Rfl:     MOVANTIK 25 mg tablet, Take 25 mg by mouth as needed., Disp: , Rfl:     naloxone (NARCAN) 4 mg/actuation Alison, SMARTSI Spray(s) Both Nares, Disp: , Rfl:     OZEMPIC 2 mg/dose (8 mg/3 mL) PnIj, Inject 2 mg into the skin every 7 days., Disp: , Rfl:     REPATHA PUSHTRONEX 420 mg/3.5 mL Injt, inject 3.5 mls sub-q once per month, Disp: 3.5 mL, Rfl: 5    tizanidine (ZANAFLEX) 4 MG tablet, Take 4 mg by mouth 3 (three) times daily as needed., Disp: , Rfl:     venlafaxine (EFFEXOR-XR) 37.5 MG 24 hr capsule, Take 1 capsule (37.5  "mg total) by mouth once daily., Disp: 30 capsule, Rfl: 11    calcium citrate-vitamin D3 315-200 mg (CITRACAL+D) 315 mg-5 mcg (200 unit) per tablet, Take 1 tablet by mouth once daily., Disp: , Rfl:     levothyroxine (SYNTHROID) 50 MCG tablet, Take 1 tablet (50 mcg total) by mouth before breakfast., Disp: 30 tablet, Rfl: 11  Review of patient's allergies indicates:   Allergen Reactions    Atorvastatin Swelling     FEET SWELLING    Rosuvastatin Swelling     FEET SWELLING    Coenzyme q10     Pioglitazone     Buprenorphine Rash     Other reaction(s): burn    Metformin Diarrhea       BP (!) 150/80 (BP Location: Right arm, Patient Position: Sitting, BP Method: Medium (Automatic))   Pulse 70   Ht 5' 5" (1.651 m)   Wt 93.4 kg (206 lb)   BMI 34.28 kg/m²     Comprehensive review of systems completed and negative except as per HPI.        Objective:   Head: Normocephalic, without obvious abnormality, atraumatic  Eyes: conjunctivae/corneas clear. EOM's intact  Ears: normal external appearance  Nose: Nares normal. Septum midline. Mucosa normal. No drainage  Throat: normal findings: lips normal without lesions  Lungs: unlabored breathing on room air  Chest wall: symmetric chest rise  Heart: regular rate and rhythm  Pulses: 2+ and symmetric  Skin: Skin color, texture, turgor normal. No rashes or lesions  Neurologic: Grossly normal    LUE    Left wrist:  Skin is intact with no lesions.  There is some atrophy within the thenar eminence.  No tenderness to palpation over the 1st dorsal compartment. Finkelstein's test is negative. Full range of motion of the wrist hand and digits. There is no Tinel's over the superficial branch of the radial nerve. No tenderness to palpation over the intersection of the 1st and 2nd dorsal compartments. Tinel is positive over the median nerve at the wrist. Phalen's and Durkan's are positive. Apb strength is 5/5. No Tinel over the ulnar nerve over the medial epicondyle. Flexion and compression of the " elbow is negative. Radial pulse 2 +hand is warm well perfused     Assessment:     Imaging:   Left-hand films from 01/09/2023 reviewed with no bony abnormality      1. Carpal tunnel syndrome of left wrist    2. Preop testing          Plan:       Orders Placed This Encounter    X-Ray Chest 1 View    Basic Metabolic Panel    CBC auto differential    EKG 12-lead    Case Request Operating Room - [facility]: RELEASE, CARPAL TUNNEL     Assessment & Plan  1. Bilateral carpal tunnel syndrome.  Imaging and exam findings discussed.  Patient has longstanding history of bilateral carpal tunnel syndrome.  Her left wrist is more symptomatic.  She has had an EMG and has tried nonoperative measures to include bracing, medications, and avoiding aggravating activities.  She has had progression of her pain and symptoms and this is interfering with her activities of daily living.  A comprehensive discussion was held with the patient. We discussed operative and nonoperative options. The patient had an opportunity to ask questions. All questions were answered. The risks and benefits of surgery were discussed with the patient, including but not limited to bleeding, infection, damage to surrounding nerves and structures, need for further surgery, anesthesia risk, progression of arthritis, blood clots, persistent pain and weakness, and medical risks of surgery. The patient voiced understanding of the risks and benefits and provided written consent to the procedure. Plan for left open carpal tunnel release. Patient will be scheduled for surgery at a mutually convenient date in the near future - 4/25.  Discussed the anticipated surgery and recovery course.  We will plan to go forward with right carpal tunnel release after she is recovered from her left side.  She is going to work on her blood glucose control in the interim as I did discuss that elevated levels increase her risk of infection and wound healing complications.  She is asymptomatic from  her de Quervain and has been like this for quite some time so we will not address this surgically.  All of her questions were answered and she was in agreement.

## 2024-04-15 NOTE — LETTER
April 15, 2024    Heather Arteaga  [address] [facility] [address]              [facility]  [address], [address] [address] [place] [address]  Phone: [phone]  Fax: [phone] 1965    Dr. Danny Jackson,         The above named patient is scheduled to have a Left carpal tunnel release    On 04/25/2024.       *Type of Anesthesia: General     This patient needs surgical clearance from your office for this procedure.  Please perform necessary tests in order for this patient to be medically cleared for surgery. Please send or fax the clearance letter with most recent ECHO, EKG or stress test, to our office as soon as possible.     Request to Hold  Aspirin 81MG for  7 days prior to procedure.    Our fax number is [phone].            We appreciate your help in getting our patient cleared for surgery.  Please feel free to call our office should you have any questions, [phone].           Damien Rangel MD

## 2024-04-16 RX ORDER — SODIUM CHLORIDE, SODIUM GLUCONATE, SODIUM ACETATE, POTASSIUM CHLORIDE AND MAGNESIUM CHLORIDE 30; 37; 368; 526; 502 MG/100ML; MG/100ML; MG/100ML; MG/100ML; MG/100ML
INJECTION, SOLUTION INTRAVENOUS CONTINUOUS
Status: CANCELLED | OUTPATIENT
Start: 2024-04-16

## 2024-04-25 ENCOUNTER — HOSPITAL ENCOUNTER (OUTPATIENT)
Facility: HOSPITAL | Age: 59
Discharge: HOME | End: 2024-04-25
Attending: REHABILITATION UNIT | Admitting: REHABILITATION UNIT
Payer: MEDICARE

## 2024-04-25 ENCOUNTER — ANESTHESIA (OUTPATIENT)
Dept: SURGERY | Facility: HOSPITAL | Age: 59
End: 2024-04-25
Payer: MEDICARE

## 2024-04-25 DIAGNOSIS — G56.02 CARPAL TUNNEL SYNDROME OF LEFT WRIST: ICD-10-CM

## 2024-04-25 LAB
POCT GLUCOSE: 100 MG/DL (ref 70–110)
POCT GLUCOSE: 106 MG/DL (ref 70–110)

## 2024-04-25 PROCEDURE — 82962 GLUCOSE BLOOD TEST: CPT | Performed by: REHABILITATION UNIT

## 2024-04-25 PROCEDURE — D9220A PRA ANESTHESIA: Mod: CRNA,,, | Performed by: NURSE ANESTHETIST, CERTIFIED REGISTERED

## 2024-04-25 PROCEDURE — 36000707: Performed by: REHABILITATION UNIT

## 2024-04-25 PROCEDURE — 71000015 HC POSTOP RECOV 1ST HR: Performed by: REHABILITATION UNIT

## 2024-04-25 PROCEDURE — 25000003 PHARM REV CODE 250: Performed by: REHABILITATION UNIT

## 2024-04-25 PROCEDURE — 63600175 PHARM REV CODE 636 W HCPCS: Performed by: REHABILITATION UNIT

## 2024-04-25 PROCEDURE — 37000008 HC ANESTHESIA 1ST 15 MINUTES: Performed by: REHABILITATION UNIT

## 2024-04-25 PROCEDURE — 25000003 PHARM REV CODE 250: Performed by: NURSE ANESTHETIST, CERTIFIED REGISTERED

## 2024-04-25 PROCEDURE — 37000009 HC ANESTHESIA EA ADD 15 MINS: Performed by: REHABILITATION UNIT

## 2024-04-25 PROCEDURE — 71000033 HC RECOVERY, INTIAL HOUR: Performed by: REHABILITATION UNIT

## 2024-04-25 PROCEDURE — 63600175 PHARM REV CODE 636 W HCPCS: Performed by: NURSE ANESTHETIST, CERTIFIED REGISTERED

## 2024-04-25 PROCEDURE — 64721 CARPAL TUNNEL SURGERY: CPT | Mod: LT,,, | Performed by: REHABILITATION UNIT

## 2024-04-25 PROCEDURE — 63600175 PHARM REV CODE 636 W HCPCS: Performed by: ANESTHESIOLOGY

## 2024-04-25 PROCEDURE — 36000706: Performed by: REHABILITATION UNIT

## 2024-04-25 PROCEDURE — 99499 UNLISTED E&M SERVICE: CPT | Mod: ,,, | Performed by: NURSE PRACTITIONER

## 2024-04-25 PROCEDURE — 64415 NJX AA&/STRD BRCH PLXS IMG: CPT | Mod: 59,LT | Performed by: ANESTHESIOLOGY

## 2024-04-25 PROCEDURE — 71000016 HC POSTOP RECOV ADDL HR: Performed by: REHABILITATION UNIT

## 2024-04-25 PROCEDURE — D9220A PRA ANESTHESIA: Mod: ANES,,, | Performed by: ANESTHESIOLOGY

## 2024-04-25 PROCEDURE — 25000003 PHARM REV CODE 250: Performed by: ANESTHESIOLOGY

## 2024-04-25 RX ORDER — ONDANSETRON HYDROCHLORIDE 2 MG/ML
4 INJECTION, SOLUTION INTRAVENOUS EVERY 6 HOURS PRN
Status: DISCONTINUED | OUTPATIENT
Start: 2024-04-25 | End: 2024-04-25 | Stop reason: HOSPADM

## 2024-04-25 RX ORDER — SODIUM CHLORIDE 9 MG/ML
INJECTION, SOLUTION INTRAVENOUS CONTINUOUS
Status: DISCONTINUED | OUTPATIENT
Start: 2024-04-25 | End: 2024-04-25 | Stop reason: HOSPADM

## 2024-04-25 RX ORDER — CELECOXIB 200 MG/1
200 CAPSULE ORAL
Status: COMPLETED | OUTPATIENT
Start: 2024-04-25 | End: 2024-04-25

## 2024-04-25 RX ORDER — OXYCODONE AND ACETAMINOPHEN 5; 325 MG/1; MG/1
1 TABLET ORAL EVERY 6 HOURS PRN
Qty: 10 TABLET | Refills: 0 | Status: SHIPPED | OUTPATIENT
Start: 2024-04-25 | End: 2024-05-22

## 2024-04-25 RX ORDER — MIDAZOLAM HYDROCHLORIDE 2 MG/2ML
.5-4 INJECTION, SOLUTION INTRAMUSCULAR; INTRAVENOUS
Status: DISCONTINUED | OUTPATIENT
Start: 2024-04-25 | End: 2024-04-25 | Stop reason: HOSPADM

## 2024-04-25 RX ORDER — MIDAZOLAM HYDROCHLORIDE 2 MG/2ML
INJECTION, SOLUTION INTRAMUSCULAR; INTRAVENOUS
Status: DISCONTINUED
Start: 2024-04-25 | End: 2024-04-25 | Stop reason: HOSPADM

## 2024-04-25 RX ORDER — DEXAMETHASONE SODIUM PHOSPHATE 4 MG/ML
INJECTION, SOLUTION INTRA-ARTICULAR; INTRALESIONAL; INTRAMUSCULAR; INTRAVENOUS; SOFT TISSUE
Status: DISCONTINUED | OUTPATIENT
Start: 2024-04-25 | End: 2024-04-25

## 2024-04-25 RX ORDER — PROPOFOL 10 MG/ML
VIAL (ML) INTRAVENOUS
Status: DISCONTINUED | OUTPATIENT
Start: 2024-04-25 | End: 2024-04-25

## 2024-04-25 RX ORDER — ONDANSETRON 4 MG/1
4 TABLET, ORALLY DISINTEGRATING ORAL ONCE
Status: COMPLETED | OUTPATIENT
Start: 2024-04-25 | End: 2024-04-25

## 2024-04-25 RX ORDER — HYDROMORPHONE HYDROCHLORIDE 2 MG/ML
0.4 INJECTION, SOLUTION INTRAMUSCULAR; INTRAVENOUS; SUBCUTANEOUS EVERY 5 MIN PRN
Status: DISCONTINUED | OUTPATIENT
Start: 2024-04-25 | End: 2024-04-25 | Stop reason: HOSPADM

## 2024-04-25 RX ORDER — ACETAMINOPHEN 500 MG
1000 TABLET ORAL
Status: COMPLETED | OUTPATIENT
Start: 2024-04-25 | End: 2024-04-25

## 2024-04-25 RX ORDER — FENTANYL CITRATE 50 UG/ML
INJECTION, SOLUTION INTRAMUSCULAR; INTRAVENOUS
Status: DISCONTINUED | OUTPATIENT
Start: 2024-04-25 | End: 2024-04-25

## 2024-04-25 RX ORDER — ONDANSETRON HYDROCHLORIDE 2 MG/ML
INJECTION, SOLUTION INTRAVENOUS
Status: DISCONTINUED | OUTPATIENT
Start: 2024-04-25 | End: 2024-04-25

## 2024-04-25 RX ORDER — HYDROCODONE BITARTRATE AND ACETAMINOPHEN 10; 325 MG/1; MG/1
1 TABLET ORAL ONCE
Status: COMPLETED | OUTPATIENT
Start: 2024-04-25 | End: 2024-04-25

## 2024-04-25 RX ORDER — PHENYLEPHRINE HCL IN 0.9% NACL 1 MG/10 ML
SYRINGE (ML) INTRAVENOUS
Status: DISCONTINUED | OUTPATIENT
Start: 2024-04-25 | End: 2024-04-25

## 2024-04-25 RX ORDER — METOCLOPRAMIDE HYDROCHLORIDE 5 MG/ML
10 INJECTION INTRAMUSCULAR; INTRAVENOUS EVERY 6 HOURS PRN
Status: DISCONTINUED | OUTPATIENT
Start: 2024-04-25 | End: 2024-04-25 | Stop reason: HOSPADM

## 2024-04-25 RX ORDER — HYDROCODONE BITARTRATE AND ACETAMINOPHEN 5; 325 MG/1; MG/1
1 TABLET ORAL EVERY 4 HOURS PRN
Status: DISCONTINUED | OUTPATIENT
Start: 2024-04-25 | End: 2024-04-25

## 2024-04-25 RX ORDER — ALUMINUM HYDROXIDE, MAGNESIUM HYDROXIDE, AND SIMETHICONE 1200; 120; 1200 MG/30ML; MG/30ML; MG/30ML
30 SUSPENSION ORAL EVERY 6 HOURS PRN
Status: DISCONTINUED | OUTPATIENT
Start: 2024-04-25 | End: 2024-04-25 | Stop reason: HOSPADM

## 2024-04-25 RX ORDER — ONDANSETRON HYDROCHLORIDE 2 MG/ML
4 INJECTION, SOLUTION INTRAVENOUS ONCE AS NEEDED
Status: DISCONTINUED | OUTPATIENT
Start: 2024-04-25 | End: 2024-04-25 | Stop reason: HOSPADM

## 2024-04-25 RX ORDER — SODIUM CHLORIDE, SODIUM GLUCONATE, SODIUM ACETATE, POTASSIUM CHLORIDE AND MAGNESIUM CHLORIDE 30; 37; 368; 526; 502 MG/100ML; MG/100ML; MG/100ML; MG/100ML; MG/100ML
INJECTION, SOLUTION INTRAVENOUS CONTINUOUS
Status: DISCONTINUED | OUTPATIENT
Start: 2024-04-25 | End: 2024-04-25 | Stop reason: HOSPADM

## 2024-04-25 RX ORDER — MORPHINE SULFATE 4 MG/ML
4 INJECTION, SOLUTION INTRAMUSCULAR; INTRAVENOUS
Status: DISCONTINUED | OUTPATIENT
Start: 2024-04-25 | End: 2024-04-25 | Stop reason: HOSPADM

## 2024-04-25 RX ORDER — LIDOCAINE HYDROCHLORIDE 10 MG/ML
INJECTION INFILTRATION; PERINEURAL
Status: DISCONTINUED | OUTPATIENT
Start: 2024-04-25 | End: 2024-04-25

## 2024-04-25 RX ORDER — METHOCARBAMOL 500 MG/1
500 TABLET, FILM COATED ORAL EVERY 6 HOURS PRN
Status: DISCONTINUED | OUTPATIENT
Start: 2024-04-25 | End: 2024-04-25 | Stop reason: HOSPADM

## 2024-04-25 RX ORDER — MUPIROCIN 20 MG/G
OINTMENT TOPICAL 2 TIMES DAILY
Status: DISCONTINUED | OUTPATIENT
Start: 2024-04-25 | End: 2024-04-25 | Stop reason: HOSPADM

## 2024-04-25 RX ORDER — GLYCOPYRROLATE 0.2 MG/ML
INJECTION INTRAMUSCULAR; INTRAVENOUS
Status: DISCONTINUED | OUTPATIENT
Start: 2024-04-25 | End: 2024-04-25

## 2024-04-25 RX ORDER — KETOROLAC TROMETHAMINE 10 MG/1
10 TABLET, FILM COATED ORAL EVERY 8 HOURS PRN
Qty: 15 TABLET | Refills: 0 | Status: SHIPPED | OUTPATIENT
Start: 2024-04-25 | End: 2024-04-30

## 2024-04-25 RX ORDER — KETOROLAC TROMETHAMINE 30 MG/ML
INJECTION, SOLUTION INTRAMUSCULAR; INTRAVENOUS
Status: DISCONTINUED | OUTPATIENT
Start: 2024-04-25 | End: 2024-04-25

## 2024-04-25 RX ORDER — FENTANYL CITRATE 50 UG/ML
25-50 INJECTION, SOLUTION INTRAMUSCULAR; INTRAVENOUS
Status: DISCONTINUED | OUTPATIENT
Start: 2024-04-25 | End: 2024-04-25 | Stop reason: HOSPADM

## 2024-04-25 RX ADMIN — CELECOXIB 200 MG: 200 CAPSULE ORAL at 06:04

## 2024-04-25 RX ADMIN — MEPIVACAINE HYDROCHLORIDE 40 ML: 15 INJECTION, SOLUTION EPIDURAL; INFILTRATION at 07:04

## 2024-04-25 RX ADMIN — GLYCOPYRROLATE 0.2 MG: 0.2 INJECTION INTRAMUSCULAR; INTRAVENOUS at 08:04

## 2024-04-25 RX ADMIN — CEFAZOLIN 2 G: 2 INJECTION, POWDER, FOR SOLUTION INTRAMUSCULAR; INTRAVENOUS at 08:04

## 2024-04-25 RX ADMIN — ACETAMINOPHEN 1000 MG: 500 TABLET ORAL at 06:04

## 2024-04-25 RX ADMIN — FENTANYL CITRATE 50 MCG: 50 INJECTION, SOLUTION INTRAMUSCULAR; INTRAVENOUS at 08:04

## 2024-04-25 RX ADMIN — HYDROCODONE BITARTRATE AND ACETAMINOPHEN 1 TABLET: 10; 325 TABLET ORAL at 09:04

## 2024-04-25 RX ADMIN — DEXAMETHASONE SODIUM PHOSPHATE 8 MG: 4 INJECTION, SOLUTION INTRA-ARTICULAR; INTRALESIONAL; INTRAMUSCULAR; INTRAVENOUS; SOFT TISSUE at 07:04

## 2024-04-25 RX ADMIN — MIDAZOLAM HYDROCHLORIDE 2 MG: 1 INJECTION, SOLUTION INTRAMUSCULAR; INTRAVENOUS at 07:04

## 2024-04-25 RX ADMIN — Medication 100 MCG: at 08:04

## 2024-04-25 RX ADMIN — ONDANSETRON 4 MG: 4 TABLET, ORALLY DISINTEGRATING ORAL at 09:04

## 2024-04-25 RX ADMIN — KETOROLAC TROMETHAMINE 15 MG: 30 INJECTION, SOLUTION INTRAMUSCULAR at 08:04

## 2024-04-25 RX ADMIN — SODIUM CHLORIDE, SODIUM GLUCONATE, SODIUM ACETATE, POTASSIUM CHLORIDE AND MAGNESIUM CHLORIDE: 526; 502; 368; 37; 30 INJECTION, SOLUTION INTRAVENOUS at 06:04

## 2024-04-25 RX ADMIN — LIDOCAINE HYDROCHLORIDE 50 MG: 10 INJECTION, SOLUTION INFILTRATION; PERINEURAL at 07:04

## 2024-04-25 RX ADMIN — SODIUM CHLORIDE, SODIUM GLUCONATE, SODIUM ACETATE, POTASSIUM CHLORIDE AND MAGNESIUM CHLORIDE: 526; 502; 368; 37; 30 INJECTION, SOLUTION INTRAVENOUS at 07:04

## 2024-04-25 RX ADMIN — PROPOFOL 150 MG: 10 INJECTION, EMULSION INTRAVENOUS at 07:04

## 2024-04-25 RX ADMIN — ONDANSETRON HYDROCHLORIDE 4 MG: 2 SOLUTION INTRAMUSCULAR; INTRAVENOUS at 07:04

## 2024-04-25 NOTE — DISCHARGE INSTRUCTIONS
Postoperative Instructions       DIET    Begin with clear liquids and light foods (jello, soups, etc.)    Progress to your normal diet if you are not nauseated    WOUND CARE    Maintain your operative dressing clean and dry.     It is normal for there to be bleeding and swelling following surgery. Reinforce with additional dressing as needed.    Change dressing in 72 hours.      Cover the wound daily with a clean, dry dressing. Do not put ointments or creams on the wound. This includes Neosporin and Bacitracin.    MEDICATIONS    Regional nerve block was performed and will be temporary. Patients commonly encounter more pain on the first or second day after surgery when swelling peaks    Medications for pain control were provided. Please take as prescribed.     Use ice device frequently as instructed     Most patients will require some narcotic pain medication for a short period of time - this can be taken as directed on the bottle if needed    Common side effects of pain medication are nausea, drowsiness, and constipation. To decrease the side effects take the medication with food. We recommend a stool softener such as Colace (docusate) available over the counter and be sure to drink plenty of water.    If you are having problems with nausea and vomiting, contact the office to possibly have your medications changed    Do not drive a car or operate machinery while taking the narcotic medication    Please avoid alcohol use while taking narcotic pain medication    ACTIVITY    Non-weightbearing to the operative extremity    No physical therapy at this time    EMERGENCIES    Contact Dr. Meek office at [phone] if any of the following are present:    ·         Painful swelling or numbness (note that some swelling and numbness is normal)    ·         Unrelenting pain or calf pain    ·         Fever (over 101F - it is normal to have a low grade fever (<100F) for the first day or two following surgery) or  chills    ·         Redness around incisions    ·         Color change of fingers    ·         Continuous drainage or bleeding from incision (a small amount of drainage is expected)    ·         Difficulty breathing    ·         Excessive nausea/vomiting    If you have an emergency that requires immediate attention proceed to the nearest emergency room    FOLLOW UP    Your first follow up will be in around 10-14 days.          If you have any further questions please contact Dr. Meek office

## 2024-04-25 NOTE — DISCHARGE SUMMARY
[facility] - Periop Services  Discharge Note  Short Stay    Procedure(s) (LRB):  RELEASE, CARPAL TUNNEL (Left)    OUTCOME: Patient tolerated treatment/procedure well without complication and is now ready for discharge.    DISPOSITION: Home or Self Care    FINAL DIAGNOSIS:  L CTS    FOLLOWUP: In clinic    DISCHARGE INSTRUCTIONS:    Discharge Procedure Orders   Diet general     Activity as tolerated     Keep surgical extremity elevated     Ice to affected area     No driving, operating heavy equipment or signing legal documents while taking pain medication.     Call MD for:  temperature >100.4     Call MD for:  persistent nausea and vomiting     Call MD for:  severe uncontrolled pain     Call MD for:  difficulty breathing, headache or visual disturbances     Call MD for:  redness, tenderness, or signs of infection (pain, swelling, redness, odor or green/yellow discharge around incision site)     Call MD for:  hives     Call MD for:  persistent dizziness or light-headedness     Call MD for:  extreme fatigue     Leave dressing on - Keep it clean, dry, and intact until clinic visit        TIME SPENT ON DISCHARGE: 10 minutes

## 2024-04-25 NOTE — ANESTHESIA PROCEDURE NOTES
Intubation    Date/Time: 4/25/2024 7:55 AM    Performed by: Felipe Bautista CRNA  Authorized by: Shen Holm MD    Intubation:     Induction:  Intravenous    Intubated:  Postinduction    Mask Ventilation:  Easy with oral airway    Attempts:  1    Attempted By:  CRNA    Method of Intubation:  Other (see comments)    Difficult Airway Encountered?: No      Complications:  None    Airway Device:  Supraglottic airway/LMA    Airway Device Size:  4.0    Style/Cuff Inflation:  Cuffed (inflated to minimal occlusive pressure)    Secured at:  The lips    Placement Verified By:  Capnometry    Complicating Factors:  None    Findings Post-Intubation:  BS equal bilateral and atraumatic/condition of teeth unchanged

## 2024-04-25 NOTE — ANESTHESIA PREPROCEDURE EVALUATION
04/24/2024  Heather Arteaga is a 58 y.o., female, who presents for the following:    Procedure: RELEASE, CARPAL TUNNEL (Left: Hand)   Anesthesia type: General   Diagnosis: Carpal tunnel syndrome of left wrist [G56.02]   Pre-op diagnosis: Carpal tunnel syndrome of left wrist [G56.02]   Location: [facility] OR 01 / [facility] OR   Surgeons: Damien Rangel MD     LAB: reviewed  EKG: sinus forest ( 58 bpm)      Pre-op Assessment    I have reviewed the Patient Summary Reports.     I have reviewed the Nursing Notes. I have reviewed the NPO Status.   I have reviewed the Medications.     Review of Systems  Anesthesia Hx:  No problems with previous Anesthesia             Denies Family Hx of Anesthesia complications.    Denies Personal Hx of Anesthesia complications.                    Social:  Former Smoker       Cardiovascular:     Hypertension           hyperlipidemia                             Pulmonary:  Pulmonary Normal                       Musculoskeletal:  Arthritis               Neurological:           Lumbar Spinal Stenosis w/ Radiculopathy  Diabetic Neuropathy                            Endocrine:  Diabetes, type 2 Hypothyroidism              Physical Exam  General: Alert and Oriented    Airway:  Mallampati: II   Mouth Opening: Normal  TM Distance: Normal  Tongue: Normal  Neck ROM: Normal ROM    Chest/Lungs:  Normal Respiratory Rate    Heart:  Rate: Normal  Rhythm: Regular Rhythm        Anesthesia Plan  Type of Anesthesia, risks & benefits discussed:    Anesthesia Type: Gen Natural Airway, Regional  Intra-op Monitoring Plan: Standard ASA Monitors  Post Op Pain Control Plan: IV/PO Opioids PRN and peripheral nerve block  Induction:  IV  Airway Plan: Direct  Informed Consent: Informed consent signed with the Patient and all parties understand the risks and agree with anesthesia plan.  All questions answered. Patient  consented to blood products? No  ASA Score: 2  Day of Surgery Review of History & Physical: H&P Update referred to the surgeon/provider.  Anesthesia Plan Notes: Nasal cannula vs facemask supplemental oxygenation   For patients with KATHYA/obesity, may consider SuperNoval Nasal CPAP  Axillary Blk. with IV Propofol Sedation, possibly may require GA/LMA      Ready For Surgery From Anesthesia Perspective.     .

## 2024-04-25 NOTE — ANESTHESIA POSTPROCEDURE EVALUATION
Anesthesia Post Evaluation    Patient: Heather Arteaga    Procedure(s) Performed: Procedure(s) (LRB):  RELEASE, CARPAL TUNNEL (Left)    Final Anesthesia Type: general      Patient location during evaluation: OPS  Patient participation: Yes- Able to Participate  Level of consciousness: awake and oriented  Post-procedure vital signs: reviewed and stable  Pain management: adequate  Airway patency: patent    PONV status at discharge: No PONV  Anesthetic complications: no      Cardiovascular status: stable and hemodynamically stable  Respiratory status: unassisted and spontaneous ventilation  Hydration status: euvolemic  Follow-up not needed.          Neuro: stable peripheral nerve block    Vitals Value Taken Time   /76 04/25/24 0930   Temp 35.9 °C (96.6 °F) 04/25/24 0930   Pulse 63 04/25/24 0936   Resp 18 04/25/24 0930   SpO2 100 % 04/25/24 0936   Vitals shown include unfiled device data.      Event Time   Out of Recovery 08:52:00         Pain/Marcus Score: Pain Rating Prior to Med Admin: 6 (4/25/2024  9:20 AM)  Pain Rating Post Med Admin: 3 (4/25/2024 10:15 AM)  Marcus Score: 10 (4/25/2024  8:47 AM)

## 2024-04-25 NOTE — TRANSFER OF CARE
"Anesthesia Transfer of Care Note    Patient: Heather Arteaga    Procedure(s) Performed: Procedure(s) (LRB):  RELEASE, CARPAL TUNNEL (Left)    Patient location: PACU    Anesthesia Type: general    Transport from OR: Transported from OR on room air with adequate spontaneous ventilation    Post pain: adequate analgesia    Post assessment: no apparent anesthetic complications    Post vital signs: stable    Level of consciousness: sedated, awake and responds to stimulation    Nausea/Vomiting: no nausea/vomiting    Complications: none    Transfer of care protocol was followed      Last vitals: Visit Vitals  BP (!) 90/55   Pulse 67   Temp 36 °C (96.8 °F)   Resp (!) 8   Ht 5' 5" (1.651 m)   Wt 94.2 kg (207 lb 10.8 oz)   SpO2 100%   Breastfeeding No   BMI 34.56 kg/m²     "

## 2024-04-25 NOTE — ANESTHESIA PROCEDURE NOTES
Peripheral Block    Patient location during procedure: pre-op   Block not for primary anesthetic.  Reason for block: at surgeon's request and post-op pain management   Post-op Pain Location: Left Wrist   Start time: 4/25/2024 7:38 AM  Timeout: 4/25/2024 7:37 AM   End time: 4/25/2024 7:42 AM    Staffing  Authorizing Provider: Shen Holm MD  Performing Provider: Shen Holm MD    Staffing  Performed by: Shen Holm MD  Authorized by: Shen Holm MD    Preanesthetic Checklist  Completed: patient identified, IV checked, site marked, risks and benefits discussed, surgical consent, monitors and equipment checked, pre-op evaluation and timeout performed  Peripheral Block  Patient position: supine  Prep: ChloraPrep  Patient monitoring: heart rate, cardiac monitor, continuous pulse ox and frequent blood pressure checks  Block type: axillary  Laterality: left  Injection technique: single shot  Needle  Needle type: Stimuplex   Needle gauge: 22 G  Needle length: 2 in  Needle localization: anatomical landmarks, ultrasound guidance and nerve stimulator   -ultrasound image captured on disc.  Assessment  Injection assessment: negative aspiration, negative parasthesia and local visualized surrounding nerve  Paresthesia pain: none  Heart rate change: no  Slow fractionated injection: yes  Pain Tolerance: comfortable throughout block and no complaints  Medications:    Medications: mepivacaine (CARBOCAINE) injection 15 mg/mL (1.5%) - Perineural   40 mL - 4/25/2024 7:39:00 AM    Additional Notes  VSS.  [facility] RN monitoring vitals throughout procedure. U/S Image revealed normal appearing axillary anatomy. Continuously aspirated between incremental injections (HEME - neg). Appropriate neuro-stimulator twitch stopped @ 0.48 mA. Patient tolerated procedure well.

## 2024-04-25 NOTE — OP NOTE
Operative report     Date of Operative Procedure: 2024           Location: [facility] OR      Name: Heather Arteaga   : 67 MRN: [id number]     Diagnoses:     Preoperative diagnosis: Left Carpal tunnel syndrome    Postoperative diagnosis: Left Carpal tunnel syndrome     Procedure: Left Carpal tunnel release     Attending Surgeon:   Damien Rangel MD    Assistant:   RICHMOND Marquez NP was essential for the case for positioning, draping,  manipulation of the hand, retraction, closure, and application of dressings     Anesthesia: General with regional nerve block                         Antibiotics: Ancef 2g     Estimated Blood Loss: minimal; less than 5cc     Specimen: none    Complications: None    History of present illness: Patient is a pleasant 58 y.o.-year-old who has had persistent numbness, tingling, and pain into the hand.  Subjective and objective signs of carpal tunnel syndrome were observed. Patient did not respond to conservative treatment and, I recommended open carpal tunnel release.  The planned procedures were discussed with the patient including the associated risks. The risks included but are not limited to bleeding, infection, nerve damage, failure to heal, possible need for reoperation, possible recurrence, or any associated risk of the anesthesia. Patient voiced understanding and agreed to proceed as planned.    Procedure:   Patient was met in the preoperative holding area where verbal and written informed consent were reobtained and confirmed from the patient.  The operative extremity was marked with an indelible ink marker and the patient was taken to the preoperative holding area and administered a regional nerve block by the anesthesia staff.  Patient was then taken back to the operative suite and succumbed to general anesthesia.  The patient was positioned supine on the operating table with all bony prominences being well-padded.  A nonsterile tourniquet was applied  to the operative extremity.  The operative extremity was then prepped and draped in the normal sterile fashion.  A preoperative timeout was performed in which the patient, site, side, and operation to be performed were confirmed. All were in agreement and there were no concerns for moving forward. Preoperative IV antibiotics were administered and we commenced the procedure.     Planned skin incision was marked along the base of the patient's palm at the intersection of Britt's line and the radial side of the ring finger. The upper extremity was then exsanguinated by esmarch. The tourniquet was then inflated to 250 mmHg. Skin incision was then made and dissection was carried down with scalpel to the level of the palmar fascia which was sharply divided in line with the skin incision. Bleeding points were identified and hemostasis was achieved using Bovie electrocautery. Retractors were then placed to allow visualization of the distal extent of the transverse carpal ligament, and this was then divided longitudinally under direct visualization with a 15 blade. I cleaned the underside of the ligament using a freer elevator.  I then completed the incision using scissors while visualizing the nerve.  I incised it distally until I encountered yellow fat.  I incised proximally until I was in the forearm fascia.  I inspected the nerve and there were no lesions.  There was no abnormal contents of the carpal tunnel. The tourniquet was deflated and hemostasis was achieved.The wound was then copiously irrigated with normal saline. Skin incision was then closed with interrupted 3-0 nylon suture. A sterile dressing was applied. The patient was then awakened, extubated, and transferred over to the hospital bed. The patient was transported to recovery room in stable condition. There were no intraoperative or immediate postoperative complications. All counts were reported as correct.     Postoperative plan:   The patient will  maintain surgical dressing clean, dry, and intact for 72 hours. Elevate operative extremity. Prescription provided to start physical therapy and will start finger range of motion exercises. The patient will be given standard medications for pain control. Follow up in 10-14 days for wound check and suture removal.

## 2024-04-26 VITALS
TEMPERATURE: 97 F | HEART RATE: 59 BPM | RESPIRATION RATE: 18 BRPM | SYSTOLIC BLOOD PRESSURE: 143 MMHG | HEIGHT: 65 IN | OXYGEN SATURATION: 99 % | DIASTOLIC BLOOD PRESSURE: 76 MMHG | WEIGHT: 207.69 LBS | BODY MASS INDEX: 34.6 KG/M2

## 2024-05-02 ENCOUNTER — TELEPHONE (OUTPATIENT)
Dept: ORTHOPEDICS | Facility: CLINIC | Age: 59
End: 2024-05-02
Payer: MEDICARE

## 2024-05-02 ENCOUNTER — PATIENT MESSAGE (OUTPATIENT)
Dept: ORTHOPEDICS | Facility: CLINIC | Age: 59
End: 2024-05-02
Payer: MEDICARE

## 2024-05-02 NOTE — TELEPHONE ENCOUNTER
Pictures reviewed. Sutures in appropriate position. Keep incision clean and dry. Cover with dry, clean gauze. Keep scheduled follow up.

## 2024-05-02 NOTE — TELEPHONE ENCOUNTER
Patient called today having some concerns with her incison feeling tight,  Spoke with the patient had her send pictures through the portal, she states that it feels like its swollen clower to her wrist and the last suture might be too tight, she mentions she has been washing her hands and showering, we did let this patient know to try to avoid all of those things and keep the incision dry and covered until her first PO appointment we will reach out to Dr. Rangel and let her know how we will proceed with this.

## 2024-05-08 ENCOUNTER — OFFICE VISIT (OUTPATIENT)
Dept: ORTHOPEDICS | Facility: CLINIC | Age: 59
End: 2024-05-08
Payer: MEDICARE

## 2024-05-08 VITALS
HEART RATE: 61 BPM | HEIGHT: 65 IN | BODY MASS INDEX: 34.97 KG/M2 | SYSTOLIC BLOOD PRESSURE: 127 MMHG | WEIGHT: 209.88 LBS | DIASTOLIC BLOOD PRESSURE: 66 MMHG

## 2024-05-08 DIAGNOSIS — Z98.890 S/P CARPAL TUNNEL RELEASE: Primary | ICD-10-CM

## 2024-05-08 PROCEDURE — 3074F SYST BP LT 130 MM HG: CPT | Mod: CPTII,,, | Performed by: REHABILITATION UNIT

## 2024-05-08 PROCEDURE — 3078F DIAST BP <80 MM HG: CPT | Mod: CPTII,,, | Performed by: REHABILITATION UNIT

## 2024-05-08 PROCEDURE — 4010F ACE/ARB THERAPY RXD/TAKEN: CPT | Mod: CPTII,,, | Performed by: REHABILITATION UNIT

## 2024-05-08 PROCEDURE — 3066F NEPHROPATHY DOC TX: CPT | Mod: CPTII,,, | Performed by: REHABILITATION UNIT

## 2024-05-08 PROCEDURE — 3051F HG A1C>EQUAL 7.0%<8.0%: CPT | Mod: CPTII,,, | Performed by: REHABILITATION UNIT

## 2024-05-08 PROCEDURE — 99024 POSTOP FOLLOW-UP VISIT: CPT | Mod: ,,, | Performed by: REHABILITATION UNIT

## 2024-05-08 PROCEDURE — 3061F NEG MICROALBUMINURIA REV: CPT | Mod: CPTII,,, | Performed by: REHABILITATION UNIT

## 2024-05-08 NOTE — PROGRESS NOTES
Subjective:      Patient ID: Heather Arteaga is a 58 y.o. female.    Chief Complaint: Post-op Evaluation of the Left Wrist (Lt ctr sx 4/25/24- Denies any swelling or numbness. States fingers are stiff but other wise good. States has spasm in wrist every now and again. Is taking tylenol which does help out with pain. )    Date of procedure: 4/25/24     Procedure performed:  Left Carpal tunnel release      Patient returns to the clinic today for post-operative examination. Patient is status post the above-stated procedure. Overall doing well. She had some wound concerns but resolved now. She has some stiffness. No f/c/ns. Sensation and pain are improving.      Review of Systems  Comprehensive review of systems completed and negative except as per HPI.    Objective:     Vitals:    05/08/24 1116   BP: 127/66   Pulse: 61       General: well-developed well-nourished in no acute distress    Physical Exam:  LUE  Incision is healing appropriately with no erythema, fluctuance, induration, drainage or external signs of infection. Sutures in place  No gross swelling or epitrochlear lymphadenopathy appreciated  Good hand and wrist motion with some finger stiffness   Sensation grossly intact to all dermatomal distributions  No neurological deficits appreciated   Palpable radial pulse    Assessment:       Encounter Diagnosis   Name Primary?    S/P carpal tunnel release Yes         Plan:       Heather was seen today for post-op evaluation.    Diagnoses and all orders for this visit:    S/P carpal tunnel release  -     Ambulatory referral/consult to Physical/Occupational Therapy; Future      s/p above stated procedure; doing well     Sutures removed   PT - prescription provided; MTS BB  Pain control - continue ice and meds. Risks of medications discussed  WB- NWB LUE  F/u in 6 weeks    Follow-up: Heather Arteaga to follow up as above. If there are any questions prior to this, the patient was instructed to contact the office.

## 2024-05-14 ENCOUNTER — PATIENT MESSAGE (OUTPATIENT)
Dept: ORTHOPEDICS | Facility: CLINIC | Age: 59
End: 2024-05-14
Payer: MEDICARE

## 2024-05-14 NOTE — TELEPHONE ENCOUNTER
MsDominique Heather is concerned about her incision. I am not sure if this looks okay. Please advise.

## 2024-05-20 ENCOUNTER — LAB VISIT (OUTPATIENT)
Dept: LAB | Facility: HOSPITAL | Age: 59
End: 2024-05-20
Attending: NURSE PRACTITIONER
Payer: MEDICARE

## 2024-05-20 DIAGNOSIS — E11.65 TYPE 2 DIABETES MELLITUS WITH HYPERGLYCEMIA, UNSPECIFIED WHETHER LONG TERM INSULIN USE: ICD-10-CM

## 2024-05-20 LAB
ALBUMIN SERPL-MCNC: 3.8 G/DL (ref 3.5–5)
ALBUMIN/GLOB SERPL: 1.1 RATIO (ref 1.1–2)
ALP SERPL-CCNC: 80 UNIT/L (ref 40–150)
ALT SERPL-CCNC: 25 UNIT/L (ref 0–55)
ANION GAP SERPL CALC-SCNC: 6 MEQ/L
AST SERPL-CCNC: 24 UNIT/L (ref 5–34)
BILIRUB SERPL-MCNC: 0.4 MG/DL
BUN SERPL-MCNC: 13.7 MG/DL (ref 9.8–20.1)
CALCIUM SERPL-MCNC: 9.7 MG/DL (ref 8.4–10.2)
CHLORIDE SERPL-SCNC: 104 MMOL/L (ref 98–107)
CHOLEST SERPL-MCNC: 199 MG/DL
CHOLEST/HDLC SERPL: 3 {RATIO} (ref 0–5)
CO2 SERPL-SCNC: 28 MMOL/L (ref 22–29)
CREAT SERPL-MCNC: 0.68 MG/DL (ref 0.55–1.02)
CREAT UR-MCNC: 251.8 MG/DL (ref 45–106)
CREAT/UREA NIT SERPL: 20
EST. AVERAGE GLUCOSE BLD GHB EST-MCNC: 154.2 MG/DL
GFR SERPLBLD CREATININE-BSD FMLA CKD-EPI: >60 ML/MIN/1.73/M2
GLOBULIN SER-MCNC: 3.4 GM/DL (ref 2.4–3.5)
GLUCOSE SERPL-MCNC: 91 MG/DL (ref 74–100)
HBA1C MFR BLD: 7 %
HDLC SERPL-MCNC: 67 MG/DL (ref 35–60)
LDLC SERPL CALC-MCNC: 120 MG/DL (ref 50–140)
MICROALBUMIN UR-MCNC: 20.3 UG/ML
MICROALBUMIN/CREAT RATIO PNL UR: 8.1 MG/GM CR (ref 0–30)
POTASSIUM SERPL-SCNC: 3.9 MMOL/L (ref 3.5–5.1)
PROT SERPL-MCNC: 7.2 GM/DL (ref 6.4–8.3)
SODIUM SERPL-SCNC: 138 MMOL/L (ref 136–145)
TRIGL SERPL-MCNC: 60 MG/DL (ref 37–140)
VLDLC SERPL CALC-MCNC: 12 MG/DL

## 2024-05-20 PROCEDURE — 36415 COLL VENOUS BLD VENIPUNCTURE: CPT

## 2024-05-20 PROCEDURE — 80061 LIPID PANEL: CPT

## 2024-05-20 PROCEDURE — 82043 UR ALBUMIN QUANTITATIVE: CPT

## 2024-05-20 PROCEDURE — 83036 HEMOGLOBIN GLYCOSYLATED A1C: CPT

## 2024-05-20 PROCEDURE — 80053 COMPREHEN METABOLIC PANEL: CPT

## 2024-05-22 ENCOUNTER — OFFICE VISIT (OUTPATIENT)
Dept: FAMILY MEDICINE | Facility: CLINIC | Age: 59
End: 2024-05-22
Payer: MEDICARE

## 2024-05-22 VITALS
TEMPERATURE: 98 F | HEART RATE: 73 BPM | SYSTOLIC BLOOD PRESSURE: 120 MMHG | WEIGHT: 208.88 LBS | DIASTOLIC BLOOD PRESSURE: 71 MMHG | OXYGEN SATURATION: 98 % | BODY MASS INDEX: 34.8 KG/M2 | RESPIRATION RATE: 18 BRPM | HEIGHT: 65 IN

## 2024-05-22 DIAGNOSIS — Z00.00 WELL ADULT EXAM: ICD-10-CM

## 2024-05-22 DIAGNOSIS — I10 ESSENTIAL HYPERTENSION: Primary | ICD-10-CM

## 2024-05-22 DIAGNOSIS — E11.65 TYPE 2 DIABETES MELLITUS WITH HYPERGLYCEMIA, UNSPECIFIED WHETHER LONG TERM INSULIN USE: ICD-10-CM

## 2024-05-22 DIAGNOSIS — E78.2 MIXED HYPERLIPIDEMIA: ICD-10-CM

## 2024-05-22 PROCEDURE — 3008F BODY MASS INDEX DOCD: CPT | Mod: ,,, | Performed by: NURSE PRACTITIONER

## 2024-05-22 PROCEDURE — 3061F NEG MICROALBUMINURIA REV: CPT | Mod: ,,, | Performed by: NURSE PRACTITIONER

## 2024-05-22 PROCEDURE — 1160F RVW MEDS BY RX/DR IN RCRD: CPT | Mod: ,,, | Performed by: NURSE PRACTITIONER

## 2024-05-22 PROCEDURE — 99212 OFFICE O/P EST SF 10 MIN: CPT | Mod: ,,, | Performed by: NURSE PRACTITIONER

## 2024-05-22 PROCEDURE — 3074F SYST BP LT 130 MM HG: CPT | Mod: ,,, | Performed by: NURSE PRACTITIONER

## 2024-05-22 PROCEDURE — 4010F ACE/ARB THERAPY RXD/TAKEN: CPT | Mod: ,,, | Performed by: NURSE PRACTITIONER

## 2024-05-22 PROCEDURE — 1159F MED LIST DOCD IN RCRD: CPT | Mod: ,,, | Performed by: NURSE PRACTITIONER

## 2024-05-22 PROCEDURE — 3066F NEPHROPATHY DOC TX: CPT | Mod: ,,, | Performed by: NURSE PRACTITIONER

## 2024-05-22 PROCEDURE — 3051F HG A1C>EQUAL 7.0%<8.0%: CPT | Mod: ,,, | Performed by: NURSE PRACTITIONER

## 2024-05-22 PROCEDURE — 3078F DIAST BP <80 MM HG: CPT | Mod: ,,, | Performed by: NURSE PRACTITIONER

## 2024-05-22 NOTE — PATIENT INSTRUCTIONS
Ruepsh Romero,     If you are due for any health screening(s) below please notify me so we can arrange them to be ordered and scheduled. Most healthy patients at your age complete them, but you are free to accept or refuse.     If you can't do it, I'll definitely understand. If you can, I'd certainly appreciate it!    Tests to Keep You Healthy    Mammogram: Met on 12/14/2023  Eye Exam: DUE  Colon Cancer Screening: Met on 4/11/2022  Cervical Cancer Screening: DUE  Last Blood Pressure <= 139/89 (5/22/2024): Yes  Last HbA1c < 8 (05/20/2024): Yes      Your cervical cancer screening is due     Our records indicate that you may be overdue for your screening Pap smear. A Pap smear is an important health screening that can detect abnormal cells that can become cervical cancer. Cervical cancer screenings allow for early diagnosis and increase the likelihood of successful treatment.     The current recommendation for Pap smear screening is every 3-5 years for women at average risk. We encourage you to schedule your appointment with your womens health provider. Many women see a gynecologist for this screening, but some primary care providers also provide Pap screening.     If you recently had your Pap smear screening performed outside of [facility], please let your health care team know so that they can update your health record.      Your diabetic retinal eye exam is due     Diabetes is the #1 cause of blindness in the [place] - early detection before signs or symptoms develop can prevent debilitating blindness.     Our records indicate that you may be overdue for your annual diabetic eye exam. Eye screening can help identify patients at risk for developing vision loss which is common in diabetes. This simple screening is an important step to keeping you healthy and preventing complications from diabetes.     This recommended diabetic eye exam should take place once per year and can prevent and treat diabetes complications  in the eye before developing symptoms. This can be done with a special camera is used to take photographs of the back of your eye without having to dilate them, or you can see an eye doctor for a full dilated exam.     If you recently had your yearly diabetic eye exam performed outside of [facility], please let your Health care team know so that they can update your health record.

## 2024-05-22 NOTE — PROGRESS NOTES
Patient ID: [id number]     Chief Complaint: Diabetes (Follow up) and Hyperlipidemia      HPI:     Heather Arteaga is a 58 y.o. female here today for a follow up. No other complaints today.       Past Medical History:  has a past medical history of Acute pulmonary embolus, Carpal tunnel syndrome, De Quervain's tenosynovitis, Deep vein thrombosis, Diabetes mellitus, type 2, Diabetic neuropathy, Fibroid uterus, Hyperlipidemia, Hypertension, Lumbar back pain with radiculopathy affecting lower extremity, Lumbar spondylitis, Personal history of colonic polyps, and Vitamin D deficiency.    Surgical History:  has a past surgical history that includes Left Femur Sx; Collarbone SX; Repair of meniscus of knee; Neck surgery; Back surgery; Fracture surgery (Right, 05/1/2009); Spine surgery (5/13/2022); Cholecystectomy (2002); Colonoscopy (04/11/2022); ORIF hip fracture (Right); Tonsillectomy; gastric sleeve; and Carpal tunnel release (Left, 4/25/2024).    Family History: family history includes COPD in her mother; Coronary artery disease in her father and mother; Diabetes in her father; Heart disease in her sister.    Social History:  reports that she has quit smoking. Her smoking use included cigarettes. She has never used smokeless tobacco. She reports that she does not currently use alcohol. She reports current drug use. Drug: Hydrocodone.    Current Outpatient Medications   Medication Instructions    aspirin 81 mg, Oral, Daily    calcium citrate-vitamin D3 315-200 mg (CITRACAL+D) 315 mg-5 mcg (200 unit) per tablet 1 tablet, Oral, Daily    chlorthalidone (HYGROTEN) 25 mg, Oral, Daily    ezetimibe (ZETIA) 10 mg, Oral, Every morning    gabapentin (NEURONTIN) 600 MG tablet 1 tablet, Oral, As needed (PRN)    HYDROcodone-acetaminophen (NORCO)  mg per tablet 1 tablet, Oral, Every 4 hours PRN    JARDIANCE 25 mg, Oral, Daily    levothyroxine (SYNTHROID) 50 mcg, Oral, Before breakfast    lisinopriL (PRINIVIL,ZESTRIL) 2.5 mg,  "Oral, Daily    MOVANTIK 25 mg, Oral, As needed (PRN)    naloxone (NARCAN) 4 mg/actuation Spry SMARTSI Spray(s) Both Nares    OZEMPIC 2 mg, Subcutaneous, Every 7 days    REPATHA PUSHTRONEX 420 mg/3.5 mL Injt inject 3.5 mls sub-q once per month    tiZANidine (ZANAFLEX) 4 mg, Oral, 3 times daily PRN       Patient is allergic to atorvastatin, rosuvastatin, coenzyme q10, pioglitazone, buprenorphine, and metformin.     Patient Care Team:  Kriss Kolb FNP as PCP - General (Family Medicine)       Subjective:     Review of Systems    12 point review of systems conducted, negative except as stated in the history of present illness. See HPI for details.      Objective:     Visit Vitals  /71 (BP Location: Right arm, Patient Position: Sitting)   Pulse 73   Temp 97.6 °F (36.4 °C) (Oral)   Resp 18   Ht 5' 5" (1.651 m)   Wt 94.8 kg (208 lb 14.4 oz)   SpO2 98%   BMI 34.76 kg/m²       Physical Exam    General: Alert and oriented, No acute distress.  Head: Normocephalic, Atraumatic.  Eye: Pupils are equal, round and reactive to light, Extraocular movements are intact, Sclera non-icteric.  Ears/Nose/Throat: Normal, Mucosa moist,Clear.  Neck/Thyroid: Supple, Non-tender, No carotid bruit, No lymphadenopathy, No JVD, Full range of motion.  Respiratory: Clear to auscultation bilaterally; No wheezes, rales or rhonchi,Non-labored respirations, Symmetrical chest wall expansion.  Cardiovascular: Regular rate and rhythm, S1/S2 normal, No murmurs, rubs or gallops.  Gastrointestinal: Soft, Non-tender, Non-distended, Normal bowel sounds, No palpable organomegaly.  Musculoskeletal: Normal range of motion.  Integumentary: Warm, Dry, Intact, No suspicious lesions or rashes.  Extremities: No clubbing, cyanosis or edema  Neurologic: No focal deficits, Cranial Nerves II-XII are grossly intact, Motor strength normal upper and lower extremities, Sensory exam intact.  Psychiatric: Normal interaction, Coherent speech, Euthymic mood, " Appropriate affect     Labs Reviewed:     Chemistry:  Lab Results   Component Value Date     05/20/2024    K 3.9 05/20/2024    CHLORIDE 104 05/20/2024    BUN 13.7 05/20/2024    CREATININE 0.68 05/20/2024    EGFRNORACEVR >60 05/20/2024    GLUCOSE 91 05/20/2024    CALCIUM 9.7 05/20/2024    ALKPHOS 80 05/20/2024    LABPROT 7.2 05/20/2024    ALBUMIN 3.8 05/20/2024    BILIDIR 0.2 04/07/2022    IBILI 0.20 04/07/2022    AST 24 05/20/2024    ALT 25 05/20/2024    MG 1.8 03/15/2018    UHWGIXRE96AY 89.3 (H) 07/24/2023    TSH 2.341 02/03/2023    SAZKUX8ZRKF 0.97 02/03/2023        Lab Results   Component Value Date    HGBA1C 7.0 05/20/2024        Hematology:  Lab Results   Component Value Date    WBC 8.22 04/15/2024    HGB 12.6 04/15/2024    HCT 39.4 04/15/2024     04/15/2024       Lipid Panel:  Lab Results   Component Value Date    CHOL 199 05/20/2024    HDL 67 (H) 05/20/2024    .00 05/20/2024    TRIG 60 05/20/2024    TOTALCHOLEST 3 05/20/2024        Urine:  Lab Results   Component Value Date    COLORUA Yellow 02/16/2024    APPEARANCEUA Turbid (A) 02/16/2024    SGUA 1.015 02/16/2024    PHUA 6.5 02/16/2024    PROTEINUA Trace (A) 02/16/2024    GLUCOSEUA >=1000 (A) 02/16/2024    KETONESUA Negative 02/16/2024    BLOODUA Small (A) 02/16/2024    NITRITESUA Negative 02/16/2024    LEUKOCYTESUR Moderate (A) 02/16/2024    RBCUA 3-5 02/16/2024    WBCUA 11-20 (A) 02/16/2024    BACTERIA Moderate (A) 02/16/2024    SQEPUA Few (A) 10/19/2023    HYALINECASTS 0-2 (A) 10/19/2023    CREATRANDUR 251.8 (H) 05/20/2024          Assessment:       ICD-10-CM ICD-9-CM   1. Well adult exam  Z00.00 V70.0   2. Essential hypertension  I10 401.9   3. Mixed hyperlipidemia  E78.2 272.2   4. Type 2 diabetes mellitus with hyperglycemia, unspecified whether long term insulin use  E11.65 250.00        Plan:   1. Essential hypertension  Well controlled.   Continue   Hypertension Medications               chlorthalidone (HYGROTEN) 25 MG Tab Take 25  mg by mouth once daily.    lisinopriL (PRINIVIL,ZESTRIL) 2.5 MG tablet Take 2.5 mg by mouth once daily.        Low Sodium Diet (DASH Diet - Less than 2 grams of sodium per day).  Monitor blood pressure daily and log. Report consistent numbers greater than 140/90.  Maintain healthy weight with goal BMI <30. Exercise 30 minutes per day, 5 days per week.  Smoking cessation encouraged to aid in BP reduction.    2. Mixed hyperlipidemia  Lab Results   Component Value Date    .00 05/20/2024    TRIG 60 05/20/2024    HDL 67 (H) 05/20/2024    TOTALCHOLEST 3 05/20/2024     Continue   Hyperlipidemia Medications               ezetimibe (ZETIA) 10 mg tablet Take 10 mg by mouth every morning.    REPATHA PUSHTRONEX 420 mg/3.5 mL Injt inject 3.5 mls sub-q once per month        Stressed importance of dietary modifications. Follow a low cholesterol, low saturated fat diet with less that 200mg of cholesterol a day.  Avoid fried foods and high saturated fats (high saturated fats less than 7% of calories).  Add Flax Seed/Fish Oil supplements to diet. Increase dietary fiber.  Regular exercise can reduce LDL and raise HDL. Stressed importance of physical activity 5 times per week for 30 minutes per day.     3. Type 2 diabetes mellitus with hyperglycemia, unspecified whether long term insulin use  Lab Results   Component Value Date    HGBA1C 7.0 05/20/2024    HGBA1C 8.4 (H) 02/07/2024    .00 05/20/2024    CREATININE 0.68 05/20/2024      Continue    Diabetes Medications               empagliflozin (JARDIANCE) 25 mg tablet Take 25 mg by mouth once daily.    OZEMPIC 2 mg/dose (8 mg/3 mL) PnIj Inject 2 mg into the skin every 7 days.        On ACE and Statin according to guidelines.  Discussed caution with SGLT2s + diuretics as concomitant use can cause volume depletion. Discussed caution with DPP-Ivs and HF risk.  Follow ADA Diet. Avoid soda, simple sweets, and limit rice/pasta/breads/starches (no more than 45-50 grams per  meal).  Maintain healthy weight with goal BMI <30.  Exercise 5 times per week for 30 minutes per day.  Stressed importance of daily foot exams.  Stressed importance of annual dilated eye exam.    4. Well adult exam  - CBC Auto Differential; Future  - Comprehensive Metabolic Panel; Future  - Lipid Panel; Future  - TSH; Future  - Hemoglobin A1C; Future  - Urinalysis; Future  - Microalbumin/Creatinine Ratio, Urine; Future  - Vitamin D; Future  - T4, Free; Future  - Urinalysis    Follow up in about 3 months (around 8/22/2024) for Wellness. In addition to their scheduled follow up, the patient has also been instructed to follow up on as needed basis.     Future Appointments   Date Time Provider Department Center   6/19/2024  9:00 AM [facility]   8/22/2024  1:30 PM Kriss Kolb FNP [facility]        KHAI Zambrano

## 2024-05-31 ENCOUNTER — TELEPHONE (OUTPATIENT)
Dept: FAMILY MEDICINE | Facility: CLINIC | Age: 59
End: 2024-05-31
Payer: MEDICARE

## 2024-05-31 ENCOUNTER — LAB VISIT (OUTPATIENT)
Dept: LAB | Facility: HOSPITAL | Age: 59
End: 2024-05-31
Attending: NURSE PRACTITIONER
Payer: MEDICARE

## 2024-05-31 DIAGNOSIS — R30.0 DYSURIA: Primary | ICD-10-CM

## 2024-05-31 DIAGNOSIS — R30.0 DYSURIA: ICD-10-CM

## 2024-05-31 LAB
BACTERIA #/AREA URNS AUTO: ABNORMAL /HPF
BILIRUB UR QL STRIP.AUTO: NEGATIVE
CLARITY UR: ABNORMAL
COLOR UR AUTO: YELLOW
GLUCOSE UR QL STRIP: 250
HGB UR QL STRIP: ABNORMAL
KETONES UR QL STRIP: NEGATIVE
LEUKOCYTE ESTERASE UR QL STRIP: ABNORMAL
NITRITE UR QL STRIP: NEGATIVE
PH UR STRIP: 6 [PH]
PROT UR QL STRIP: >=300
RBC #/AREA URNS AUTO: ABNORMAL /HPF
SP GR UR STRIP.AUTO: >=1.03 (ref 1–1.03)
SQUAMOUS #/AREA URNS AUTO: ABNORMAL /HPF
UROBILINOGEN UR STRIP-ACNC: 1
WBC #/AREA URNS AUTO: ABNORMAL /HPF

## 2024-05-31 PROCEDURE — 87086 URINE CULTURE/COLONY COUNT: CPT

## 2024-05-31 PROCEDURE — 81001 URINALYSIS AUTO W/SCOPE: CPT

## 2024-05-31 NOTE — TELEPHONE ENCOUNTER
Pt aware. Understanding voiced.   ----- Message from KHAI Zambrano sent at 5/31/2024 11:41 AM CDT -----  Please inform patient of results.    1. Urine culture pending.  Increase water intake.  Will call once results are completed.    All other findings within acceptable ranges.

## 2024-06-03 ENCOUNTER — TELEPHONE (OUTPATIENT)
Dept: FAMILY MEDICINE | Facility: CLINIC | Age: 59
End: 2024-06-03
Payer: MEDICARE

## 2024-06-03 LAB
BACTERIA UR CULT: ABNORMAL
BACTERIA UR CULT: ABNORMAL

## 2024-06-03 RX ORDER — CIPROFLOXACIN 500 MG/1
500 TABLET ORAL EVERY 12 HOURS
Qty: 10 TABLET | Refills: 0 | Status: SHIPPED | OUTPATIENT
Start: 2024-06-03 | End: 2024-06-08

## 2024-06-03 NOTE — TELEPHONE ENCOUNTER
Pt aware. Understanding voiced.  ----- Message from KHAI Zambrano sent at 6/3/2024 11:20 AM CDT -----  Please inform patient of results.    1. UTI.  Cipro 500 mg p.o. b.i.d. x5 days sent to pharmacy.    All other findings within acceptable ranges.

## 2024-06-19 ENCOUNTER — OFFICE VISIT (OUTPATIENT)
Dept: ORTHOPEDICS | Facility: CLINIC | Age: 59
End: 2024-06-19
Payer: MEDICARE

## 2024-06-19 VITALS
SYSTOLIC BLOOD PRESSURE: 164 MMHG | BODY MASS INDEX: 34.82 KG/M2 | DIASTOLIC BLOOD PRESSURE: 70 MMHG | WEIGHT: 209 LBS | HEIGHT: 65 IN | HEART RATE: 65 BPM

## 2024-06-19 DIAGNOSIS — Z98.890 S/P CARPAL TUNNEL RELEASE: Primary | ICD-10-CM

## 2024-06-19 PROCEDURE — 3061F NEG MICROALBUMINURIA REV: CPT | Mod: CPTII,,, | Performed by: REHABILITATION UNIT

## 2024-06-19 PROCEDURE — 1159F MED LIST DOCD IN RCRD: CPT | Mod: CPTII,,, | Performed by: REHABILITATION UNIT

## 2024-06-19 PROCEDURE — 3066F NEPHROPATHY DOC TX: CPT | Mod: CPTII,,, | Performed by: REHABILITATION UNIT

## 2024-06-19 PROCEDURE — 4010F ACE/ARB THERAPY RXD/TAKEN: CPT | Mod: CPTII,,, | Performed by: REHABILITATION UNIT

## 2024-06-19 PROCEDURE — 3077F SYST BP >= 140 MM HG: CPT | Mod: CPTII,,, | Performed by: REHABILITATION UNIT

## 2024-06-19 PROCEDURE — 3078F DIAST BP <80 MM HG: CPT | Mod: CPTII,,, | Performed by: REHABILITATION UNIT

## 2024-06-19 PROCEDURE — 99024 POSTOP FOLLOW-UP VISIT: CPT | Mod: ,,, | Performed by: REHABILITATION UNIT

## 2024-06-19 PROCEDURE — 3051F HG A1C>EQUAL 7.0%<8.0%: CPT | Mod: CPTII,,, | Performed by: REHABILITATION UNIT

## 2024-06-19 NOTE — PROGRESS NOTES
Subjective:      Patient ID: Heather Arteaga is a 58 y.o. female.    Chief Complaint: Follow-up (2 mth s/p L CTR 04/25/24 no complaints. Numbness has started to subside. )    Date of procedure: 4/25/24     Procedure performed:  Left Carpal tunnel release      Patient returns to the clinic today for post-operative examination. Patient is status post the above-stated procedure. Overall doing well. wound healed well. Morning finger stiffness but improving. Attending PT. No f/c/ns. Sensation and pain are improving.      Review of Systems  Comprehensive review of systems completed and negative except as per HPI.    Objective:     Vitals:    06/19/24 0913   BP: (!) 164/70   Pulse: 65         General: well-developed well-nourished in no acute distress    Physical Exam:  LUE  Incision is well healed with no erythema, fluctuance, induration, drainage or external signs of infection.    No gross swelling or epitrochlear lymphadenopathy appreciated  Good hand and wrist and finger motion    Sensation grossly intact to all dermatomal distributions  No neurological deficits appreciated   Palpable radial pulse    Assessment:       Encounter Diagnosis   Name Primary?    S/P carpal tunnel release Yes           Plan:       Heather was seen today for follow-up.    Diagnoses and all orders for this visit:    S/P carpal tunnel release        s/p above stated procedure; doing well     PT - continue MTS BB  Pain control - continue ice and meds. Risks of medications discussed  WB- WBAT LUE  F/u in 8 weeks for likely final check and release    Follow-up: Heather Arteaga to follow up as above. If there are any questions prior to this, the patient was instructed to contact the office.

## 2024-06-25 DIAGNOSIS — E11.65 TYPE 2 DIABETES MELLITUS WITH HYPERGLYCEMIA, UNSPECIFIED WHETHER LONG TERM INSULIN USE: Primary | ICD-10-CM

## 2024-07-10 ENCOUNTER — TELEPHONE (OUTPATIENT)
Dept: FAMILY MEDICINE | Facility: CLINIC | Age: 59
End: 2024-07-10
Payer: MEDICARE

## 2024-07-10 DIAGNOSIS — Z12.4 ENCOUNTER FOR SCREENING FOR CERVICAL CANCER: Primary | ICD-10-CM

## 2024-07-10 NOTE — TELEPHONE ENCOUNTER
Referral sent, pt aware.----- Message from Brie Valdez sent at 7/9/2024  6:02 PM CDT -----  Regarding: Referral  Pt. Needs A Referral To See OB/GYN Pt. Stated She Wants To See ([facility]  Phone:[phone]   Fax: [phone] [facility]

## 2024-07-30 ENCOUNTER — TELEPHONE (OUTPATIENT)
Dept: FAMILY MEDICINE | Facility: CLINIC | Age: 59
End: 2024-07-30

## 2024-07-30 ENCOUNTER — TELEPHONE (OUTPATIENT)
Dept: FAMILY MEDICINE | Facility: CLINIC | Age: 59
End: 2024-07-30
Payer: MEDICARE

## 2024-07-30 NOTE — TELEPHONE ENCOUNTER
----- Message from Radha Saleem sent at 7/30/2024 10:01 AM CDT -----  Who Called: Heather Arteaga    Preferred Method of Contact: Phone Call  Patient's Preferred Phone Number on File: [phone]   Best Call Back Number, if different:  Additional Information: Heather is calling to check on the status of her GYN referral

## 2024-07-30 NOTE — TELEPHONE ENCOUNTER
07/30/24 [facility] ( [facility]) - they don't take insurance      Added; patient will call us back with who does take her ins.

## 2024-07-30 NOTE — TELEPHONE ENCOUNTER
----- Message from Rima Fernandez sent at 7/30/2024 11:46 AM CDT -----  Regarding: call back  .Who Called: Heather Arteaga    Caller is requesting assistance/information from provider's office.    Symptoms (please be specific):    How long has patient had these symptoms:    List of preferred pharmacies on file (remove unneeded): [unfilled]  If different, enter pharmacy into here including location and phone number:       Preferred Method of Contact: Phone Call  Patient's Preferred Phone Number on File: [phone]   Best Call Back Number, if different:  Additional Information: pt requesting a call back with the phone number of the referral for  [facility]  [phone]

## 2024-08-15 ENCOUNTER — TELEPHONE (OUTPATIENT)
Dept: FAMILY MEDICINE | Facility: CLINIC | Age: 59
End: 2024-08-15
Payer: MEDICARE

## 2024-08-19 ENCOUNTER — OFFICE VISIT (OUTPATIENT)
Dept: ORTHOPEDICS | Facility: CLINIC | Age: 59
End: 2024-08-19
Payer: MEDICARE

## 2024-08-19 VITALS
HEART RATE: 88 BPM | BODY MASS INDEX: 34.82 KG/M2 | SYSTOLIC BLOOD PRESSURE: 117 MMHG | HEIGHT: 65 IN | WEIGHT: 209 LBS | DIASTOLIC BLOOD PRESSURE: 72 MMHG

## 2024-08-19 DIAGNOSIS — Z98.890 S/P CARPAL TUNNEL RELEASE: Primary | ICD-10-CM

## 2024-08-19 PROCEDURE — 3074F SYST BP LT 130 MM HG: CPT | Mod: CPTII,,, | Performed by: REHABILITATION UNIT

## 2024-08-19 PROCEDURE — 1159F MED LIST DOCD IN RCRD: CPT | Mod: CPTII,,, | Performed by: REHABILITATION UNIT

## 2024-08-19 PROCEDURE — 4010F ACE/ARB THERAPY RXD/TAKEN: CPT | Mod: CPTII,,, | Performed by: REHABILITATION UNIT

## 2024-08-19 PROCEDURE — 3008F BODY MASS INDEX DOCD: CPT | Mod: CPTII,,, | Performed by: REHABILITATION UNIT

## 2024-08-19 PROCEDURE — 99213 OFFICE O/P EST LOW 20 MIN: CPT | Mod: ,,, | Performed by: REHABILITATION UNIT

## 2024-08-19 PROCEDURE — 3078F DIAST BP <80 MM HG: CPT | Mod: CPTII,,, | Performed by: REHABILITATION UNIT

## 2024-08-19 PROCEDURE — 3066F NEPHROPATHY DOC TX: CPT | Mod: CPTII,,, | Performed by: REHABILITATION UNIT

## 2024-08-19 PROCEDURE — 3061F NEG MICROALBUMINURIA REV: CPT | Mod: CPTII,,, | Performed by: REHABILITATION UNIT

## 2024-08-19 PROCEDURE — 3051F HG A1C>EQUAL 7.0%<8.0%: CPT | Mod: CPTII,,, | Performed by: REHABILITATION UNIT

## 2024-08-19 NOTE — PROGRESS NOTES
Subjective:      Patient ID: Heather Arteaga is a 58 y.o. female.    Chief Complaint: Follow-up of the Left Wrist (4 mo LT CTR sx 4/25/24- Reports stiffness in entire hand and soreness felt in morning and when closing hand. Stated has some loss of strength, unable to close hand at times. Denies any swelling. Is taking tylenol which helps with some of the soreness. )    Date of procedure: 4/25/24     Procedure performed:  Left Carpal tunnel release      Patient returns to the clinic today for post-operative examination. Patient is status post the above-stated procedure. Overall doing well. Wound healed. Occasional finger stiffness but improving. Attended PT. No f/c/ns. Sensation and pain are improving.  She is able to sleep uninterrupted.      Review of Systems  Comprehensive review of systems completed and negative except as per HPI.    Objective:     Vitals:    08/19/24 1022   BP: 117/72   Pulse: 88         General: well-developed well-nourished in no acute distress    Physical Exam:  LUE  Incision is well healed with no erythema, fluctuance, induration, drainage or external signs of infection.  Does have some keloid.   No gross swelling or epitrochlear lymphadenopathy appreciated  Full hand and wrist and finger motion    Sensation grossly intact to all dermatomal distributions  No neurological deficits appreciated   Palpable radial pulse    Assessment:       No diagnosis found.          Plan:       There are no diagnoses linked to this encounter.      s/p above stated procedure; doing well     Continue home exercises and scar massage encouraged  Pain control - continue ice and meds. Risks of medications discussed  WB- WBAT LUE  F/u as needed.  Discussed that she may continue see improvement for 12-18 months after surgery    Follow-up: Heather Arteaga to follow up as above. If there are any questions prior to this, the patient was instructed to contact the office.

## 2024-08-20 ENCOUNTER — LAB VISIT (OUTPATIENT)
Dept: LAB | Facility: HOSPITAL | Age: 59
End: 2024-08-20
Attending: NURSE PRACTITIONER
Payer: MEDICARE

## 2024-08-20 DIAGNOSIS — Z00.00 WELL ADULT EXAM: ICD-10-CM

## 2024-08-20 LAB
25(OH)D3+25(OH)D2 SERPL-MCNC: 32 NG/ML (ref 30–80)
ALBUMIN SERPL-MCNC: 3.9 G/DL (ref 3.5–5)
ALBUMIN/GLOB SERPL: 1.3 RATIO (ref 1.1–2)
ALP SERPL-CCNC: 81 UNIT/L (ref 40–150)
ALT SERPL-CCNC: 26 UNIT/L (ref 0–55)
ANION GAP SERPL CALC-SCNC: 8 MEQ/L
AST SERPL-CCNC: 26 UNIT/L (ref 5–34)
BASOPHILS # BLD AUTO: 0.02 X10(3)/MCL
BASOPHILS NFR BLD AUTO: 0.3 %
BILIRUB SERPL-MCNC: 0.4 MG/DL
BUN SERPL-MCNC: 16.3 MG/DL (ref 9.8–20.1)
CALCIUM SERPL-MCNC: 9.8 MG/DL (ref 8.4–10.2)
CHLORIDE SERPL-SCNC: 105 MMOL/L (ref 98–107)
CHOLEST SERPL-MCNC: 200 MG/DL
CHOLEST/HDLC SERPL: 3 {RATIO} (ref 0–5)
CO2 SERPL-SCNC: 30 MMOL/L (ref 22–29)
CREAT SERPL-MCNC: 0.77 MG/DL (ref 0.55–1.02)
CREAT UR-MCNC: 151.4 MG/DL (ref 45–106)
CREAT/UREA NIT SERPL: 21
EOSINOPHIL # BLD AUTO: 0.11 X10(3)/MCL (ref 0–0.9)
EOSINOPHIL NFR BLD AUTO: 1.7 %
ERYTHROCYTE [DISTWIDTH] IN BLOOD BY AUTOMATED COUNT: 12.7 % (ref 11.5–17)
EST. AVERAGE GLUCOSE BLD GHB EST-MCNC: 142.7 MG/DL
GFR SERPLBLD CREATININE-BSD FMLA CKD-EPI: >60 ML/MIN/1.73/M2
GLOBULIN SER-MCNC: 3.1 GM/DL (ref 2.4–3.5)
GLUCOSE SERPL-MCNC: 119 MG/DL (ref 74–100)
HBA1C MFR BLD: 6.6 %
HCT VFR BLD AUTO: 42.9 % (ref 37–47)
HDLC SERPL-MCNC: 66 MG/DL (ref 35–60)
HGB BLD-MCNC: 13.4 G/DL (ref 12–16)
IMM GRANULOCYTES # BLD AUTO: 0.01 X10(3)/MCL (ref 0–0.04)
IMM GRANULOCYTES NFR BLD AUTO: 0.2 %
LDLC SERPL CALC-MCNC: 123 MG/DL (ref 50–140)
LYMPHOCYTES # BLD AUTO: 3.02 X10(3)/MCL (ref 0.6–4.6)
LYMPHOCYTES NFR BLD AUTO: 47.4 %
MCH RBC QN AUTO: 28.2 PG (ref 27–31)
MCHC RBC AUTO-ENTMCNC: 31.2 G/DL (ref 33–36)
MCV RBC AUTO: 90.3 FL (ref 80–94)
MICROALBUMIN UR-MCNC: 24.5 UG/ML
MICROALBUMIN/CREAT RATIO PNL UR: 16.2 MG/GM CR (ref 0–30)
MONOCYTES # BLD AUTO: 0.32 X10(3)/MCL (ref 0.1–1.3)
MONOCYTES NFR BLD AUTO: 5 %
NEUTROPHILS # BLD AUTO: 2.89 X10(3)/MCL (ref 2.1–9.2)
NEUTROPHILS NFR BLD AUTO: 45.4 %
PLATELET # BLD AUTO: 208 X10(3)/MCL (ref 130–400)
PMV BLD AUTO: 9.6 FL (ref 7.4–10.4)
POTASSIUM SERPL-SCNC: 3.7 MMOL/L (ref 3.5–5.1)
PROT SERPL-MCNC: 7 GM/DL (ref 6.4–8.3)
RBC # BLD AUTO: 4.75 X10(6)/MCL (ref 4.2–5.4)
SODIUM SERPL-SCNC: 143 MMOL/L (ref 136–145)
T4 FREE SERPL-MCNC: 0.91 NG/DL (ref 0.7–1.48)
TRIGL SERPL-MCNC: 56 MG/DL (ref 37–140)
TSH SERPL-ACNC: 3.02 UIU/ML (ref 0.35–4.94)
VLDLC SERPL CALC-MCNC: 11 MG/DL
WBC # BLD AUTO: 6.37 X10(3)/MCL (ref 4.5–11.5)

## 2024-08-20 PROCEDURE — 36415 COLL VENOUS BLD VENIPUNCTURE: CPT

## 2024-08-20 PROCEDURE — 82570 ASSAY OF URINE CREATININE: CPT

## 2024-08-20 PROCEDURE — 80053 COMPREHEN METABOLIC PANEL: CPT

## 2024-08-20 PROCEDURE — 85025 COMPLETE CBC W/AUTO DIFF WBC: CPT

## 2024-08-20 PROCEDURE — 80061 LIPID PANEL: CPT

## 2024-08-20 PROCEDURE — 82306 VITAMIN D 25 HYDROXY: CPT

## 2024-08-20 PROCEDURE — 84443 ASSAY THYROID STIM HORMONE: CPT

## 2024-08-20 PROCEDURE — 83036 HEMOGLOBIN GLYCOSYLATED A1C: CPT

## 2024-08-20 PROCEDURE — 82043 UR ALBUMIN QUANTITATIVE: CPT

## 2024-08-20 PROCEDURE — 84439 ASSAY OF FREE THYROXINE: CPT

## 2024-08-22 ENCOUNTER — TELEPHONE (OUTPATIENT)
Dept: FAMILY MEDICINE | Facility: CLINIC | Age: 59
End: 2024-08-22

## 2024-08-22 ENCOUNTER — OFFICE VISIT (OUTPATIENT)
Dept: FAMILY MEDICINE | Facility: CLINIC | Age: 59
End: 2024-08-22
Payer: MEDICARE

## 2024-08-22 VITALS
BODY MASS INDEX: 35.79 KG/M2 | HEIGHT: 65 IN | DIASTOLIC BLOOD PRESSURE: 61 MMHG | RESPIRATION RATE: 18 BRPM | TEMPERATURE: 98 F | OXYGEN SATURATION: 98 % | SYSTOLIC BLOOD PRESSURE: 102 MMHG | WEIGHT: 214.81 LBS | HEART RATE: 72 BPM

## 2024-08-22 DIAGNOSIS — F41.9 ANXIETY: ICD-10-CM

## 2024-08-22 DIAGNOSIS — R30.0 DYSURIA: ICD-10-CM

## 2024-08-22 DIAGNOSIS — E66.01 CLASS 2 SEVERE OBESITY WITH SERIOUS COMORBIDITY AND BODY MASS INDEX (BMI) OF 35.0 TO 35.9 IN ADULT, UNSPECIFIED OBESITY TYPE: ICD-10-CM

## 2024-08-22 DIAGNOSIS — E11.65 TYPE 2 DIABETES MELLITUS WITH HYPERGLYCEMIA, UNSPECIFIED WHETHER LONG TERM INSULIN USE: ICD-10-CM

## 2024-08-22 DIAGNOSIS — Z00.00 WELL ADULT EXAM: Primary | ICD-10-CM

## 2024-08-22 LAB
BACTERIA #/AREA URNS AUTO: ABNORMAL /HPF
BILIRUB UR QL STRIP.AUTO: NEGATIVE
CLARITY UR: CLEAR
COLOR UR AUTO: YELLOW
GLUCOSE UR QL STRIP: >=1000
HGB UR QL STRIP: ABNORMAL
KETONES UR QL STRIP: ABNORMAL
LEUKOCYTE ESTERASE UR QL STRIP: NEGATIVE
NITRITE UR QL STRIP: POSITIVE
PH UR STRIP: 5 [PH]
PROT UR QL STRIP: NEGATIVE
RBC #/AREA URNS AUTO: ABNORMAL /HPF
SP GR UR STRIP.AUTO: 1.02 (ref 1–1.03)
SQUAMOUS #/AREA URNS AUTO: ABNORMAL /HPF
UROBILINOGEN UR STRIP-ACNC: 0.2
WBC #/AREA URNS AUTO: ABNORMAL /HPF

## 2024-08-22 PROCEDURE — 3066F NEPHROPATHY DOC TX: CPT | Mod: ,,, | Performed by: NURSE PRACTITIONER

## 2024-08-22 PROCEDURE — 3074F SYST BP LT 130 MM HG: CPT | Mod: ,,, | Performed by: NURSE PRACTITIONER

## 2024-08-22 PROCEDURE — 3044F HG A1C LEVEL LT 7.0%: CPT | Mod: ,,, | Performed by: NURSE PRACTITIONER

## 2024-08-22 PROCEDURE — 3078F DIAST BP <80 MM HG: CPT | Mod: ,,, | Performed by: NURSE PRACTITIONER

## 2024-08-22 PROCEDURE — G0439 PPPS, SUBSEQ VISIT: HCPCS | Mod: ,,, | Performed by: NURSE PRACTITIONER

## 2024-08-22 PROCEDURE — 3061F NEG MICROALBUMINURIA REV: CPT | Mod: ,,, | Performed by: NURSE PRACTITIONER

## 2024-08-22 PROCEDURE — G2211 COMPLEX E/M VISIT ADD ON: HCPCS | Mod: ,,, | Performed by: NURSE PRACTITIONER

## 2024-08-22 PROCEDURE — 4010F ACE/ARB THERAPY RXD/TAKEN: CPT | Mod: ,,, | Performed by: NURSE PRACTITIONER

## 2024-08-22 PROCEDURE — 81003 URINALYSIS AUTO W/O SCOPE: CPT | Performed by: NURSE PRACTITIONER

## 2024-08-22 PROCEDURE — 1160F RVW MEDS BY RX/DR IN RCRD: CPT | Mod: ,,, | Performed by: NURSE PRACTITIONER

## 2024-08-22 PROCEDURE — 1159F MED LIST DOCD IN RCRD: CPT | Mod: ,,, | Performed by: NURSE PRACTITIONER

## 2024-08-22 RX ORDER — TIRZEPATIDE 2.5 MG/.5ML
2.5 INJECTION, SOLUTION SUBCUTANEOUS
Qty: 0.5 ML | Refills: 1 | Status: SHIPPED | OUTPATIENT
Start: 2024-08-22

## 2024-08-22 RX ORDER — BUSPIRONE HYDROCHLORIDE 5 MG/1
5 TABLET ORAL 2 TIMES DAILY
Qty: 60 TABLET | Refills: 5 | Status: SHIPPED | OUTPATIENT
Start: 2024-08-22 | End: 2025-08-22

## 2024-08-22 RX ORDER — CIPROFLOXACIN 500 MG/1
500 TABLET ORAL EVERY 12 HOURS
Qty: 10 TABLET | Refills: 0 | Status: SHIPPED | OUTPATIENT
Start: 2024-08-22 | End: 2024-08-27

## 2024-08-22 NOTE — TELEPHONE ENCOUNTER
Pt aware. Understanding voiced.     ----- Message from KHAI Zambrano sent at 8/22/2024  4:43 PM CDT -----  Please inform patient of results.    1. Urinary tract infection.  Start Cipro 500 mg p.o. b.i.d. x5 days.  We will call once culture is completed if antibiotics need to be changed.  Increase fluid intake to 6-8 bottles daily.  Perineal hygiene.  Good blood glucose control.    All other findings within acceptable ranges.

## 2024-08-22 NOTE — PATIENT INSTRUCTIONS
Rupesh Romero,     If you are due for any health screening(s) below please notify me so we can arrange them to be ordered and scheduled. Most healthy patients at your age complete them, but you are free to accept or refuse.     If you can't do it, I'll definitely understand. If you can, I'd certainly appreciate it!    Tests to Keep You Healthy    Mammogram: Met on 12/14/2023  Eye Exam: DUE  Colon Cancer Screening: Met on 4/11/2022  Cervical Cancer Screening: DUE  Last Blood Pressure <= 139/89 (8/22/2024): Yes  Last HbA1c < 8 (08/20/2024): Yes      Your cervical cancer screening is due     Our records indicate that you may be overdue for your screening Pap smear. A Pap smear is an important health screening that can detect abnormal cells that can become cervical cancer. Cervical cancer screenings allow for early diagnosis and increase the likelihood of successful treatment.     The current recommendation for Pap smear screening is every 3-5 years for women at average risk. We encourage you to schedule your appointment with your womens health provider. Many women see a gynecologist for this screening, but some primary care providers also provide Pap screening.     If you recently had your Pap smear screening performed outside of [facility], please let your health care team know so that they can update your health record.      Your diabetic retinal eye exam is due     Diabetes is the #1 cause of blindness in the [place] - early detection before signs or symptoms develop can prevent debilitating blindness.     Our records indicate that you may be overdue for your annual diabetic eye exam. Eye screening can help identify patients at risk for developing vision loss which is common in diabetes. This simple screening is an important step to keeping you healthy and preventing complications from diabetes.     This recommended diabetic eye exam should take place once per year and can prevent and treat diabetes complications  in the eye before developing symptoms. This can be done with a special camera is used to take photographs of the back of your eye without having to dilate them, or you can see an eye doctor for a full dilated exam.     If you recently had your yearly diabetic eye exam performed outside of [facility], please let your Health care team know so that they can update your health record.

## 2024-08-22 NOTE — PROGRESS NOTES
Patient ID: [id number]     Chief Complaint: Medicare AWV      HPI:     Heather Arteaga is a 58 y.o. female here today for an annual wellness visit.  Presents with complaints of dysuria.  Denies hematuria, pyuria, fever or flank pain.    Presents with complaints of increased anxiety and panic attacks.  Patient reports personal situations that she attributes these symptoms to.  Denies SI/HI.    Health Maintenance   Topic Date Due    Hepatitis C Screening  Never done    Foot Exam  Never done    Low Dose Statin  Never done    Eye Exam  10/07/2023    Mammogram  12/14/2024    Hemoglobin A1c  11/20/2024    Lipid Panel  08/20/2025    Colorectal Cancer Screening  04/11/2027    TETANUS VACCINE  04/16/2032    Shingles Vaccine  Completed     Dental Exam - Recommend biannually  Vaccinations -   Immunization History   Administered Date(s) Administered    COVID-19 MRNA, LN-S PF (MODERNA HALF 0.25 ML DOSE) 06/23/2022    COVID-19, MRNA, LN-S, PF (MODERNA FULL 0.5 ML DOSE) 01/26/2021, 02/23/2021, 10/26/2021    COVID-19, MRNA, LN-S, PF (Pfizer) (Purple Cap) 09/28/2022    COVID-19, mRNA, LNP-S, bivalent booster, PF (Moderna Omicron)12 + YEARS 09/28/2022, 03/24/2023    DTP 01/18/1967, 02/15/1967, 03/15/1967, 03/19/1968, 04/25/1972    Influenza - Quadrivalent 10/15/2019, 10/20/2020, 12/09/2021    Influenza - Quadrivalent - PF *Preferred* (6 months and older) 11/09/2022    Influenza - Trivalent - PF (ADULT) 09/20/2012    Influenza A (H1N1) 2009 Monovalent - IM 12/01/2009    Influenza Split 09/20/2012    Measles 12/07/1967    OPV 01/18/1967, 02/15/1967, 03/15/1967, 05/07/1968, 04/25/1972    Pneumococcal Polysaccharide - 23 Valent 01/20/2016, 11/23/2021    Tdap 06/01/1982, 12/14/1987, 10/15/2019, 04/16/2022    Zoster Recombinant 12/01/2020, 03/24/2021        Past Medical History:  has a past medical history of Acute pulmonary embolus, Carpal tunnel syndrome, De Quervain's tenosynovitis, Deep vein thrombosis, Diabetes mellitus, type 2,  Diabetic neuropathy, Fibroid uterus, Hyperlipidemia, Hypertension, Lumbar back pain with radiculopathy affecting lower extremity, Lumbar spondylitis, Personal history of colonic polyps, and Vitamin D deficiency.    Surgical History:  has a past surgical history that includes Left Femur Sx; Collarbone SX; Repair of meniscus of knee; Neck surgery; Back surgery; Fracture surgery (Right, 2009); Spine surgery (2022); Cholecystectomy (); Colonoscopy (2022); ORIF hip fracture (Right); Tonsillectomy; gastric sleeve; and Carpal tunnel release (Left, 2024).    Family History: family history includes COPD in her mother; Coronary artery disease in her father and mother; Diabetes in her father; Heart disease in her sister.    Social History:  reports that she has quit smoking. Her smoking use included cigarettes. She has never used smokeless tobacco. She reports that she does not currently use alcohol. She reports current drug use. Drug: Hydrocodone.    Current Outpatient Medications   Medication Instructions    aspirin 81 mg, Oral, Daily    busPIRone (BUSPAR) 5 mg, Oral, 2 times daily    calcium citrate-vitamin D3 315-200 mg (CITRACAL+D) 315 mg-5 mcg (200 unit) per tablet 1 tablet, Oral, Daily    chlorthalidone (HYGROTEN) 25 mg, Oral, Daily    empagliflozin (JARDIANCE) 10 mg, Oral, Daily    ezetimibe (ZETIA) 10 mg, Oral, Every morning    gabapentin (NEURONTIN) 600 MG tablet 1 tablet, Oral, As needed (PRN)    HYDROcodone-acetaminophen (NORCO)  mg per tablet 1 tablet, Oral, Every 4 hours PRN    levothyroxine (SYNTHROID) 50 mcg, Oral, Before breakfast    lisinopriL (PRINIVIL,ZESTRIL) 2.5 mg, Oral, Daily    MOUNJARO 2.5 mg, Subcutaneous, Every 7 days    MOVANTIK 25 mg, Oral, As needed (PRN)    naloxone (NARCAN) 4 mg/actuation Spry SMARTSI Spray(s) Both Nares    REPATHA PUSHTRONEX 420 mg/3.5 mL Injt inject 3.5 mls sub-q once per month       Patient is allergic to atorvastatin, rosuvastatin, coenzyme  "q10, pioglitazone, buprenorphine, and metformin.     Patient Care Team:  Kriss Kolb FNP as PCP - General (Family Medicine)       Subjective:     Review of Systems    12 point review of systems conducted, negative except as stated in the history of present illness. See HPI for details.      Objective:     Visit Vitals  /61 (BP Location: Left arm, Patient Position: Sitting)   Pulse 72   Temp 97.7 °F (36.5 °C) (Oral)   Resp 18   Ht 5' 5" (1.651 m)   Wt 97.4 kg (214 lb 12.8 oz)   SpO2 98%   BMI 35.74 kg/m²       Physical Exam    General: Alert and oriented, No acute distress.  Head: Normocephalic, Atraumatic.  Eye: Pupils are equal, round and reactive to light, Extraocular movements are intact, Sclera non-icteric.  Ears/Nose/Throat: Normal, Mucosa moist,Clear.  Neck/Thyroid: Supple, Non-tender, No carotid bruit, No lymphadenopathy, No JVD, Full range of motion.  Respiratory: Clear to auscultation bilaterally; No wheezes, rales or rhonchi,Non-labored respirations, Symmetrical chest wall expansion.  Cardiovascular: Regular rate and rhythm, S1/S2 normal, No murmurs, rubs or gallops.  Gastrointestinal: Soft, Non-tender, Non-distended, Normal bowel sounds, No palpable organomegaly.  Musculoskeletal: Normal range of motion.  Integumentary: Warm, Dry, Intact, No suspicious lesions or rashes.  Extremities: No clubbing, cyanosis or edema  Neurologic: No focal deficits, Cranial Nerves II-XII are grossly intact, Motor strength normal upper and lower extremities, Sensory exam intact.  Psychiatric: Normal interaction, Coherent speech, Euthymic mood, Appropriate affect     Labs Reviewed:     Chemistry:  Lab Results   Component Value Date     08/20/2024    K 3.7 08/20/2024    BUN 16.3 08/20/2024    CREATININE 0.77 08/20/2024    EGFRNORACEVR >60 08/20/2024    GLUCOSE 119 (H) 08/20/2024    CALCIUM 9.8 08/20/2024    ALKPHOS 81 08/20/2024    LABPROT 7.0 08/20/2024    ALBUMIN 3.9 08/20/2024    BILIDIR 0.2 04/07/2022 "    IBILI 0.20 04/07/2022    AST 26 08/20/2024    ALT 26 08/20/2024    MG 1.8 03/15/2018    [id number] 32 08/20/2024    TSH 3.015 08/20/2024    HMJYLM7NULV 0.91 08/20/2024        Lab Results   Component Value Date    HGBA1C 6.6 08/20/2024        Hematology:  Lab Results   Component Value Date    WBC 6.37 08/20/2024    HGB 13.4 08/20/2024    HCT 42.9 08/20/2024     08/20/2024       Lipid Panel:  Lab Results   Component Value Date    CHOL 200 08/20/2024    HDL 66 (H) 08/20/2024    .00 08/20/2024    TRIG 56 08/20/2024    TOTALCHOLEST 3 08/20/2024        Urine:  Lab Results   Component Value Date    APPEARANCEUA Clear 08/22/2024    SGUA 1.025 08/22/2024    PROTEINUA Negative 08/22/2024    KETONESUA Trace (A) 08/22/2024    LEUKOCYTESUR Negative 08/22/2024    RBCUA None Seen 08/22/2024    WBCUA 6-10 (A) 08/22/2024    BACTERIA Moderate (A) 08/22/2024    SQEPUA Few (A) 10/19/2023    HYALINECASTS 0-2 (A) 10/19/2023    CREATRANDUR 151.4 (H) 08/20/2024          Assessment:       ICD-10-CM ICD-9-CM   1. Dysuria  R30.0 788.1   2. Type 2 diabetes mellitus with hyperglycemia, unspecified whether long term insulin use  E11.65 250.00   3. Anxiety  F41.9 300.00        Plan:   1. Well adult exam  Healthy lifestyle discussed. Cervical cancer screening scheduled for September 9, 2024.    2. Dysuria  - Urinalysis, Reflex to Urine Culture  - Urinalysis, Microscopic    3. Type 2 diabetes mellitus with hyperglycemia, unspecified whether long term insulin use  Lab Results   Component Value Date    HGBA1C 6.6 08/20/2024    HGBA1C 7.0 05/20/2024    .00 08/20/2024    CREATININE 0.77 08/20/2024     Jardiance decreased to 10 mg p.o. daily due to recurrent urinary tract infections.  Add Tirzepatide 2.5 mg subQ weekly.  Diabetes Medications               empagliflozin (JARDIANCE) 10 mg tablet Take 1 tablet (10 mg total) by mouth once daily.    tirzepatide (MOUNJARO) 2.5 mg/0.5 mL PnIj Inject 2.5 mg into the skin every 7 days.         Follow ADA Diet. Avoid soda, simple sweets, and limit rice/pasta/breads/starches (no more than 45-50 grams per meal).  Maintain healthy weight with goal BMI <30.  Exercise 5 times per week for 30 minutes per day.  Stressed importance of daily foot exams.  Stressed importance of annual dilated eye exam.    - empagliflozin (JARDIANCE) 10 mg tablet; Take 1 tablet (10 mg total) by mouth once daily.  Dispense: 30 tablet; Refill: 6  - tirzepatide (MOUNJARO) 2.5 mg/0.5 mL PnIj; Inject 2.5 mg into the skin every 7 days.  Dispense: 0.5 mL; Refill: 1    4. Anxiety  Not at goal.  Start buspirone 5 mg p.o. b.i.d..  Practice deep breathing or abdominal breathing exercises when anxiety occurs.  Exercise daily. Get sunlight daily.  Avoid caffeine, alcohol and stimulants.  Practice positive phrases and repeat throughout the day, along with yoga and relaxation techniques.  Set healthy boundaries, avoid people and conversations that increase stress.  Educated patient on the risks of serotonin based medications such as serotonin modulators and SSRIs/SNRIs including common side effects of nausea, GI upset, headache dizziness as well as the rare risk for worsening symptoms of depression including development of suicidal thoughts or ideations, and serotonin syndrome.   Discussed benefits of medication not becoming noticeable until up to 6 weeks from start date.   Reports any symptoms of suicidal or homicidal ideations immediately, if clinic is closed go to nearest emergency room.    - busPIRone (BUSPAR) 5 MG Tab; Take 1 tablet (5 mg total) by mouth 2 (two) times daily.  Dispense: 60 tablet; Refill: 5    5. Class 2 severe obesity with serious comorbidity and body mass index (BMI) of 35.0 to 35.9 in adult, unspecified obesity type  Body mass index is 35.74 kg/m².  Goal BMI <30.  Exercise 5 times a week for 30 minutes per day.  Avoid soda, simple sugars, excessive rice, potatoes or bread. Limit fast foods and fried foods.  Choose  complex carbs in moderation (example: green vegetables, beans, oatmeal). Eat plenty of fresh fruits and vegetables with lean meats daily.  Do not skip meals. Eat a balanced portion size.  Avoid fad diets. Consider permanent healthy life style changes.     I offered to discuss advanced care planning, including how to pick a person who would make decisions for you if you were unable to make them for yourself, called a health care power of , and what kind of decisions you might make such as use of life sustaining treatments such as ventilators and tube feeding when faced with a life limiting illness.    Follow up in about 6 weeks (around 10/3/2024) for Anxiety/Depression. In addition to their scheduled follow up, the patient has also been instructed to follow up on as needed basis.     Future Appointments   Date Time Provider Department Center   10/18/2024 11:00 AM Kriss Kolb FNP [facility]   8/27/2025  1:30 PM Kriss Kolb FNP [facility]        KHAI Zambrano

## 2024-08-29 ENCOUNTER — TELEPHONE (OUTPATIENT)
Dept: FAMILY MEDICINE | Facility: CLINIC | Age: 59
End: 2024-08-29
Payer: MEDICARE

## 2024-09-15 RX ORDER — EZETIMIBE 10 MG/1
10 TABLET ORAL EVERY MORNING
Qty: 90 TABLET | Refills: 3 | Status: SHIPPED | OUTPATIENT
Start: 2024-09-15

## 2024-09-16 DIAGNOSIS — Z12.31 ENCOUNTER FOR SCREENING MAMMOGRAM FOR MALIGNANT NEOPLASM OF BREAST: Primary | ICD-10-CM

## 2024-09-16 DIAGNOSIS — Z01.419 ENCOUNTER FOR GYNECOLOGICAL EXAMINATION WITHOUT ABNORMAL FINDING: ICD-10-CM

## 2024-10-11 DIAGNOSIS — E11.65 TYPE 2 DIABETES MELLITUS WITH HYPERGLYCEMIA, UNSPECIFIED WHETHER LONG TERM INSULIN USE: ICD-10-CM

## 2024-10-14 RX ORDER — TIRZEPATIDE 2.5 MG/.5ML
2.5 INJECTION, SOLUTION SUBCUTANEOUS WEEKLY
Qty: 4 ML | Refills: 0 | Status: SHIPPED | OUTPATIENT
Start: 2024-10-14 | End: 2024-10-18

## 2024-10-18 ENCOUNTER — PATIENT OUTREACH (OUTPATIENT)
Facility: CLINIC | Age: 59
End: 2024-10-18
Payer: MEDICARE

## 2024-10-18 ENCOUNTER — OFFICE VISIT (OUTPATIENT)
Dept: FAMILY MEDICINE | Facility: CLINIC | Age: 59
End: 2024-10-18
Payer: MEDICARE

## 2024-10-18 VITALS
DIASTOLIC BLOOD PRESSURE: 69 MMHG | HEART RATE: 58 BPM | WEIGHT: 218 LBS | BODY MASS INDEX: 36.32 KG/M2 | HEIGHT: 65 IN | OXYGEN SATURATION: 97 % | SYSTOLIC BLOOD PRESSURE: 125 MMHG | TEMPERATURE: 98 F | RESPIRATION RATE: 18 BRPM

## 2024-10-18 DIAGNOSIS — E11.65 TYPE 2 DIABETES MELLITUS WITH HYPERGLYCEMIA, UNSPECIFIED WHETHER LONG TERM INSULIN USE: Primary | ICD-10-CM

## 2024-10-18 DIAGNOSIS — Z23 FLU VACCINE NEED: ICD-10-CM

## 2024-10-18 DIAGNOSIS — F41.9 ANXIETY: ICD-10-CM

## 2024-10-18 DIAGNOSIS — E66.9 OBESITY, UNSPECIFIED CLASS, UNSPECIFIED OBESITY TYPE, UNSPECIFIED WHETHER SERIOUS COMORBIDITY PRESENT: ICD-10-CM

## 2024-10-18 DIAGNOSIS — R82.90 MALODOROUS URINE: ICD-10-CM

## 2024-10-18 LAB
BACTERIA #/AREA URNS AUTO: ABNORMAL /HPF
BILIRUB UR QL STRIP.AUTO: NEGATIVE
CLARITY UR: CLEAR
COLOR UR AUTO: ABNORMAL
GLUCOSE UR QL STRIP: ABNORMAL
HGB UR QL STRIP: NEGATIVE
KETONES UR QL STRIP: NEGATIVE
LEUKOCYTE ESTERASE UR QL STRIP: NEGATIVE
NITRITE UR QL STRIP: NEGATIVE
PH UR STRIP: 5.5 [PH]
PROT UR QL STRIP: NEGATIVE
RBC #/AREA URNS AUTO: ABNORMAL /HPF
SP GR UR STRIP.AUTO: 1.02 (ref 1–1.03)
SQUAMOUS #/AREA URNS AUTO: ABNORMAL /HPF
UROBILINOGEN UR STRIP-ACNC: 0.2
WBC #/AREA URNS AUTO: ABNORMAL /HPF

## 2024-10-18 PROCEDURE — 3074F SYST BP LT 130 MM HG: CPT | Mod: ,,, | Performed by: NURSE PRACTITIONER

## 2024-10-18 PROCEDURE — G2211 COMPLEX E/M VISIT ADD ON: HCPCS | Mod: ,,, | Performed by: NURSE PRACTITIONER

## 2024-10-18 PROCEDURE — 81003 URINALYSIS AUTO W/O SCOPE: CPT | Performed by: NURSE PRACTITIONER

## 2024-10-18 PROCEDURE — 3078F DIAST BP <80 MM HG: CPT | Mod: ,,, | Performed by: NURSE PRACTITIONER

## 2024-10-18 PROCEDURE — 87186 SC STD MICRODIL/AGAR DIL: CPT | Performed by: NURSE PRACTITIONER

## 2024-10-18 PROCEDURE — 3044F HG A1C LEVEL LT 7.0%: CPT | Mod: ,,, | Performed by: NURSE PRACTITIONER

## 2024-10-18 PROCEDURE — G0008 ADMIN INFLUENZA VIRUS VAC: HCPCS | Mod: ,,, | Performed by: NURSE PRACTITIONER

## 2024-10-18 PROCEDURE — 87077 CULTURE AEROBIC IDENTIFY: CPT | Performed by: NURSE PRACTITIONER

## 2024-10-18 PROCEDURE — 81001 URINALYSIS AUTO W/SCOPE: CPT | Performed by: NURSE PRACTITIONER

## 2024-10-18 PROCEDURE — 87086 URINE CULTURE/COLONY COUNT: CPT | Performed by: NURSE PRACTITIONER

## 2024-10-18 PROCEDURE — 99215 OFFICE O/P EST HI 40 MIN: CPT | Mod: ,,, | Performed by: NURSE PRACTITIONER

## 2024-10-18 PROCEDURE — 3066F NEPHROPATHY DOC TX: CPT | Mod: ,,, | Performed by: NURSE PRACTITIONER

## 2024-10-18 PROCEDURE — 4010F ACE/ARB THERAPY RXD/TAKEN: CPT | Mod: ,,, | Performed by: NURSE PRACTITIONER

## 2024-10-18 PROCEDURE — 90656 IIV3 VACC NO PRSV 0.5 ML IM: CPT | Mod: ,,, | Performed by: NURSE PRACTITIONER

## 2024-10-18 PROCEDURE — 3061F NEG MICROALBUMINURIA REV: CPT | Mod: ,,, | Performed by: NURSE PRACTITIONER

## 2024-10-18 PROCEDURE — 3008F BODY MASS INDEX DOCD: CPT | Mod: ,,, | Performed by: NURSE PRACTITIONER

## 2024-10-18 RX ORDER — GLIMEPIRIDE 1 MG/1
1 TABLET ORAL
Qty: 30 TABLET | Refills: 3 | Status: SHIPPED | OUTPATIENT
Start: 2024-10-18 | End: 2025-10-18

## 2024-10-18 RX ORDER — TIRZEPATIDE 5 MG/.5ML
5 INJECTION, SOLUTION SUBCUTANEOUS
Qty: 2 ML | Refills: 6 | Status: SHIPPED | OUTPATIENT
Start: 2024-10-18

## 2024-10-18 NOTE — PROGRESS NOTES
Patient ID: [id number]     Chief Complaint: Anxiety (6 wk fu) and Depression      HPI:     Heather Arteaga is a 58 y.o. female here today for a follow up. She also presents with complaints of urinary odor.  Denies fever, hematuria, pyuria, flank pain or vaginal discharge.    Past Medical History:  has a past medical history of Acute pulmonary embolus, Carpal tunnel syndrome, De Quervain's tenosynovitis, Deep vein thrombosis, Diabetes mellitus, type 2, Diabetic neuropathy, Fibroid uterus, Hyperlipidemia, Hypertension, Lumbar back pain with radiculopathy affecting lower extremity, Lumbar spondylitis, Personal history of colonic polyps, and Vitamin D deficiency.    Surgical History:  has a past surgical history that includes Left Femur Sx; Collarbone SX; Repair of meniscus of knee; Neck surgery; Back surgery; Fracture surgery (Right, 05/1/2009); Spine surgery (5/13/2022); Cholecystectomy (2002); Colonoscopy (04/11/2022); ORIF hip fracture (Right); Tonsillectomy; gastric sleeve; and Carpal tunnel release (Left, 4/25/2024).    Family History: family history includes COPD in her mother; Coronary artery disease in her father and mother; Diabetes in her father; Heart disease in her sister.    Social History:  reports that she has quit smoking. Her smoking use included cigarettes. She has never used smokeless tobacco. She reports that she does not currently use alcohol. She reports current drug use. Drug: Hydrocodone.    Current Outpatient Medications   Medication Instructions    aspirin 81 mg, Daily    busPIRone (BUSPAR) 5 mg, Oral, 2 times daily    calcium citrate-vitamin D3 315-200 mg (CITRACAL+D) 315 mg-5 mcg (200 unit) per tablet 1 tablet, Daily    chlorthalidone (HYGROTEN) 25 mg, Daily    ezetimibe (ZETIA) 10 mg, Oral, Every morning    gabapentin (NEURONTIN) 600 MG tablet 1 tablet, As needed (PRN)    glimepiride (AMARYL) 1 mg, Oral, With breakfast    HYDROcodone-acetaminophen (NORCO)  mg per tablet 1 tablet,  "Every 4 hours PRN    levothyroxine (SYNTHROID) 50 mcg, Oral, Before breakfast    lisinopriL (PRINIVIL,ZESTRIL) 2.5 mg, Daily    MOUNJARO 5 mg, Subcutaneous, Every 7 days    MOVANTIK 25 mg, As needed (PRN)    naloxone (NARCAN) 4 mg/actuation Spry SMARTSI Spray(s) Both Nares    REPATHA PUSHTRONEX 420 mg/3.5 mL Injt inject 3.5 mls sub-q once per month       Patient is allergic to atorvastatin, rosuvastatin, coenzyme q10, pioglitazone, buprenorphine, and metformin.     Patient Care Team:  Kriss Kolb FNP as PCP - General (Family Medicine)       Subjective:     Review of Systems    12 point review of systems conducted, negative except as stated in the history of present illness. See HPI for details.      Objective:     Visit Vitals  /69 (BP Location: Left arm, Patient Position: Sitting)   Pulse (!) 58   Temp 97.8 °F (36.6 °C) (Oral)   Resp 18   Ht 5' 5" (1.651 m)   Wt 98.9 kg (218 lb)   SpO2 97%   BMI 36.28 kg/m²       Physical Exam    General: Alert and oriented, No acute distress.  Head: Normocephalic, Atraumatic.  Eye: Pupils are equal, round and reactive to light, Extraocular movements are intact, Sclera non-icteric.  Ears/Nose/Throat: Normal, Mucosa moist,Clear.  Neck/Thyroid: Supple, Non-tender, No carotid bruit, No lymphadenopathy, No JVD, Full range of motion.  Respiratory: Clear to auscultation bilaterally; No wheezes, rales or rhonchi,Non-labored respirations, Symmetrical chest wall expansion.  Cardiovascular: Regular rate and rhythm, S1/S2 normal, No murmurs, rubs or gallops.  Gastrointestinal: Soft, Non-tender, Non-distended, Normal bowel sounds, No palpable organomegaly.  Musculoskeletal: Normal range of motion.  Integumentary: Warm, Dry, Intact, No suspicious lesions or rashes.  Extremities: No clubbing, cyanosis or edema  Neurologic: No focal deficits, Cranial Nerves II-XII are grossly intact, Motor strength normal upper and lower extremities, Sensory exam intact.  Psychiatric: Normal " interaction, Coherent speech, Euthymic mood, Appropriate affect     Labs Reviewed:     Chemistry:  Lab Results   Component Value Date     08/20/2024    K 3.7 08/20/2024    BUN 16.3 08/20/2024    CREATININE 0.77 08/20/2024    EGFRNORACEVR >60 08/20/2024    GLUCOSE 119 (H) 08/20/2024    CALCIUM 9.8 08/20/2024    ALKPHOS 81 08/20/2024    LABPROT 7.0 08/20/2024    ALBUMIN 3.9 08/20/2024    BILIDIR 0.2 04/07/2022    IBILI 0.20 04/07/2022    AST 26 08/20/2024    ALT 26 08/20/2024    MG 1.8 03/15/2018    [id number] 32 08/20/2024    TSH 3.015 08/20/2024    [id number] 0.91 08/20/2024        Lab Results   Component Value Date    HGBA1C 6.6 08/20/2024        Hematology:  Lab Results   Component Value Date    WBC 6.37 08/20/2024    HGB 13.4 08/20/2024    HCT 42.9 08/20/2024     08/20/2024       Lipid Panel:  Lab Results   Component Value Date    CHOL 200 08/20/2024    HDL 66 (H) 08/20/2024    .00 08/20/2024    TRIG 56 08/20/2024    TOTALCHOLEST 3 08/20/2024        Urine:  Lab Results   Component Value Date    APPEARANCEUA Clear 10/18/2024    SGUA 1.025 10/18/2024    PROTEINUA Negative 10/18/2024    KETONESUA Negative 10/18/2024    LEUKOCYTESUR Negative 10/18/2024    RBCUA None Seen 10/18/2024    WBCUA 21-50 (A) 10/18/2024    BACTERIA Many (A) 10/18/2024    SQEPUA Few (A) 10/19/2023    HYALINECASTS 0-2 (A) 10/19/2023    CREATRANDUR 151.4 (H) 08/20/2024          Assessment:       ICD-10-CM ICD-9-CM   1. Type 2 diabetes mellitus with hyperglycemia, unspecified whether long term insulin use  E11.65 250.00   2. Anxiety  F41.9 300.00   3. Obesity, unspecified class, unspecified obesity type, unspecified whether serious comorbidity present  E66.9 278.00   4. Flu vaccine need  Z23 V04.81   5. Malodorous urine  R82.90 791.9        Plan:   1. Anxiety (Primary)  Controlled.  Continue buspirone 5 mg p.o. b.i.d..  Practice deep breathing or abdominal breathing exercises when anxiety occurs.  Exercise daily. Get  sunlight daily.  Avoid caffeine, alcohol and stimulants.  Practice positive phrases and repeat throughout the day, along with yoga and relaxation techniques.  Set healthy boundaries, avoid people and conversations that increase stress.  Educated patient on the risks of serotonin based medications such as serotonin modulators and SSRIs/SNRIs including common side effects of nausea, GI upset, headache dizziness as well as the rare risk for worsening symptoms of depression including development of suicidal thoughts or ideations, and serotonin syndrome.   Discussed benefits of medication not becoming noticeable until up to 6 weeks from start date.   Reports any symptoms of suicidal or homicidal ideations immediately, if clinic is closed go to nearest emergency room.    2. Type 2 diabetes mellitus with hyperglycemia, unspecified whether long term insulin use  Lab Results   Component Value Date    HGBA1C 6.6 08/20/2024    HGBA1C 7.0 05/20/2024    .00 08/20/2024    CREATININE 0.77 08/20/2024   Improving. Tirzepatide increased to 5mg SQ weekly.   Follow ADA Diet. Avoid soda, simple sweets, and limit rice/pasta/breads/starches (no more than 45-50 grams per meal).  Maintain healthy weight with goal BMI <30.  Exercise 5 times per week for 30 minutes per day.  Stressed importance of daily foot exams.  Stressed importance of annual dilated eye exam.    Diabetes Medications               glimepiride (AMARYL) 1 MG tablet Take 1 tablet (1 mg total) by mouth daily with breakfast.    tirzepatide (MOUNJARO) 5 mg/0.5 mL PnIj Inject 5 mg into the skin every 7 days.          3. Malodorous urine  - Urinalysis, Reflex to Urine Culture  - Urinalysis, Microscopic  - Urine culture    4. Flu vaccine need  - influenza (Flulaval, Fluzone, Fluarix) 45 mcg/0.5 mL IM vaccine (> or = 6 mo) 0.5 mL    5. Obesity, unspecified class, unspecified obesity type, unspecified whether serious comorbidity present  Body mass index is 36.28 kg/m².  Goal  BMI <30.  Exercise 5 times a week for 30 minutes per day.  Avoid soda, simple sugars, excessive rice, potatoes or bread. Limit fast foods and fried foods.  Choose complex carbs in moderation (example: green vegetables, beans, oatmeal). Eat plenty of fresh fruits and vegetables with lean meats daily.  Do not skip meals. Eat a balanced portion size.  Avoid fad diets. Consider permanent healthy life style changes.            Follow up in about 3 months (around 1/18/2025) for DM. In addition to their scheduled follow up, the patient has also been instructed to follow up on as needed basis.     Future Appointments   Date Time Provider Department Center   1/27/2025  8:45 AM Kriss Kolb FNP [facility]   8/27/2025  1:30 PM Kriss Kolb FNP [facility]        KHAI Zambrano

## 2024-10-18 NOTE — LETTER
AUTHORIZATION FOR RELEASE OF CONFIDENTIAL INFORMATION      10/18/2024      Dear Corrie Valdez,    We are seeing Heather Arteaga, date of birth 1965, in the clinic at [facility].  Kriss Kolb FNP is the patient's PCP. Heather Arteaga has an outstanding lab/procedure at the time we reviewed his chart.  In order to help keep her health information updated, Heather has authorized us to request the following medical record(s):             Pap Smear           Please fax any records to Kriss Kolb FNP's at  [phone]    If you have any questions, please contact  [employer] [facility] [employer] @ [phone]             Patient Name: Heather Arteaga  :1965  Patient Phone #:[phone]        [facility] @ [phone]                  Heather Arteaga  MRN: [id number]  : 1965  Age: 57 y.o.  Sex: female         Patient/Legal Guardian Signature  This signature was collected at 2023           _______________________________   Printed Name/Relationship to Patient      Consent for Examination and Treatment: I hereby authorize the providers and employees of [facility] ([facility]) to provide medical treatment/services which includes, but is not limited to, performing and administering tests and diagnostic procedures that are deemed necessary, including, but not limited to, imaging examinations, blood tests and other laboratory procedures as may be required by the hospital, clinic, or may be ordered by my physician(s) or persons working under the general and/or special instructions of my physician(s).      I understand and agree that this consent covers all authorized persons, including but not limited to physicians, residents, nurse practitioners, physicians' assistants, specialists, consultants, student nurses, and independently contracted physicians, who are called upon by the physician in charge, to carry out the diagnostic procedures and medical or surgical treatment.     I  hereby authorize [facility] to retain or dispose of any specimens or tissue, should there be such remaining from any test or procedure.     I hereby authorize and give consent for [facility] providers and employees to take photographs, images or videotapes of such diagnostic, surgical or treatment procedures of Patient as may be required by [facility] or as may be ordered by a physician. I further acknowledge and agree that [facility] may use cameras or other devices for patient monitoring.     I am aware that the practice of medicine is not an exact science, and I acknowledge that no guarantees have been made to me as to the outcome of any tests, procedures or treatment.     Authorization for Release of Information: I understand that my insurance company and/or their agents may need information necessary to make determinations about payment/reimbursement. I hereby provide authorization to release to all insurance companies, their successors, assignees, other parties with whom they may have contracted, or others acting on their behalf, that are involved with payment for any hospital and/or clinic charges incurred by the patient, any information that they request and deem necessary for payment/reimbursement, and/or quality review.  I further authorize the release of my health information to physicians or other health care practitioners on staff who are involved in my health care now and in the future, and to other health care providers, entities, or institutions for the purpose of my continued care and treatment, including referrals.     REGISTRATION AUTHORIZATION  Form No. 98001 (Rev. 7/13/2022)       Medicare Patient's Certification and Authorization to Release Information and Payment Request:  I certify that the information given by me in applying for payment under Title XVIII of the Social Security Act is correct. I authorize any pradhan of medical or other information about me to release to the Social SecurityAdministration,  or its intermediaries or carriers, any information needed for this or a related Medicare claim. I request that payment of authorized benefits be made on my behalf.     Assignment of Insurance Benefits:   I hereby authorize any and all insurance companies, health plans, defined   benefit plans, health insurers or any entity that is or may be responsible for payment of my medical expenses to pay all hospital and medical benefits now due, and to become due and payable to me under any hospital benefits, sick benefits, injury benefits or any other benefit for services rendered to me, including Major Medical Benefits, direct to [facility] and all independently contracted physicians. I assign any and all rights that I may have against any and all insurance companies, health plans, defined benefit plans, health insurers or any entity that is or may be responsible for payment of my medical expenses, including, but not limited to any right to appeal a denial of a claim, any right to bring any action, lawsuit, administrative proceeding, or other cause of action on my behalf. I specifically assign my right to pursue litigation against any and all insurance companies, health plans, defined benefit plans, health insurers or any entity that is or may be responsible for payment of my medical expenses based upon a refusal to pay charges.            E. Valuables: It is understood and agreed that [facility] is not liable for the damage to or loss of any money, jewelry,   documents, dentures, eye glasses, hearing aids, prosthetics, or other property of value.     F. Computer Equipment: I understand and agree that should I choose to use computer equipment owned by [facility] or if I choose to access the Internet via [facility]s network, I do so at my own risk. [facility] is not responsible for any damage to my computer equipment or to any damages of any type that might arise from my loss of equipment or data.     G. Acceptance of Financial  Responsibility:  I agree that in consideration of the services and   supplies that have been   or will be furnished to the patient, I am hereby obligated to pay all charges made for or on the account of the patient according to the standard rates (in effect at the time the services and supplies are delivered) established by [facility], including its Patient Financial Assistance Policy to the extent it is applicable. I understand that I am responsible for all charges, or portions thereof, not covered by insurance or other sources. Patient refunds will be distributed only after balances at all [facility] facilities are paid.     H. Communication Authorization:  I hereby authorize [facility] and its representatives, along with any billing service   or  who may work on their behalf, to contact me on   my cell phone and/or home phone using pre- recorded messages, artificial voice messages, automatic telephone dialing devices or other computer assisted technology, or by electronic      mail, text messaging, or by any other form of electronic communication. This includes, but is not limited to, appointment reminders, yearly physical exam reminders, preventive care reminders, patient campaigns, welcome calls, and calls about account balances on my account or any account on which I am listed as a guarantor. I understand I have the right to opt out of these communications at any time.      Relationship  Between  Facility and  Provider:      I understand that some, but not all, providers furnishing services to the patient are not employees or agents of [facility]. The patient is under the care and supervision of his/her attending physician, and it is the responsibility of the facility and its nursing staff to carry out the instructions of such physicians. It is the responsibility of the patient's physician/designee to obtain the patient's informed consent, when required, for medical or surgical treatment, special  diagnostic or therapeutic procedures, or hospital services rendered for the patient under the special instructions of the physician/designee.     REGISTRATION AUTHORIZATION  Form No. 91527 (Rev. 7/13/2022)      Notice of Privacy Practices: I acknowledge I have received a copy of [facility]'s Notice of Privacy Practices.     Facility  Directory: I have discussed with the organization my desire to be either included or excluded  in the facility directory in the event of my being an inpatient at an [facility] facility. I understand that if my choice is to opt-out of being identified in the facility directory that the facility will not provide any information about me such as my condition (e.g. fair, stable, etc.) or my location in the facility (e.g., room number, department).     Immunizations: [facility] shares immunization information with state sponsored health departments to help you and your doctor keep track of your immunization records. By signing, you consent to have this information shared with the health department in your state:                                [place] - LINKS ([place] Immunization Network for Kids Statewide)                                [place] - MIIX ([place] Immunization Information eXchange)                                [place] - ImmPRINT (Immunization Patient Registry with Integrated Technology)     TERM: This authorization is valid for this and subsequent care/treatment I receive at [facility] and will remain valid unless/until revoked in writing by me.     [facility]: As used in this document, [facility] means all [facility] owned and managed facilities, including, but not limited to, all health centers, surgery centers, clinics, urgent care centers, and hospitals.         [facility] complies with applicable Federal civil rights laws and does not discriminate on the basis of race, color, national origin, age, disability, or sex.  ATENCIÓN: roldan ruggiero,  tiene a osborne disposición servicios gratuitos de asistencia lingüística. Pj al [phone].  STONE Ý: N?u b?n nói Ti?ng Vi?t, có các d?ch v? h? tr? ngôn ng? mi?n phí dành cho b?n. G?i s? [phone].        REGISTRATION AUTHORIZATION  Form No. 56698 (Rev. 7/13/2022)

## 2024-10-18 NOTE — PROGRESS NOTES
Health Maintenance Topic(s) Outreach Outcomes & Actions Taken:    Cervical Cancer Screening - Outreach Outcomes & Actions Taken  : External Records Requested & Care Team Updated if Applicable     Additional Notes:  Request Pap Smear Report: Tien Valdez

## 2024-10-21 ENCOUNTER — PATIENT OUTREACH (OUTPATIENT)
Dept: ADMINISTRATIVE | Facility: HOSPITAL | Age: 59
End: 2024-10-21
Payer: MEDICARE

## 2024-10-21 DIAGNOSIS — N30.00 ACUTE CYSTITIS WITHOUT HEMATURIA: Primary | ICD-10-CM

## 2024-10-21 LAB — BACTERIA UR CULT: ABNORMAL

## 2024-10-21 RX ORDER — NITROFURANTOIN 25; 75 MG/1; MG/1
100 CAPSULE ORAL 2 TIMES DAILY
Qty: 14 CAPSULE | Refills: 0 | Status: SHIPPED | OUTPATIENT
Start: 2024-10-21 | End: 2024-10-28

## 2024-11-01 RX ORDER — CHLORTHALIDONE 25 MG/1
25 TABLET ORAL DAILY
Qty: 30 TABLET | Refills: 11 | Status: SHIPPED | OUTPATIENT
Start: 2024-11-01

## 2024-11-01 RX ORDER — LISINOPRIL 2.5 MG/1
2.5 TABLET ORAL DAILY
Qty: 30 TABLET | Refills: 11 | Status: SHIPPED | OUTPATIENT
Start: 2024-11-01

## 2024-11-01 RX ORDER — LEVOTHYROXINE SODIUM 50 UG/1
50 TABLET ORAL
Qty: 30 TABLET | Refills: 11 | Status: SHIPPED | OUTPATIENT
Start: 2024-11-01 | End: 2025-11-01

## 2024-11-06 LAB
LEFT EYE DM RETINOPATHY: POSITIVE
RIGHT EYE DM RETINOPATHY: POSITIVE

## 2024-11-07 ENCOUNTER — TELEPHONE (OUTPATIENT)
Dept: FAMILY MEDICINE | Facility: CLINIC | Age: 59
End: 2024-11-07
Payer: MEDICARE

## 2024-11-07 DIAGNOSIS — E11.65 TYPE 2 DIABETES MELLITUS WITH HYPERGLYCEMIA, UNSPECIFIED WHETHER LONG TERM INSULIN USE: Primary | ICD-10-CM

## 2024-11-07 RX ORDER — INSULIN PUMP SYRINGE, 3 ML
EACH MISCELLANEOUS
Qty: 1 EACH | Refills: 0 | Status: SHIPPED | OUTPATIENT
Start: 2024-11-07

## 2024-11-07 RX ORDER — LANCETS
EACH MISCELLANEOUS
Qty: 50 EACH | Refills: 11 | Status: SHIPPED | OUTPATIENT
Start: 2024-11-07

## 2024-11-11 ENCOUNTER — DOCUMENTATION ONLY (OUTPATIENT)
Dept: FAMILY MEDICINE | Facility: CLINIC | Age: 59
End: 2024-11-11
Payer: MEDICARE

## 2024-11-13 ENCOUNTER — TELEPHONE (OUTPATIENT)
Dept: FAMILY MEDICINE | Facility: CLINIC | Age: 59
End: 2024-11-13
Payer: MEDICARE

## 2024-11-13 NOTE — TELEPHONE ENCOUNTER
Informed pt that Ms. Monterroso, as an NP, cannot write orders for diabetic shoes. She would need orders from a physician or a podiatrist. Understanding voiced. ----- Message from Jay Jay sent at 11/13/2024  9:35 AM CST -----  Who Called: Heather MARY GRACE Arteaga    Caller is requesting assistance/information from provider's office.    Symptoms (please be specific):    How long has patient had these symptoms:    List of preferred pharmacies on file (remove unneeded): [unfilled]  If different, enter pharmacy into here including location and phone number:       Preferred Method of Contact: Phone Call  Patient's Preferred Phone Number on File: [phone]   Best Call Back Number, if different:  Additional Information: pt would like to speak with nurse about ordering pt some DB shoes

## 2024-11-14 ENCOUNTER — PATIENT OUTREACH (OUTPATIENT)
Dept: ADMINISTRATIVE | Facility: HOSPITAL | Age: 59
End: 2024-11-14
Payer: MEDICARE

## 2024-11-14 ENCOUNTER — OFFICE VISIT (OUTPATIENT)
Dept: FAMILY MEDICINE | Facility: CLINIC | Age: 59
End: 2024-11-14
Payer: MEDICARE

## 2024-11-14 ENCOUNTER — HOSPITAL ENCOUNTER (OUTPATIENT)
Dept: RADIOLOGY | Facility: HOSPITAL | Age: 59
Discharge: HOME | End: 2024-11-14
Attending: NURSE PRACTITIONER
Payer: MEDICARE

## 2024-11-14 VITALS
BODY MASS INDEX: 35.68 KG/M2 | DIASTOLIC BLOOD PRESSURE: 73 MMHG | HEIGHT: 65 IN | SYSTOLIC BLOOD PRESSURE: 119 MMHG | RESPIRATION RATE: 18 BRPM | WEIGHT: 214.13 LBS | TEMPERATURE: 98 F | HEART RATE: 68 BPM | OXYGEN SATURATION: 100 %

## 2024-11-14 DIAGNOSIS — E11.9 TYPE 2 DIABETES MELLITUS WITHOUT COMPLICATION, UNSPECIFIED WHETHER LONG TERM INSULIN USE: ICD-10-CM

## 2024-11-14 DIAGNOSIS — R06.02 SOB (SHORTNESS OF BREATH): ICD-10-CM

## 2024-11-14 DIAGNOSIS — R06.02 SOB (SHORTNESS OF BREATH): Primary | ICD-10-CM

## 2024-11-14 DIAGNOSIS — E11.43 DIABETIC AUTONOMIC NEUROPATHY ASSOCIATED WITH TYPE 2 DIABETES MELLITUS: ICD-10-CM

## 2024-11-14 PROCEDURE — 4010F ACE/ARB THERAPY RXD/TAKEN: CPT | Mod: ,,, | Performed by: NURSE PRACTITIONER

## 2024-11-14 PROCEDURE — 3061F NEG MICROALBUMINURIA REV: CPT | Mod: ,,, | Performed by: NURSE PRACTITIONER

## 2024-11-14 PROCEDURE — 99213 OFFICE O/P EST LOW 20 MIN: CPT | Mod: ,,, | Performed by: NURSE PRACTITIONER

## 2024-11-14 PROCEDURE — 1159F MED LIST DOCD IN RCRD: CPT | Mod: ,,, | Performed by: NURSE PRACTITIONER

## 2024-11-14 PROCEDURE — G2211 COMPLEX E/M VISIT ADD ON: HCPCS | Mod: ,,, | Performed by: NURSE PRACTITIONER

## 2024-11-14 PROCEDURE — 1160F RVW MEDS BY RX/DR IN RCRD: CPT | Mod: ,,, | Performed by: NURSE PRACTITIONER

## 2024-11-14 PROCEDURE — 3078F DIAST BP <80 MM HG: CPT | Mod: ,,, | Performed by: NURSE PRACTITIONER

## 2024-11-14 PROCEDURE — 3044F HG A1C LEVEL LT 7.0%: CPT | Mod: ,,, | Performed by: NURSE PRACTITIONER

## 2024-11-14 PROCEDURE — 3008F BODY MASS INDEX DOCD: CPT | Mod: ,,, | Performed by: NURSE PRACTITIONER

## 2024-11-14 PROCEDURE — 71046 X-RAY EXAM CHEST 2 VIEWS: CPT | Mod: TC

## 2024-11-14 PROCEDURE — 3074F SYST BP LT 130 MM HG: CPT | Mod: ,,, | Performed by: NURSE PRACTITIONER

## 2024-11-14 PROCEDURE — 3066F NEPHROPATHY DOC TX: CPT | Mod: ,,, | Performed by: NURSE PRACTITIONER

## 2024-11-14 NOTE — LETTER
AUTHORIZATION FOR RELEASE OF   CONFIDENTIAL INFORMATION      We are seeing Heather Arteaga, date of birth 1965, in the clinic at [facility]. Kriss Kolb FNP is the patient's PCP. Heather Arteaga has an outstanding lab/procedure at the time we reviewed her chart. In order to help keep her health information updated, she has authorized us to request the following medical record(s):                                                     (x )  LAST DIABETIC FOOT EXAM                                                 Please fax records to [facility], [facility], Christi D, FNP,  at [phone] or email to [email or web address].               Patient Name: Heather Arteaga  : 1965  Patient Phone #: [phone]

## 2024-11-14 NOTE — LETTER
AUTHORIZATION FOR RELEASE OF   CONFIDENTIAL INFORMATION        We are seeing Heather Arteaga, date of birth 1965, in the clinic at [facility]. Kriss Kolb FNP is the patient's PCP. Heather Arteaga has an outstanding lab/procedure at the time we reviewed her chart. In order to help keep her health information updated, she has authorized us to request the following medical record(s):                                                           ( x ) LAST  PAP SMEAR                                                  Please fax records to [facility], [facility], Christi D, FNP,  at [phone] or email to [email or web address].              Patient Name: Heather Arteaga  : 1965  Patient Phone #: [phone]

## 2024-11-14 NOTE — PROGRESS NOTES
Patient ID: [id number]     Chief Complaint: Diabetic Shoes       HPI:     Heather Arteaga is a 59 y.o. female here today for a follow up. She is requesting a new prescription for diabetic shoes. She also reports abdominal pain and sob with inhalation. Patient describes pain as a sharp poking sensation.  Symptoms began 4 days ago.      Past Medical History:  has a past medical history of Acute pulmonary embolus, Carpal tunnel syndrome, De Quervain's tenosynovitis, Deep vein thrombosis, Diabetes mellitus, type 2, Diabetic neuropathy, Fibroid uterus, Hyperlipidemia, Hypertension, Lumbar back pain with radiculopathy affecting lower extremity, Lumbar spondylitis, Personal history of colonic polyps, and Vitamin D deficiency.    Surgical History:  has a past surgical history that includes Left Femur Sx; Collarbone SX; Repair of meniscus of knee; Neck surgery; Back surgery; Fracture surgery (Right, 05/1/2009); Spine surgery (5/13/2022); Cholecystectomy (2002); Colonoscopy (04/11/2022); ORIF hip fracture (Right); Tonsillectomy; gastric sleeve; and Carpal tunnel release (Left, 4/25/2024).    Family History: family history includes COPD in her mother; Coronary artery disease in her father and mother; Diabetes in her father; Heart disease in her sister.    Social History:  reports that she has quit smoking. Her smoking use included cigarettes. She has never used smokeless tobacco. She reports that she does not currently use alcohol. She reports current drug use. Drug: Hydrocodone.    Current Outpatient Medications   Medication Instructions    aspirin 81 mg, Daily    blood sugar diagnostic Strp To check BG 1 times daily, to use with insurance preferred meter    blood-glucose meter kit To check BG 1 times daily, to use with insurance preferred meter    busPIRone (BUSPAR) 5 mg, Oral, 2 times daily    calcium citrate-vitamin D3 315-200 mg (CITRACAL+D) 315 mg-5 mcg (200 unit) per tablet 1 tablet, Daily    chlorthalidone (HYGROTEN)  "25 mg, Oral, Daily    ezetimibe (ZETIA) 10 mg, Oral, Every morning    gabapentin (NEURONTIN) 600 MG tablet 1 tablet, As needed (PRN)    glimepiride (AMARYL) 1 mg, Oral, With breakfast    HYDROcodone-acetaminophen (NORCO)  mg per tablet 1 tablet, Every 4 hours PRN    lancets Misc To check BG 1 times daily, to use with insurance preferred meter    levothyroxine (SYNTHROID) 50 mcg, Oral, Before breakfast    lisinopriL (PRINIVIL,ZESTRIL) 2.5 mg, Oral, Daily    MOUNJARO 5 mg, Subcutaneous, Every 7 days    MOVANTIK 25 mg, As needed (PRN)    naloxone (NARCAN) 4 mg/actuation Spry SMARTSI Spray(s) Both Nares    REPATHA PUSHTRONEX 420 mg/3.5 mL Injt inject 3.5 mls sub-q once per month       Patient is allergic to atorvastatin, rosuvastatin, coenzyme q10, pioglitazone, buprenorphine, and metformin.     Patient Care Team:  Kriss Kolb FNP as PCP - General (Family Medicine)  Kandis Victor DPM as Consulting Physician (Podiatry)  Corrie Valdez MD (Obstetrics and Gynecology)       Subjective:     Review of Systems    12 point review of systems conducted, negative except as stated in the history of present illness. See HPI for details.      Objective:     Visit Vitals  /73 (BP Location: Left arm, Patient Position: Sitting)   Pulse 68   Temp 98 °F (36.7 °C) (Oral)   Resp 18   Ht 5' 5" (1.651 m)   Wt 97.1 kg (214 lb 1.6 oz)   SpO2 100%   BMI 35.63 kg/m²       Physical Exam    General: Alert and oriented, No acute distress.  Head: Normocephalic, Atraumatic.  Eye: Pupils are equal, round and reactive to light, Extraocular movements are intact, Sclera non-icteric.  Ears/Nose/Throat: Normal, Mucosa moist,Clear.  Neck/Thyroid: Supple, Non-tender, No carotid bruit, No lymphadenopathy, No JVD, Full range of motion.  Respiratory: Clear to auscultation bilaterally; No wheezes, rales or rhonchi,Non-labored respirations, Symmetrical chest wall expansion.  Cardiovascular: Regular rate and rhythm, S1/S2 normal, No " murmurs, rubs or gallops.  Gastrointestinal: Soft, Non-tender, Non-distended, Normal bowel sounds, No palpable organomegaly.  Musculoskeletal: Normal range of motion.  Integumentary: Warm, Dry, Intact, No suspicious lesions or rashes.  Extremities: No clubbing, cyanosis or edema  Neurologic: No focal deficits, Cranial Nerves II-XII are grossly intact, Motor strength normal upper and lower extremities, Sensory exam intact.  Psychiatric: Normal interaction, Coherent speech, Euthymic mood, Appropriate affect     Labs Reviewed:     Chemistry:  Lab Results   Component Value Date     08/20/2024    K 3.7 08/20/2024    BUN 16.3 08/20/2024    CREATININE 0.77 08/20/2024    EGFRNORACEVR >60 08/20/2024    GLUCOSE 119 (H) 08/20/2024    CALCIUM 9.8 08/20/2024    ALKPHOS 81 08/20/2024    LABPROT 7.0 08/20/2024    ALBUMIN 3.9 08/20/2024    BILIDIR 0.2 04/07/2022    IBILI 0.20 04/07/2022    AST 26 08/20/2024    ALT 26 08/20/2024    MG 1.8 03/15/2018    VMHEHPUC88HE 32 08/20/2024    TSH 3.015 08/20/2024    UUGFTG1XMYP 0.91 08/20/2024        Lab Results   Component Value Date    HGBA1C 6.6 08/20/2024        Hematology:  Lab Results   Component Value Date    WBC 6.37 08/20/2024    HGB 13.4 08/20/2024    HCT 42.9 08/20/2024     08/20/2024       Lipid Panel:  Lab Results   Component Value Date    CHOL 200 08/20/2024    HDL 66 (H) 08/20/2024    .00 08/20/2024    TRIG 56 08/20/2024    TOTALCHOLEST 3 08/20/2024        Urine:  Lab Results   Component Value Date    APPEARANCEUA Clear 10/18/2024    SGUA 1.025 10/18/2024    PROTEINUA Negative 10/18/2024    KETONESUA Negative 10/18/2024    LEUKOCYTESUR Negative 10/18/2024    RBCUA None Seen 10/18/2024    WBCUA 21-50 (A) 10/18/2024    BACTERIA Many (A) 10/18/2024    SQEPUA Few (A) 10/19/2023    HYALINECASTS 0-2 (A) 10/19/2023    CREATRANDUR 151.4 (H) 08/20/2024          Assessment:       ICD-10-CM ICD-9-CM   1. SOB (shortness of breath)  R06.02 786.05   2. Type 2 diabetes  mellitus without complication, unspecified whether long term insulin use  E11.9 250.00   3. Diabetic autonomic neuropathy associated with type 2 diabetes mellitus  E11.43 250.60     337.1        Plan:   1. SOB (shortness of breath) (Primary)  ED precautions. CXR ordered.   - X-Ray Chest PA And Lateral; Future    2. Type 2 diabetes mellitus without complication, unspecified whether long term insulin use  - DIABETIC SHOES FOR HOME USE    3. Diabetic autonomic neuropathy associated with type 2 diabetes mellitus  - DIABETIC SHOES FOR HOME USE         Follow up if symptoms worsen or fail to improve. In addition to their scheduled follow up, the patient has also been instructed to follow up on as needed basis.     Future Appointments   Date Time Provider Department Center   12/19/2024  8:30 AM [facility] MAMMO2 SCR2 [facility] CANDIDA Zhou   1/27/2025  8:45 AM [facility]    8/27/2025  1:30 PM Kriss Kolb FNP [facility]        KHAI Zambrano

## 2024-11-28 ENCOUNTER — HOSPITAL ENCOUNTER (EMERGENCY)
Facility: HOSPITAL | Age: 59
Discharge: HOME | End: 2024-11-28
Attending: EMERGENCY MEDICINE
Payer: MEDICARE

## 2024-11-28 VITALS
HEART RATE: 69 BPM | RESPIRATION RATE: 18 BRPM | DIASTOLIC BLOOD PRESSURE: 68 MMHG | BODY MASS INDEX: 35.65 KG/M2 | TEMPERATURE: 98 F | WEIGHT: 214 LBS | HEIGHT: 65 IN | OXYGEN SATURATION: 99 % | SYSTOLIC BLOOD PRESSURE: 151 MMHG

## 2024-11-28 DIAGNOSIS — M54.2 NECK PAIN: Primary | ICD-10-CM

## 2024-11-28 PROCEDURE — 25000003 PHARM REV CODE 250: Performed by: EMERGENCY MEDICINE

## 2024-11-28 PROCEDURE — 63600175 PHARM REV CODE 636 W HCPCS: Performed by: EMERGENCY MEDICINE

## 2024-11-28 PROCEDURE — 99284 EMERGENCY DEPT VISIT MOD MDM: CPT | Mod: 25

## 2024-11-28 PROCEDURE — 96372 THER/PROPH/DIAG INJ SC/IM: CPT | Performed by: EMERGENCY MEDICINE

## 2024-11-28 RX ORDER — KETOROLAC TROMETHAMINE 30 MG/ML
30 INJECTION, SOLUTION INTRAMUSCULAR; INTRAVENOUS
Status: COMPLETED | OUTPATIENT
Start: 2024-11-28 | End: 2024-11-28

## 2024-11-28 RX ORDER — CYCLOBENZAPRINE HCL 5 MG
5 TABLET ORAL
Status: COMPLETED | OUTPATIENT
Start: 2024-11-28 | End: 2024-11-28

## 2024-11-28 RX ORDER — CYCLOBENZAPRINE HCL 5 MG
5 TABLET ORAL 3 TIMES DAILY PRN
Qty: 30 TABLET | Refills: 0 | Status: SHIPPED | OUTPATIENT
Start: 2024-11-28 | End: 2024-12-08

## 2024-11-28 RX ADMIN — KETOROLAC TROMETHAMINE 30 MG: 30 INJECTION, SOLUTION INTRAMUSCULAR at 08:11

## 2024-11-28 RX ADMIN — CYCLOBENZAPRINE HYDROCHLORIDE 5 MG: 5 TABLET, FILM COATED ORAL at 09:11

## 2024-11-28 NOTE — ED PROVIDER NOTES
Encounter Date: 11/28/2024       History     Chief Complaint   Patient presents with    Neck Pain     Pt presents with left side neck pain radiating up to head and down left side of back; onset at 0500 this AM; denies any injury;      This 59-year-old female presents to the emergency room with complaints of left-sided neck pain radiating up to her head and down into her left shoulder.  She states she thinks she slept wrong or pulled a muscle.  She has chronic back pain in his on Norco and gabapentin.       Review of patient's allergies indicates:   Allergen Reactions    Atorvastatin Swelling     FEET SWELLING    Rosuvastatin Swelling     FEET SWELLING    Coenzyme q10     Pioglitazone     Buprenorphine Rash     Other reaction(s): burn    Metformin Diarrhea     Past Medical History:   Diagnosis Date    Acute pulmonary embolus     Carpal tunnel syndrome     De Quervain's tenosynovitis     Deep vein thrombosis     Diabetes mellitus, type 2     Diabetic neuropathy     Fibroid uterus     Hyperlipidemia     Hypertension     Lumbar back pain with radiculopathy affecting lower extremity     Lumbar spondylitis     Personal history of colonic polyps     Vitamin D deficiency      Past Surgical History:   Procedure Laterality Date    BACK SURGERY      CARPAL TUNNEL RELEASE Left 4/25/2024    Procedure: RELEASE, CARPAL TUNNEL;  Surgeon: Damien Rangel MD;  Location: [facility];  Service: Orthopedics;  Laterality: Left;    CHOLECYSTECTOMY  2002    Collarbone SX      COLONOSCOPY  04/11/2022    FRACTURE SURGERY Right 05/1/2009    Total Hip Replacenwnt    gastric sleeve      Left Femur Sx      NECK SURGERY      ORIF HIP FRACTURE Right     REPAIR OF MENISCUS OF KNEE      SPINE SURGERY  5/13/2022    Back Surgery    TONSILLECTOMY       Family History   Problem Relation Name Age of Onset    Coronary artery disease Father Jailyn Arteaga     Diabetes Father Jailyn Arteaga     Coronary artery disease Mother Angela     COPD Mother Angela              Heart disease Sister Leslie Mena      Social History     Tobacco Use    Smoking status: Former     Types: Cigarettes    Smokeless tobacco: Never   Substance Use Topics    Alcohol use: Not Currently    Drug use: Yes     Types: Hydrocodone     Comment: pain mgmt; Dr. MARY GRACE Garner     Review of Systems   Constitutional:  Negative for fever.   HENT:  Negative for sore throat.    Respiratory:  Negative for shortness of breath.    Cardiovascular:  Negative for chest pain.   Gastrointestinal:  Negative for nausea.   Genitourinary:  Negative for dysuria.   Musculoskeletal:  Positive for myalgias and neck pain. Negative for back pain.   Skin:  Negative for rash.   Neurological:  Negative for weakness.   Hematological:  Does not bruise/bleed easily.       Physical Exam     Initial Vitals [24 0801]   BP Pulse Resp Temp SpO2   (!) 151/68 69 18 97.6 °F (36.4 °C) 99 %      MAP       --         Physical Exam    Nursing note and vitals reviewed.  Constitutional: She appears well-developed and well-nourished.   HENT:   Head: Normocephalic and atraumatic.   Eyes: Conjunctivae and EOM are normal. Pupils are equal, round, and reactive to light.   Neck: Neck supple.   Normal range of motion.  Cardiovascular:  Normal rate, regular rhythm, normal heart sounds and intact distal pulses.           Pulmonary/Chest: Breath sounds normal.   Abdominal: Abdomen is soft. Bowel sounds are normal.   Musculoskeletal:         General: Tenderness (left posterior neck) present. Normal range of motion.      Cervical back: Normal range of motion and neck supple.     Neurological: She is alert and oriented to person, place, and time. She has normal strength.   Skin: Skin is warm and dry. Capillary refill takes less than 2 seconds.   Psychiatric: She has a normal mood and affect. Her behavior is normal. Judgment and thought content normal.         ED Course   Procedures  Labs Reviewed - No data to display       Imaging Results    None           Medications   ketorolac injection 30 mg (has no administration in time range)   cyclobenzaprine tablet 5 mg (has no administration in time range)     Medical Decision Making                                    Clinical Impression:  Final diagnoses:  [M54.2] Neck pain (Primary)          ED Disposition Condition    Discharge Stable          ED Prescriptions       Medication Sig Dispense Start Date End Date Auth. Provider    cyclobenzaprine (FLEXERIL) 5 MG tablet Take 1 tablet (5 mg total) by mouth 3 (three) times daily as needed. 30 tablet 11/28/2024 12/8/2024 Rui Hollins MD          Follow-up Information       Follow up With Specialties Details Why Contact Info    Kriss Kolb, FNP Family Medicine Schedule an appointment as soon as possible for a visit  As [facility] [address] [facility] [address]  [phone]               Rui Hollins MD  11/28/24 3850

## 2024-12-03 DIAGNOSIS — E11.65 TYPE 2 DIABETES MELLITUS WITH HYPERGLYCEMIA, UNSPECIFIED WHETHER LONG TERM INSULIN USE: ICD-10-CM

## 2024-12-03 RX ORDER — DEXTROSE 4 G
TABLET,CHEWABLE ORAL
Qty: 1 EACH | Refills: 11 | Status: SHIPPED | OUTPATIENT
Start: 2024-12-03

## 2024-12-19 ENCOUNTER — HOSPITAL ENCOUNTER (OUTPATIENT)
Dept: RADIOLOGY | Facility: HOSPITAL | Age: 59
Discharge: HOME | End: 2024-12-19
Attending: OBSTETRICS & GYNECOLOGY
Payer: MEDICARE

## 2024-12-19 DIAGNOSIS — Z12.31 ENCOUNTER FOR SCREENING MAMMOGRAM FOR MALIGNANT NEOPLASM OF BREAST: ICD-10-CM

## 2024-12-19 PROCEDURE — 77063 BREAST TOMOSYNTHESIS BI: CPT | Mod: TC

## 2024-12-23 ENCOUNTER — TELEPHONE (OUTPATIENT)
Dept: FAMILY MEDICINE | Facility: CLINIC | Age: 59
End: 2024-12-23
Payer: MEDICARE

## 2024-12-23 NOTE — TELEPHONE ENCOUNTER
Pt advised appt needed to assess or to report to UC. Understanding voiced.----- Message from KHAI Hickman sent at 12/23/2024  4:42 PM CST -----  Regarding: RE: call back  What is itching, burning and smelling?  ----- Message -----  From: Laly Valladares LPN  Sent: 12/23/2024   4:34 PM CST  To: KHAI Zambrano  Subject: FW: call back                                    See below. Please advise.  ----- Message -----  From: Rima Fernandez  Sent: 12/23/2024   8:26 AM CST  To: [facility] Staff  Subject: call back                                        .Who Called: Heather Arteaga    Caller is requesting assistance/information from provider's office.    Symptoms (please be specific): itching burring smelling    How long has patient had these symptoms:  1 week  List of preferred pharmacies on file (remove unneeded): [id number] [address] [place] [address] AT [facility]   Phone: [phone]  Fax: [phone]          Preferred Method of Contact: Phone Call  Patient's Preferred Phone Number on File: [phone]   Best Call Back Number, if different:  Additional Information: pt requesting a call back

## 2024-12-24 ENCOUNTER — HOSPITAL ENCOUNTER (EMERGENCY)
Facility: HOSPITAL | Age: 59
Discharge: HOME | End: 2024-12-24
Attending: EMERGENCY MEDICINE
Payer: MEDICARE

## 2024-12-24 VITALS
SYSTOLIC BLOOD PRESSURE: 145 MMHG | OXYGEN SATURATION: 98 % | TEMPERATURE: 99 F | BODY MASS INDEX: 36.65 KG/M2 | DIASTOLIC BLOOD PRESSURE: 61 MMHG | HEART RATE: 73 BPM | WEIGHT: 220 LBS | HEIGHT: 65 IN | RESPIRATION RATE: 18 BRPM

## 2024-12-24 DIAGNOSIS — N39.0 URINARY TRACT INFECTION WITHOUT HEMATURIA, SITE UNSPECIFIED: Primary | ICD-10-CM

## 2024-12-24 LAB
BACTERIA #/AREA URNS AUTO: ABNORMAL /HPF
BILIRUB UR QL STRIP.AUTO: NEGATIVE
CLARITY UR: ABNORMAL
COLOR UR AUTO: YELLOW
GLUCOSE UR QL STRIP: NEGATIVE
HGB UR QL STRIP: ABNORMAL
KETONES UR QL STRIP: NEGATIVE
LEUKOCYTE ESTERASE UR QL STRIP: ABNORMAL
NITRITE UR QL STRIP: POSITIVE
PH UR STRIP: 6 [PH]
PROT UR QL STRIP: ABNORMAL
RBC #/AREA URNS AUTO: ABNORMAL /HPF
SP GR UR STRIP.AUTO: >=1.03 (ref 1–1.03)
SQUAMOUS #/AREA URNS AUTO: ABNORMAL /HPF
UROBILINOGEN UR STRIP-ACNC: 0.2
WBC #/AREA URNS AUTO: ABNORMAL /HPF

## 2024-12-24 PROCEDURE — 99283 EMERGENCY DEPT VISIT LOW MDM: CPT

## 2024-12-24 PROCEDURE — 87086 URINE CULTURE/COLONY COUNT: CPT | Performed by: EMERGENCY MEDICINE

## 2024-12-24 PROCEDURE — 81003 URINALYSIS AUTO W/O SCOPE: CPT | Performed by: EMERGENCY MEDICINE

## 2024-12-24 RX ORDER — CIPROFLOXACIN 500 MG/1
500 TABLET ORAL 2 TIMES DAILY
Qty: 20 TABLET | Refills: 0 | Status: SHIPPED | OUTPATIENT
Start: 2024-12-24 | End: 2025-01-03

## 2024-12-24 RX ORDER — PHENAZOPYRIDINE HYDROCHLORIDE 200 MG/1
200 TABLET, FILM COATED ORAL 3 TIMES DAILY
Qty: 6 TABLET | Refills: 0 | Status: SHIPPED | OUTPATIENT
Start: 2024-12-24 | End: 2025-01-03

## 2024-12-24 NOTE — ED PROVIDER NOTES
Encounter Date: 2024       History     Chief Complaint   Patient presents with    Dysuria     Painful urination times one week foul smell      This 59-year-old female presents with painful urination and foul-smelling urine for the past week.  Since July she has had for urinary tract infections.  She has diabetes but states her sugars have been running in the low 100s.       Review of patient's allergies indicates:   Allergen Reactions    Atorvastatin Swelling     FEET SWELLING    Rosuvastatin Swelling     FEET SWELLING    Coenzyme q10     Pioglitazone     Buprenorphine Rash     Other reaction(s): burn    Metformin Diarrhea     Past Medical History:   Diagnosis Date    Acute pulmonary embolus     Carpal tunnel syndrome     De Quervain's tenosynovitis     Deep vein thrombosis     Diabetes mellitus, type 2     Diabetic neuropathy     Fibroid uterus     Hyperlipidemia     Hypertension     Lumbar back pain with radiculopathy affecting lower extremity     Lumbar spondylitis     Personal history of colonic polyps     Vitamin D deficiency      Past Surgical History:   Procedure Laterality Date    BACK SURGERY      CARPAL TUNNEL RELEASE Left 2024    Procedure: RELEASE, CARPAL TUNNEL;  Surgeon: Damien Rangel MD;  Location: [facility];  Service: Orthopedics;  Laterality: Left;    CHOLECYSTECTOMY      Collarbone SX      COLONOSCOPY  2022    FRACTURE SURGERY Right 2009    Total Hip Replacenwnt    gastric sleeve      Left Femur Sx      NECK SURGERY      ORIF HIP FRACTURE Right     REPAIR OF MENISCUS OF KNEE      SPINE SURGERY  2022    Back Surgery    TONSILLECTOMY       Family History   Problem Relation Name Age of Onset    Coronary artery disease Father Jailyn Arteaga     Diabetes Father Jailyn Arteaga     Coronary artery disease Mother Angela     COPD Mother Angela             Heart disease Sister Leslie Mena      Social History     Tobacco Use    Smoking status: Former     Types:  Cigarettes    Smokeless tobacco: Never   Substance Use Topics    Alcohol use: Not Currently    Drug use: Yes     Types: Hydrocodone     Comment: pain mgmt; Dr. MARY GRACE Garner     Review of Systems   Constitutional:  Negative for fever.   HENT:  Negative for sore throat.    Respiratory:  Negative for shortness of breath.    Cardiovascular:  Negative for chest pain.   Gastrointestinal:  Negative for nausea.   Genitourinary:  Positive for dysuria.   Musculoskeletal:  Negative for back pain.   Skin:  Negative for rash.   Neurological:  Negative for weakness.   Hematological:  Does not bruise/bleed easily.       Physical Exam     Initial Vitals [12/24/24 0916]   BP Pulse Resp Temp SpO2   (!) 145/61 73 18 98.5 °F (36.9 °C) 98 %      MAP       --         Physical Exam    Nursing note and vitals reviewed.  Constitutional: She appears well-developed and well-nourished.   HENT:   Head: Normocephalic and atraumatic.   Eyes: Conjunctivae and EOM are normal. Pupils are equal, round, and reactive to light.   Neck: Neck supple.   Normal range of motion.  Cardiovascular:  Normal rate, regular rhythm, normal heart sounds and intact distal pulses.           Pulmonary/Chest: Breath sounds normal.   Abdominal: Abdomen is soft. Bowel sounds are normal.   Musculoskeletal:         General: Normal range of motion.      Cervical back: Normal range of motion and neck supple.     Neurological: She is alert and oriented to person, place, and time. She has normal strength.   Skin: Skin is warm and dry. Capillary refill takes less than 2 seconds.   Psychiatric: She has a normal mood and affect. Her behavior is normal. Judgment and thought content normal.         ED Course   Procedures  Labs Reviewed   URINALYSIS, REFLEX TO URINE CULTURE - Abnormal       Result Value    Color, UA Yellow      Appearance, UA Slightly Cloudy (*)     Specific Gravity, UA >=1.030      pH, UA 6.0      Protein, UA Trace (*)     Glucose, UA Negative      Ketones, UA Negative       Blood, UA Small (*)     Bilirubin, UA Negative      Urobilinogen, UA 0.2      Nitrites, UA Positive (*)     Leukocyte Esterase, UA Small (*)    URINALYSIS, MICROSCOPIC - Abnormal    Bacteria, UA Many (*)     RBC, UA 6-10 (*)     WBC, UA 50-99 (*)     Squamous Epithelial Cells, UA Few (*)    CULTURE, URINE          Imaging Results    None          Medications - No data to display  Medical Decision Making                                    Clinical Impression:  Final diagnoses:  [N39.0] Urinary tract infection without hematuria, site unspecified (Primary)          ED Disposition Condition    Discharge Stable          ED Prescriptions       Medication Sig Dispense Start Date End Date Auth. Provider    ciprofloxacin HCl (CIPRO) 500 MG tablet Take 1 tablet (500 mg total) by mouth 2 (two) times daily. for 10 days 20 tablet 12/24/2024 1/3/2025 Rui Hollins MD    phenazopyridine (PYRIDIUM) 200 MG tablet Take 1 tablet (200 mg total) by mouth 3 (three) times daily. for 10 days 6 tablet 12/24/2024 1/3/2025 Rui Hollins MD          Follow-up Information       Follow up With Specialties Details Why Contact Info    Kriss Kolb, P Family Medicine Schedule an appointment as soon as possible for a visit   [address] [facility] [address]  [facility] [address] [phone]      Urology-Referrals, [facility]  Schedule an appointment as soon as possible for a visit   [address] [facility] [address]  [facility] [address] [phone]               Rui Hollins MD  12/24/24 2474

## 2024-12-26 LAB — BACTERIA UR CULT: ABNORMAL

## 2025-01-10 ENCOUNTER — TELEPHONE (OUTPATIENT)
Dept: FAMILY MEDICINE | Facility: CLINIC | Age: 60
End: 2025-01-10
Payer: MEDICARE

## 2025-01-10 DIAGNOSIS — F41.9 ANXIETY: Primary | ICD-10-CM

## 2025-01-10 RX ORDER — BUSPIRONE HYDROCHLORIDE 10 MG/1
10 TABLET ORAL 2 TIMES DAILY
Qty: 60 TABLET | Refills: 5 | Status: SHIPPED | OUTPATIENT
Start: 2025-01-10 | End: 2026-01-10

## 2025-01-10 NOTE — TELEPHONE ENCOUNTER
----- Message from Walter sent at 1/10/2025  8:35 AM CST -----  Who Called: Heather Arteaga    Caller is requesting assistance/information from provider's office.    Symptoms (please be specific): anxiety   How long has patient had these symptoms:    List of preferred pharmacies on file (remove unneeded): [unfilled]  If different, enter pharmacy into here including location and phone number:       Preferred Method of Contact: Phone Call  Patient's Preferred Phone Number on File: [phone]   Best Call Back Number, if different:  Additional Information: Pt is requesting a cb regarding her anxiety

## 2025-01-16 ENCOUNTER — TELEPHONE (OUTPATIENT)
Dept: FAMILY MEDICINE | Facility: CLINIC | Age: 60
End: 2025-01-16
Payer: MEDICARE

## 2025-01-16 NOTE — TELEPHONE ENCOUNTER
----- Message from Lela sent at 1/16/2025  1:25 PM CST -----  Who Called: Heather Arteaga    Caller is requesting assistance/information from provider's office.    Symptoms (please be specific): concerning diabetic shoes    How long has patient had these symptoms:      List of preferred pharmacies on file (remove unneeded): [unfilled]  If different, enter pharmacy into here including location and phone number:     Preferred Method of Contact: Phone Call    Patient's Preferred Phone Number on File: [phone]     Best Call Back Number, if different:    Additional Information: [facility] needs rx for diabetic shoes. Phone # [phone]

## 2025-01-24 ENCOUNTER — TELEPHONE (OUTPATIENT)
Dept: FAMILY MEDICINE | Facility: CLINIC | Age: 60
End: 2025-01-24
Payer: MEDICARE

## 2025-01-24 DIAGNOSIS — E11.9 TYPE 2 DIABETES MELLITUS WITHOUT COMPLICATION, UNSPECIFIED WHETHER LONG TERM INSULIN USE: Primary | ICD-10-CM

## 2025-01-27 ENCOUNTER — OFFICE VISIT (OUTPATIENT)
Dept: FAMILY MEDICINE | Facility: CLINIC | Age: 60
End: 2025-01-27
Payer: MEDICARE

## 2025-01-27 ENCOUNTER — LAB VISIT (OUTPATIENT)
Dept: LAB | Facility: HOSPITAL | Age: 60
End: 2025-01-27
Attending: NURSE PRACTITIONER
Payer: MEDICARE

## 2025-01-27 VITALS
HEIGHT: 65 IN | OXYGEN SATURATION: 99 % | SYSTOLIC BLOOD PRESSURE: 132 MMHG | TEMPERATURE: 98 F | DIASTOLIC BLOOD PRESSURE: 76 MMHG | BODY MASS INDEX: 37.07 KG/M2 | WEIGHT: 222.5 LBS | HEART RATE: 61 BPM

## 2025-01-27 DIAGNOSIS — I10 ESSENTIAL HYPERTENSION: ICD-10-CM

## 2025-01-27 DIAGNOSIS — E11.9 TYPE 2 DIABETES MELLITUS WITHOUT COMPLICATION, WITHOUT LONG-TERM CURRENT USE OF INSULIN: Primary | ICD-10-CM

## 2025-01-27 DIAGNOSIS — E66.9 OBESITY, UNSPECIFIED CLASS, UNSPECIFIED OBESITY TYPE, UNSPECIFIED WHETHER SERIOUS COMORBIDITY PRESENT: ICD-10-CM

## 2025-01-27 DIAGNOSIS — E11.9 TYPE 2 DIABETES MELLITUS WITHOUT COMPLICATION, UNSPECIFIED WHETHER LONG TERM INSULIN USE: ICD-10-CM

## 2025-01-27 LAB
EST. AVERAGE GLUCOSE BLD GHB EST-MCNC: 148.5 MG/DL
HBA1C MFR BLD: 6.8 %

## 2025-01-27 PROCEDURE — 3044F HG A1C LEVEL LT 7.0%: CPT | Mod: ,,, | Performed by: NURSE PRACTITIONER

## 2025-01-27 PROCEDURE — 1159F MED LIST DOCD IN RCRD: CPT | Mod: ,,, | Performed by: NURSE PRACTITIONER

## 2025-01-27 PROCEDURE — 36415 COLL VENOUS BLD VENIPUNCTURE: CPT

## 2025-01-27 PROCEDURE — 99214 OFFICE O/P EST MOD 30 MIN: CPT | Mod: ,,, | Performed by: NURSE PRACTITIONER

## 2025-01-27 PROCEDURE — 3078F DIAST BP <80 MM HG: CPT | Mod: ,,, | Performed by: NURSE PRACTITIONER

## 2025-01-27 PROCEDURE — 1160F RVW MEDS BY RX/DR IN RCRD: CPT | Mod: ,,, | Performed by: NURSE PRACTITIONER

## 2025-01-27 PROCEDURE — 83036 HEMOGLOBIN GLYCOSYLATED A1C: CPT

## 2025-01-27 PROCEDURE — 3008F BODY MASS INDEX DOCD: CPT | Mod: ,,, | Performed by: NURSE PRACTITIONER

## 2025-01-27 PROCEDURE — 3075F SYST BP GE 130 - 139MM HG: CPT | Mod: ,,, | Performed by: NURSE PRACTITIONER

## 2025-01-27 PROCEDURE — G2211 COMPLEX E/M VISIT ADD ON: HCPCS | Mod: ,,, | Performed by: NURSE PRACTITIONER

## 2025-01-28 NOTE — PROGRESS NOTES
Patient ID: [id number]     Chief Complaint: Follow-up (3 month dm labs not done , [facility] paperwork (rx) for shoes, they said they sent to office . Dm log in office)      HPI:     Heather Arteaga is a 59 y.o. female here today for a follow up. No other complaints today.     Health Maintenance   Topic Date Due    Hepatitis C Screening  Never done    Foot Exam  Never done    HIV Screening  Never done    Low Dose Statin  Never done    Pneumococcal Vaccines (Age 50+) (2 of 2 - PCV) 11/23/2022    Cervical Cancer Screening  10/13/2023    COVID-19 Vaccine (8 - 2024-25 season) 09/01/2024    Hemoglobin A1c  07/27/2025    Diabetes Urine Screening  08/20/2025    Lipid Panel  08/20/2025    Diabetic Eye Exam  11/06/2025    Mammogram  12/19/2025    Colorectal Cancer Screening  04/11/2027    TETANUS VACCINE  04/16/2032    RSV Vaccine (Age 60+ and Pregnant patients) (1 - 1-dose 75+ series) 11/16/2041    Shingles Vaccine  Completed    Influenza Vaccine  Completed       Past Medical History:  has a past medical history of Acute pulmonary embolus, Carpal tunnel syndrome, De Quervain's tenosynovitis, Deep vein thrombosis, Diabetes mellitus, type 2, Diabetic neuropathy, Fibroid uterus, Hyperlipidemia, Hypertension, Lumbar back pain with radiculopathy affecting lower extremity, Lumbar spondylitis, Personal history of colonic polyps, and Vitamin D deficiency.    Surgical History:  has a past surgical history that includes Left Femur Sx; Collarbone SX; Repair of meniscus of knee; Neck surgery; Back surgery; Fracture surgery (Right, 05/1/2009); Spine surgery (5/13/2022); Cholecystectomy (2002); Colonoscopy (04/11/2022); ORIF hip fracture (Right); Tonsillectomy; gastric sleeve; and Carpal tunnel release (Left, 4/25/2024).    Family History: family history includes COPD in her mother; Coronary artery disease in her father and mother; Diabetes in her father; Heart disease in her sister.    Social History:  reports that she has quit  "smoking. Her smoking use included cigarettes. She has never used smokeless tobacco. She reports that she does not currently use alcohol. She reports current drug use. Drug: Hydrocodone.    Current Outpatient Medications   Medication Instructions    aspirin 81 mg, Daily    blood sugar diagnostic Strp To check BG 1 times daily, to use with insurance preferred meter    blood-glucose meter (ONETOUCH ULTRA2 METER) Misc USE TO CHECK BLOOD GLUCOSE ONCE DAILY    busPIRone (BUSPAR) 10 mg, Oral, 2 times daily    calcium citrate-vitamin D3 315-200 mg (CITRACAL+D) 315 mg-5 mcg (200 unit) per tablet 1 tablet, Daily    chlorthalidone (HYGROTEN) 25 mg, Oral, Daily    ezetimibe (ZETIA) 10 mg, Oral, Every morning    gabapentin (NEURONTIN) 600 MG tablet 1 tablet, As needed (PRN)    glimepiride (AMARYL) 1 mg, Oral, With breakfast    HYDROcodone-acetaminophen (NORCO)  mg per tablet 1 tablet, Every 4 hours PRN    lancets Misc To check BG 1 times daily, to use with insurance preferred meter    levothyroxine (SYNTHROID) 50 mcg, Oral, Before breakfast    lisinopriL (PRINIVIL,ZESTRIL) 2.5 mg, Oral, Daily    MOVANTIK 25 mg, As needed (PRN)    REPATHA PUSHTRONEX 420 mg/3.5 mL Injt inject 3.5 mls sub-q once per month    tirzepatide 7.5 mg, Subcutaneous, Every 7 days       Patient is allergic to atorvastatin, rosuvastatin, coenzyme q10, pioglitazone, buprenorphine, and metformin.     Patient Care Team:  Kriss Kolb FNP as PCP - General (Family Medicine)  Kandis Victor DPM as Consulting Physician (Podiatry)  José Miguel, Corrie SHELBY MD (Obstetrics and Gynecology)  Katerine Valdivia MD (Family Medicine)       Subjective:     Review of Systems    12 point review of systems conducted, negative except as stated in the history of present illness. See HPI for details.      Objective:     Visit Vitals  /76   Pulse 61   Temp 97.9 °F (36.6 °C)   Ht 5' 5" (1.651 m)   Wt 100.9 kg (222 lb 8 oz)   SpO2 99%   BMI 37.03 kg/m² "       Physical Exam    General: Alert and oriented, No acute distress.  Head: Normocephalic, Atraumatic.  Eye: Pupils are equal, round and reactive to light, Extraocular movements are intact, Sclera non-icteric.  Ears/Nose/Throat: Normal, Mucosa moist,Clear.  Neck/Thyroid: Supple, Non-tender, No carotid bruit, No lymphadenopathy, No JVD, Full range of motion.  Respiratory: Clear to auscultation bilaterally; No wheezes, rales or rhonchi,Non-labored respirations, Symmetrical chest wall expansion.  Cardiovascular: Regular rate and rhythm, S1/S2 normal, No murmurs, rubs or gallops.  Gastrointestinal: Soft, Non-tender, Non-distended, Normal bowel sounds, No palpable organomegaly.  Musculoskeletal: Normal range of motion.  Integumentary: Warm, Dry, Intact, No suspicious lesions or rashes.  Extremities: No clubbing, cyanosis or edema  Neurologic: No focal deficits, Cranial Nerves II-XII are grossly intact, Motor strength normal upper and lower extremities, Sensory exam intact.  Psychiatric: Normal interaction, Coherent speech, Euthymic mood, Appropriate affect     Labs Reviewed:     Chemistry:  Lab Results   Component Value Date     08/20/2024    K 3.7 08/20/2024    BUN 16.3 08/20/2024    CREATININE 0.77 08/20/2024    EGFRNORACEVR >60 08/20/2024    GLUCOSE 119 (H) 08/20/2024    CALCIUM 9.8 08/20/2024    ALKPHOS 81 08/20/2024    LABPROT 7.0 08/20/2024    ALBUMIN 3.9 08/20/2024    BILIDIR 0.2 04/07/2022    IBILI 0.20 04/07/2022    AST 26 08/20/2024    ALT 26 08/20/2024    MG 1.8 03/15/2018    DNFMWCJZ75HT 32 08/20/2024    TSH 3.015 08/20/2024    [id number] 0.91 08/20/2024        Lab Results   Component Value Date    HGBA1C 6.8 01/27/2025        Hematology:  Lab Results   Component Value Date    WBC 6.37 08/20/2024    HGB 13.4 08/20/2024    HCT 42.9 08/20/2024     08/20/2024       Lipid Panel:  Lab Results   Component Value Date    CHOL 200 08/20/2024    HDL 66 (H) 08/20/2024    .00 08/20/2024    TRIG 56  08/20/2024    TOTALCHOLEST 3 08/20/2024        Urine:  Lab Results   Component Value Date    APPEARANCEUA Slightly Cloudy (A) 12/24/2024    SGUA >=1.030 12/24/2024    PROTEINUA Trace (A) 12/24/2024    KETONESUA Negative 12/24/2024    LEUKOCYTESUR Small (A) 12/24/2024    RBCUA 6-10 (A) 12/24/2024    WBCUA 50-99 (A) 12/24/2024    BACTERIA Many (A) 12/24/2024    SQEPUA Few (A) 10/19/2023    HYALINECASTS 0-2 (A) 10/19/2023    CREATRANDUR 151.4 (H) 08/20/2024          Assessment/Plan     Assessment & Plan  Type 2 diabetes mellitus without complication, without long-term current use of insulin  Lab Results   Component Value Date    HGBA1C 6.8 01/27/2025    HGBA1C 6.6 08/20/2024    .00 08/20/2024    CREATININE 0.77 08/20/2024      Continue   Diabetes Medications               glimepiride (AMARYL) 1 MG tablet Take 1 tablet (1 mg total) by mouth daily with breakfast.    tirzepatide 7.5 mg/0.5 mL PnIj Inject 7.5 mg into the skin every 7 days.        Follow ADA Diet. Avoid soda, simple sweets, and limit rice/pasta/breads/starches (no more than 45-50 grams per meal).  Maintain healthy weight with goal BMI <30.  Exercise 5 times per week for 30 minutes per day.  Stressed importance of daily foot exams.  Stressed importance of annual dilated eye exam.    Orders:    tirzepatide 7.5 mg/0.5 mL PnIj; Inject 7.5 mg into the skin every 7 days.    Essential hypertension  Well controlled.   Continue  Hypertension Medications               chlorthalidone (HYGROTEN) 25 MG Tab Take 1 tablet (25 mg total) by mouth once daily.    lisinopriL (PRINIVIL,ZESTRIL) 2.5 MG tablet Take 1 tablet (2.5 mg total) by mouth once daily.        Low Sodium Diet (DASH Diet - Less than 2 grams of sodium per day).  Monitor blood pressure daily and log. Report consistent numbers greater than 140/90.  Maintain healthy weight with goal BMI <30. Exercise 30 minutes per day, 5 days per week.  Smoking cessation encouraged to aid in BP reduction.    Obesity,  unspecified class, unspecified obesity type, unspecified whether serious comorbidity present  Body mass index is 37.03 kg/m².  Goal BMI <30.  Exercise 5 times a week for 30 minutes per day.  Avoid soda, simple sugars, excessive rice, potatoes or bread. Limit fast foods and fried foods.  Choose complex carbs in moderation (example: green vegetables, beans, oatmeal). Eat plenty of fresh fruits and vegetables with lean meats daily.  Do not skip meals. Eat a balanced portion size.  Avoid fad diets. Consider permanent healthy life style changes.          Follow up in about 6 months (around 7/27/2025) for DM. In addition to their scheduled follow up, the patient has also been instructed to follow up on as needed basis.     Future Appointments   Date Time Provider Department Center   7/28/2025  8:15 AM Kriss Kolb FNP [facility]   8/27/2025  1:30 PM Kriss Kolb FNP [facility]        KHAI Zambrano

## 2025-01-28 NOTE — ASSESSMENT & PLAN NOTE
Lab Results   Component Value Date    HGBA1C 6.8 01/27/2025    HGBA1C 6.6 08/20/2024    .00 08/20/2024    CREATININE 0.77 08/20/2024      Continue   Diabetes Medications               glimepiride (AMARYL) 1 MG tablet Take 1 tablet (1 mg total) by mouth daily with breakfast.    tirzepatide 7.5 mg/0.5 mL PnIj Inject 7.5 mg into the skin every 7 days.        Follow ADA Diet. Avoid soda, simple sweets, and limit rice/pasta/breads/starches (no more than 45-50 grams per meal).  Maintain healthy weight with goal BMI <30.  Exercise 5 times per week for 30 minutes per day.  Stressed importance of daily foot exams.  Stressed importance of annual dilated eye exam.    Orders:    tirzepatide 7.5 mg/0.5 mL PnIj; Inject 7.5 mg into the skin every 7 days.

## 2025-01-28 NOTE — ASSESSMENT & PLAN NOTE
Well controlled.   Continue  Hypertension Medications               chlorthalidone (HYGROTEN) 25 MG Tab Take 1 tablet (25 mg total) by mouth once daily.    lisinopriL (PRINIVIL,ZESTRIL) 2.5 MG tablet Take 1 tablet (2.5 mg total) by mouth once daily.        Low Sodium Diet (DASH Diet - Less than 2 grams of sodium per day).  Monitor blood pressure daily and log. Report consistent numbers greater than 140/90.  Maintain healthy weight with goal BMI <30. Exercise 30 minutes per day, 5 days per week.  Smoking cessation encouraged to aid in BP reduction.

## 2025-01-31 DIAGNOSIS — E11.65 TYPE 2 DIABETES MELLITUS WITH HYPERGLYCEMIA, UNSPECIFIED WHETHER LONG TERM INSULIN USE: ICD-10-CM

## 2025-01-31 RX ORDER — GLIMEPIRIDE 1 MG/1
1 TABLET ORAL
Qty: 30 TABLET | Refills: 11 | Status: SHIPPED | OUTPATIENT
Start: 2025-01-31

## 2025-02-10 ENCOUNTER — LAB VISIT (OUTPATIENT)
Dept: LAB | Facility: HOSPITAL | Age: 60
End: 2025-02-10
Attending: SURGERY
Payer: MEDICARE

## 2025-02-10 DIAGNOSIS — Z12.11 SPECIAL SCREENING FOR MALIGNANT NEOPLASMS, COLON: Primary | ICD-10-CM

## 2025-02-10 LAB
HEMOCCULT SP1 STL QL: NEGATIVE
HEMOCCULT SP2 STL QL: NEGATIVE
HEMOCCULT SP3 STL QL: NEGATIVE

## 2025-02-10 PROCEDURE — 82270 OCCULT BLOOD FECES: CPT

## 2025-05-08 ENCOUNTER — HOSPITAL ENCOUNTER (EMERGENCY)
Facility: HOSPITAL | Age: 60
Discharge: HOME | End: 2025-05-08
Attending: EMERGENCY MEDICINE
Payer: MEDICARE

## 2025-05-08 VITALS
RESPIRATION RATE: 20 BRPM | TEMPERATURE: 98 F | HEART RATE: 78 BPM | HEIGHT: 65 IN | WEIGHT: 220 LBS | DIASTOLIC BLOOD PRESSURE: 76 MMHG | SYSTOLIC BLOOD PRESSURE: 182 MMHG | BODY MASS INDEX: 36.65 KG/M2 | OXYGEN SATURATION: 98 %
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
## 2025-05-08 PROCEDURE — 99283 EMERGENCY DEPT VISIT LOW MDM: CPT | Mod: 25

## 2025-05-08 PROCEDURE — 25000003 PHARM REV CODE 250: Performed by: NURSE PRACTITIONER

## 2025-05-08 RX ORDER — METHOCARBAMOL 500 MG/1
1000 TABLET, FILM COATED ORAL
Status: COMPLETED | OUTPATIENT
Start: 2025-05-08 | End: 2025-05-08

## 2025-05-08 RX ORDER — IBUPROFEN 600 MG/1
600 TABLET, FILM COATED ORAL
Status: COMPLETED | OUTPATIENT
Start: 2025-05-08 | End: 2025-05-08

## 2025-05-08 RX ORDER — IBUPROFEN 600 MG/1
600 TABLET, FILM COATED ORAL EVERY 6 HOURS PRN
Qty: 20 TABLET | Refills: 0 | Status: SHIPPED | OUTPATIENT
Start: 2025-05-08

## 2025-05-08 RX ORDER — CYCLOBENZAPRINE HCL 10 MG
10 TABLET ORAL 3 TIMES DAILY PRN
Qty: 15 TABLET | Refills: 0 | Status: SHIPPED | OUTPATIENT
Start: 2025-05-08 | End: 2025-05-13

## 2025-05-08 RX ADMIN — METHOCARBAMOL 1000 MG: 500 TABLET ORAL at 01:05

## 2025-05-08 RX ADMIN — IBUPROFEN 600 MG: 600 TABLET, FILM COATED ORAL at 01:05

## 2025-05-08 NOTE — ED PROVIDER NOTES
Encounter Date: 5/8/2025       History     Chief Complaint   Patient presents with    Fall     Pt reports she slipped while getting out bath tub and hit back of head, now having neck pain. Denies thinner or LOC.      58 yo female with a h/o HTN, HLD, DM presents with a c/o neck and head pain following a slip and fall in her bathtub today, just PTA.  No provocative or palliative factors reported.  Pt denies hitting her head, LOC, blurry vision, dizziness, n/v.  She denies taking blood thinners at this time.  No medications taken prior to arrival to help alleviate her symptoms.    The history is provided by the patient. No  was used.     Review of patient's allergies indicates:   Allergen Reactions    Atorvastatin Swelling     FEET SWELLING    Rosuvastatin Swelling     FEET SWELLING    Coenzyme q10     Pioglitazone     Buprenorphine Rash     Other reaction(s): burn    Metformin Diarrhea     Past Medical History:   Diagnosis Date    Acute pulmonary embolus     Carpal tunnel syndrome     De Quervain's tenosynovitis     Deep vein thrombosis     Diabetes mellitus, type 2     Diabetic neuropathy     Fibroid uterus     Hyperlipidemia     Hypertension     Lumbar back pain with radiculopathy affecting lower extremity     Lumbar spondylitis     Personal history of colonic polyps     Vitamin D deficiency      Past Surgical History:   Procedure Laterality Date    BACK SURGERY      CARPAL TUNNEL RELEASE Left 4/25/2024    Procedure: RELEASE, CARPAL TUNNEL;  Surgeon: Damien Rangel MD;  Location: [facility];  Service: Orthopedics;  Laterality: Left;    CHOLECYSTECTOMY  2002    Collarbone SX      COLONOSCOPY  04/11/2022    FRACTURE SURGERY Right 05/1/2009    Total Hip Replacenwnt    gastric sleeve      Left Femur Sx      NECK SURGERY      ORIF HIP FRACTURE Right     REPAIR OF MENISCUS OF KNEE      SPINE SURGERY  5/13/2022    Back Surgery    TONSILLECTOMY       Family History   Problem Relation Name Age of Onset     Coronary artery disease Father Jailyn Arteaga     Diabetes Father Jailyn Arteaga     Coronary artery disease Mother Angela     COPD Mother Angela             Heart disease Sister Leslie Mena      Social History[1]  Review of Systems   Constitutional: Negative.  Negative for activity change, appetite change, chills and fever.   HENT:  Negative for congestion and sore throat.    Eyes: Negative.    Respiratory:  Negative for shortness of breath.    Cardiovascular: Negative.  Negative for chest pain.   Gastrointestinal: Negative.  Negative for abdominal pain, nausea and vomiting.   Endocrine: Negative.    Genitourinary: Negative.  Negative for dysuria.   Musculoskeletal:  Positive for neck pain. Negative for back pain and neck stiffness.   Skin: Negative.  Negative for rash.   Allergic/Immunologic: Negative.    Neurological:  Positive for headaches. Negative for dizziness, tremors, syncope, facial asymmetry, weakness, light-headedness and numbness.   Hematological: Negative.  Does not bruise/bleed easily.   Psychiatric/Behavioral: Negative.     All other systems reviewed and are negative.      Physical Exam     Initial Vitals [25 1259]   BP Pulse Resp Temp SpO2   (!) 193/74 78 20 97.6 °F (36.4 °C) 98 %      MAP       --         Physical Exam    Nursing note and vitals reviewed.  Constitutional: She appears well-developed and well-nourished.   HENT:   Head: Normocephalic and atraumatic.   Eyes: Conjunctivae and EOM are normal. Pupils are equal, round, and reactive to light.   Neck: Neck supple.   Normal range of motion.  Cardiovascular:  Normal rate, regular rhythm, normal heart sounds and intact distal pulses.           Pulmonary/Chest: Breath sounds normal.   Abdominal: Abdomen is soft. Bowel sounds are normal.   Musculoskeletal:         General: Normal range of motion.      Cervical back: Normal range of motion and neck supple.     Neurological: She is alert and oriented to person, place, and time. She  has normal strength.   Skin: Skin is warm and dry. Capillary refill takes less than 2 seconds.   Psychiatric: She has a normal mood and affect. Her behavior is normal.         ED Course   Procedures  Labs Reviewed - No data to display       Imaging Results              X-Ray Cervical Spine AP And Lateral (Final result)  Result time 05/08/25 13:36:35      Final result by Gilma Arizmendi MD (05/08/25 13:36:35)                   Impression:      No acute abnormality identified      Electronically signed by: Gilma Arizmendi  Date:    05/08/2025  Time:    13:36               Narrative:    EXAMINATION:  XR CERVICAL SPINE AP LATERAL    CLINICAL HISTORY:  Unspecified fall, initial encounter    COMPARISON:  X-rays dated 04/13/2018    FINDINGS:  There are postoperative changes with anterior fusion hardware at C4-C7.  Cervical alignment is normal.  The vertebral body heights and disc spaces are otherwise maintained.  There small marginal osteophytes at C3-C4.  There is scattered vascular calcifications.                                       Medications   methocarbamoL tablet 1,000 mg (1,000 mg Oral Given 5/8/25 1306)   ibuprofen tablet 600 mg (600 mg Oral Given 5/8/25 1306)     Medical Decision Making  Differential diagnoses:  cervical strain, cervical fracture, head injury, tension HA, concussion  58 yo female with a h/o HTN, HLD, DM presents with a c/o neck and head pain following a slip and fall in her bathtub today, just PTA.  No provocative or palliative factors reported.  Pt denies hitting her head, LOC, blurry vision, dizziness, n/v.  She denies taking blood thinners at this time.  No medications taken prior to arrival to help alleviate her symptoms.  Physical exam as documented.  Patient is well-appearing.  She has no C-spine point tenderness or step-offs.  She does have some tenderness over the right paraspinal muscles of the cervical spine.  X-ray of the cervical spine shows hardware and degenerative changes  however, no acute findings.  Patient will be discharged home with prescriptions and instructions for symptomatic treatment.  She remains neurologically intact.  She may follow up with her PCP if symptoms persist.  I have given her strict return precautions.  Patient verbalized understanding of the plan and agrees.    Amount and/or Complexity of Data Reviewed  Radiology: ordered.    Risk  Prescription drug management.                                      Clinical Impression:  Final diagnoses:  [W19.XXXA] Fall  [W19.XXXA] Fall, initial encounter (Primary)          ED Disposition Condition    Discharge Stable          ED Prescriptions       Medication Sig Dispense Start Date End Date Auth. Provider    cyclobenzaprine (FLEXERIL) 10 MG tablet Take 1 tablet (10 mg total) by mouth 3 (three) times daily as needed for Muscle spasms. 15 tablet 5/8/2025 5/13/2025 Florina Clemente NP    ibuprofen (ADVIL,MOTRIN) 600 MG tablet Take 1 tablet (600 mg total) by mouth every 6 (six) hours as needed for Pain. 20 tablet 5/8/2025 -- Florina Clemente NP          Follow-up Information       Follow up With Specialties Details Why Contact Info    Kriss Kolb, FNP Family Medicine In 1 week For ED follow-up, As needed [address] [facility] [id number]  [phone]                 [1]   Social History  Tobacco Use    Smoking status: Former     Types: Cigarettes    Smokeless tobacco: Never   Substance Use Topics    Alcohol use: Not Currently    Drug use: Yes     Types: Hydrocodone     Comment: pain mgmt; Florina Ireland NP  05/08/25 [id number]

## 2025-06-17 DIAGNOSIS — F41.9 ANXIETY: ICD-10-CM

## 2025-06-17 RX ORDER — BUSPIRONE HYDROCHLORIDE 10 MG/1
10 TABLET ORAL 2 TIMES DAILY
Qty: 60 TABLET | Refills: 0 | Status: SHIPPED | OUTPATIENT
Start: 2025-06-17

## 2025-07-13 DIAGNOSIS — F41.9 ANXIETY: ICD-10-CM

## 2025-07-14 RX ORDER — BUSPIRONE HYDROCHLORIDE 10 MG/1
10 TABLET ORAL 2 TIMES DAILY
Qty: 60 TABLET | Refills: 3 | Status: SHIPPED | OUTPATIENT
Start: 2025-07-14

## 2025-08-03 DIAGNOSIS — E11.65 TYPE 2 DIABETES MELLITUS WITH HYPERGLYCEMIA, UNSPECIFIED WHETHER LONG TERM INSULIN USE: ICD-10-CM

## 2025-08-04 ENCOUNTER — TELEPHONE (OUTPATIENT)
Dept: FAMILY MEDICINE | Facility: CLINIC | Age: 60
End: 2025-08-04
Payer: MEDICARE

## 2025-08-04 DIAGNOSIS — E11.65 TYPE 2 DIABETES MELLITUS WITH HYPERGLYCEMIA, UNSPECIFIED WHETHER LONG TERM INSULIN USE: Primary | ICD-10-CM

## 2025-08-11 ENCOUNTER — TELEPHONE (OUTPATIENT)
Dept: FAMILY MEDICINE | Facility: CLINIC | Age: 60
End: 2025-08-11
Payer: MEDICARE

## 2025-08-11 DIAGNOSIS — Z12.4 ENCOUNTER FOR SCREENING FOR CERVICAL CANCER: ICD-10-CM

## 2025-08-11 DIAGNOSIS — Z00.00 WELL ADULT EXAM: ICD-10-CM

## 2025-08-11 DIAGNOSIS — E66.01 CLASS 2 SEVERE OBESITY WITH SERIOUS COMORBIDITY AND BODY MASS INDEX (BMI) OF 35.0 TO 35.9 IN ADULT, UNSPECIFIED OBESITY TYPE: ICD-10-CM

## 2025-08-11 DIAGNOSIS — N30.00 ACUTE CYSTITIS WITHOUT HEMATURIA: ICD-10-CM

## 2025-08-11 DIAGNOSIS — E66.9 OBESITY, UNSPECIFIED CLASS, UNSPECIFIED OBESITY TYPE, UNSPECIFIED WHETHER SERIOUS COMORBIDITY PRESENT: ICD-10-CM

## 2025-08-11 DIAGNOSIS — E78.2 MIXED HYPERLIPIDEMIA: ICD-10-CM

## 2025-08-11 DIAGNOSIS — E66.812 CLASS 2 SEVERE OBESITY WITH SERIOUS COMORBIDITY AND BODY MASS INDEX (BMI) OF 35.0 TO 35.9 IN ADULT, UNSPECIFIED OBESITY TYPE: ICD-10-CM

## 2025-08-11 DIAGNOSIS — E11.9 TYPE 2 DIABETES MELLITUS WITHOUT COMPLICATION, UNSPECIFIED WHETHER LONG TERM INSULIN USE: ICD-10-CM

## 2025-08-11 DIAGNOSIS — F41.9 ANXIETY: ICD-10-CM

## 2025-08-11 DIAGNOSIS — E66.811 CLASS 1 OBESITY WITH BODY MASS INDEX (BMI) OF 34.0 TO 34.9 IN ADULT, UNSPECIFIED OBESITY TYPE, UNSPECIFIED WHETHER SERIOUS COMORBIDITY PRESENT: ICD-10-CM

## 2025-08-11 DIAGNOSIS — R30.0 DYSURIA: ICD-10-CM

## 2025-08-11 DIAGNOSIS — E11.43 DIABETIC AUTONOMIC NEUROPATHY ASSOCIATED WITH TYPE 2 DIABETES MELLITUS: ICD-10-CM

## 2025-08-11 DIAGNOSIS — E11.65 TYPE 2 DIABETES MELLITUS WITH HYPERGLYCEMIA, UNSPECIFIED WHETHER LONG TERM INSULIN USE: Primary | ICD-10-CM

## 2025-08-14 ENCOUNTER — HOSPITAL ENCOUNTER (OUTPATIENT)
Dept: RADIOLOGY | Facility: HOSPITAL | Age: 60
Discharge: HOME | End: 2025-08-14
Attending: SURGERY
Payer: MEDICARE

## 2025-08-14 ENCOUNTER — CLINICAL SUPPORT (OUTPATIENT)
Dept: RESPIRATORY THERAPY | Facility: HOSPITAL | Age: 60
End: 2025-08-14
Attending: SURGERY
Payer: MEDICARE

## 2025-08-14 DIAGNOSIS — L98.7 EXCESS SKIN OF THIGH: Primary | ICD-10-CM

## 2025-08-14 DIAGNOSIS — Z01.811 PRE-OP CHEST EXAM: ICD-10-CM

## 2025-08-14 DIAGNOSIS — L98.7 EXCESS SKIN OF THIGH: ICD-10-CM

## 2025-08-14 LAB
OHS QRS DURATION: 86 MS
OHS QTC CALCULATION: 416 MS

## 2025-08-14 PROCEDURE — 71046 X-RAY EXAM CHEST 2 VIEWS: CPT | Mod: TC

## 2025-08-14 PROCEDURE — 93005 ELECTROCARDIOGRAM TRACING: CPT

## 2025-08-14 PROCEDURE — 93010 ELECTROCARDIOGRAM REPORT: CPT | Mod: ,,, | Performed by: INTERNAL MEDICINE

## 2025-08-14 RX ORDER — TIZANIDINE 4 MG/1
4 TABLET ORAL NIGHTLY
COMMUNITY
Start: 2025-06-16

## 2025-08-14 RX ORDER — ESTRADIOL 0.1 MG/G
CREAM VAGINAL
COMMUNITY
Start: 2025-05-12

## 2025-08-14 RX ORDER — CELECOXIB 100 MG/1
CAPSULE ORAL
COMMUNITY
Start: 2025-06-16

## 2025-08-20 ENCOUNTER — ANESTHESIA EVENT (OUTPATIENT)
Dept: SURGERY | Facility: HOSPITAL | Age: 60
End: 2025-08-20
Payer: MEDICARE

## 2025-08-21 ENCOUNTER — HOSPITAL ENCOUNTER (OUTPATIENT)
Facility: HOSPITAL | Age: 60
Discharge: HOME | End: 2025-08-23
Attending: SURGERY | Admitting: SURGERY
Payer: MEDICARE

## 2025-08-21 ENCOUNTER — ANESTHESIA (OUTPATIENT)
Dept: SURGERY | Facility: HOSPITAL | Age: 60
End: 2025-08-21
Payer: MEDICARE

## 2025-08-21 DIAGNOSIS — E65 ABDOMINAL PANNUS: ICD-10-CM

## 2025-08-21 DIAGNOSIS — L30.4 ERYTHEMA INTERTRIGO: ICD-10-CM

## 2025-08-21 DIAGNOSIS — L98.7 EXCESS SKIN OF ABDOMEN: ICD-10-CM

## 2025-08-21 DIAGNOSIS — L98.7 EXCESS SKIN: ICD-10-CM

## 2025-08-21 LAB
POCT GLUCOSE: 145 MG/DL (ref 70–110)
POCT GLUCOSE: 191 MG/DL (ref 70–110)

## 2025-08-21 PROCEDURE — 63600175 PHARM REV CODE 636 W HCPCS: Performed by: NURSE ANESTHETIST, CERTIFIED REGISTERED

## 2025-08-21 PROCEDURE — 25000003 PHARM REV CODE 250: Performed by: ANESTHESIOLOGY

## 2025-08-21 PROCEDURE — 63600175 PHARM REV CODE 636 W HCPCS: Performed by: ANESTHESIOLOGY

## 2025-08-21 PROCEDURE — 36000706: Performed by: SURGERY

## 2025-08-21 PROCEDURE — 96372 THER/PROPH/DIAG INJ SC/IM: CPT | Performed by: SURGERY

## 2025-08-21 PROCEDURE — G0378 HOSPITAL OBSERVATION PER HR: HCPCS

## 2025-08-21 PROCEDURE — 94799 UNLISTED PULMONARY SVC/PX: CPT

## 2025-08-21 PROCEDURE — 25000003 PHARM REV CODE 250: Performed by: NURSE ANESTHETIST, CERTIFIED REGISTERED

## 2025-08-21 PROCEDURE — 99900031 HC PATIENT EDUCATION (STAT)

## 2025-08-21 PROCEDURE — 88305 TISSUE EXAM BY PATHOLOGIST: CPT | Performed by: SURGERY

## 2025-08-21 PROCEDURE — 71000033 HC RECOVERY, INTIAL HOUR: Performed by: SURGERY

## 2025-08-21 PROCEDURE — 94761 N-INVAS EAR/PLS OXIMETRY MLT: CPT

## 2025-08-21 PROCEDURE — 71000039 HC RECOVERY, EACH ADD'L HOUR: Performed by: SURGERY

## 2025-08-21 PROCEDURE — 37000008 HC ANESTHESIA 1ST 15 MINUTES: Performed by: SURGERY

## 2025-08-21 PROCEDURE — 36000707: Performed by: SURGERY

## 2025-08-21 PROCEDURE — 25000003 PHARM REV CODE 250: Performed by: SURGERY

## 2025-08-21 PROCEDURE — 63600175 PHARM REV CODE 636 W HCPCS: Performed by: SURGERY

## 2025-08-21 PROCEDURE — 27201423 OPTIME MED/SURG SUP & DEVICES STERILE SUPPLY: Performed by: SURGERY

## 2025-08-21 PROCEDURE — C1729 CATH, DRAINAGE: HCPCS | Performed by: SURGERY

## 2025-08-21 PROCEDURE — 37000009 HC ANESTHESIA EA ADD 15 MINS: Performed by: SURGERY

## 2025-08-21 RX ORDER — GLUCAGON 1 MG
1 KIT INJECTION
Status: DISCONTINUED | OUTPATIENT
Start: 2025-08-21 | End: 2025-08-21

## 2025-08-21 RX ORDER — BUPIVACAINE HYDROCHLORIDE 2.5 MG/ML
INJECTION, SOLUTION EPIDURAL; INFILTRATION; INTRACAUDAL; PERINEURAL
Status: DISCONTINUED | OUTPATIENT
Start: 2025-08-21 | End: 2025-08-21 | Stop reason: HOSPADM

## 2025-08-21 RX ORDER — ONDANSETRON HYDROCHLORIDE 2 MG/ML
INJECTION, SOLUTION INTRAMUSCULAR; INTRAVENOUS
Status: DISCONTINUED | OUTPATIENT
Start: 2025-08-21 | End: 2025-08-21

## 2025-08-21 RX ORDER — SODIUM CHLORIDE 9 MG/ML
INJECTION, SOLUTION INTRAVENOUS CONTINUOUS
Status: DISCONTINUED | OUTPATIENT
Start: 2025-08-21 | End: 2025-08-22

## 2025-08-21 RX ORDER — HYDROCODONE BITARTRATE AND ACETAMINOPHEN 5; 325 MG/1; MG/1
1 TABLET ORAL EVERY 4 HOURS PRN
Status: DISCONTINUED | OUTPATIENT
Start: 2025-08-21 | End: 2025-08-23 | Stop reason: HOSPADM

## 2025-08-21 RX ORDER — ACETAMINOPHEN 325 MG/1
650 TABLET ORAL EVERY 6 HOURS PRN
Status: DISCONTINUED | OUTPATIENT
Start: 2025-08-21 | End: 2025-08-23 | Stop reason: HOSPADM

## 2025-08-21 RX ORDER — CLINDAMYCIN PHOSPHATE 900 MG/50ML
900 INJECTION, SOLUTION INTRAVENOUS EVERY 8 HOURS
Status: DISCONTINUED | OUTPATIENT
Start: 2025-08-21 | End: 2025-08-21

## 2025-08-21 RX ORDER — HYDROMORPHONE HYDROCHLORIDE 2 MG/ML
0.2 INJECTION, SOLUTION INTRAMUSCULAR; INTRAVENOUS; SUBCUTANEOUS EVERY 5 MIN PRN
Status: DISCONTINUED | OUTPATIENT
Start: 2025-08-21 | End: 2025-08-21

## 2025-08-21 RX ORDER — FENTANYL CITRATE 50 UG/ML
INJECTION, SOLUTION INTRAMUSCULAR; INTRAVENOUS
Status: DISCONTINUED | OUTPATIENT
Start: 2025-08-21 | End: 2025-08-21

## 2025-08-21 RX ORDER — DEXAMETHASONE SODIUM PHOSPHATE 4 MG/ML
INJECTION, SOLUTION INTRA-ARTICULAR; INTRALESIONAL; INTRAMUSCULAR; INTRAVENOUS; SOFT TISSUE
Status: DISCONTINUED | OUTPATIENT
Start: 2025-08-21 | End: 2025-08-21

## 2025-08-21 RX ORDER — HYDROMORPHONE HYDROCHLORIDE 2 MG/ML
0.25 INJECTION, SOLUTION INTRAMUSCULAR; INTRAVENOUS; SUBCUTANEOUS EVERY 4 HOURS PRN
Status: DISCONTINUED | OUTPATIENT
Start: 2025-08-21 | End: 2025-08-23 | Stop reason: HOSPADM

## 2025-08-21 RX ORDER — CEFTRIAXONE 1 G/1
1 INJECTION, POWDER, FOR SOLUTION INTRAMUSCULAR; INTRAVENOUS 2 TIMES DAILY
Status: DISCONTINUED | OUTPATIENT
Start: 2025-08-21 | End: 2025-08-22

## 2025-08-21 RX ORDER — CEFAZOLIN SODIUM 2 G/50ML
2 SOLUTION INTRAVENOUS
Status: COMPLETED | OUTPATIENT
Start: 2025-08-21 | End: 2025-08-21

## 2025-08-21 RX ORDER — CLINDAMYCIN PHOSPHATE 900 MG/50ML
900 INJECTION, SOLUTION INTRAVENOUS
Status: DISCONTINUED | OUTPATIENT
Start: 2025-08-21 | End: 2025-08-21 | Stop reason: HOSPADM

## 2025-08-21 RX ORDER — LEVOTHYROXINE SODIUM 50 UG/1
50 TABLET ORAL
Status: DISCONTINUED | OUTPATIENT
Start: 2025-08-22 | End: 2025-08-23 | Stop reason: HOSPADM

## 2025-08-21 RX ORDER — BUSPIRONE HYDROCHLORIDE 5 MG/1
10 TABLET ORAL 2 TIMES DAILY
Status: DISCONTINUED | OUTPATIENT
Start: 2025-08-22 | End: 2025-08-23 | Stop reason: HOSPADM

## 2025-08-21 RX ORDER — EPHEDRINE SULFATE 50 MG/ML
INJECTION, SOLUTION INTRAVENOUS
Status: DISCONTINUED | OUTPATIENT
Start: 2025-08-21 | End: 2025-08-21

## 2025-08-21 RX ORDER — ACETAMINOPHEN 500 MG
1000 TABLET ORAL
Status: COMPLETED | OUTPATIENT
Start: 2025-08-21 | End: 2025-08-21

## 2025-08-21 RX ORDER — FENTANYL CITRATE 50 UG/ML
25 INJECTION, SOLUTION INTRAMUSCULAR; INTRAVENOUS EVERY 5 MIN PRN
Status: DISCONTINUED | OUTPATIENT
Start: 2025-08-21 | End: 2025-08-21

## 2025-08-21 RX ORDER — TALC
6 POWDER (GRAM) TOPICAL NIGHTLY PRN
Status: DISCONTINUED | OUTPATIENT
Start: 2025-08-21 | End: 2025-08-23 | Stop reason: HOSPADM

## 2025-08-21 RX ORDER — CEFTRIAXONE 1 G/1
1 INJECTION, POWDER, FOR SOLUTION INTRAMUSCULAR; INTRAVENOUS DAILY
Status: DISCONTINUED | OUTPATIENT
Start: 2025-08-21 | End: 2025-08-21

## 2025-08-21 RX ORDER — DEXMEDETOMIDINE HYDROCHLORIDE 100 UG/ML
INJECTION, SOLUTION INTRAVENOUS
Status: DISCONTINUED | OUTPATIENT
Start: 2025-08-21 | End: 2025-08-21

## 2025-08-21 RX ORDER — PROPOFOL 10 MG/ML
VIAL (ML) INTRAVENOUS
Status: DISCONTINUED | OUTPATIENT
Start: 2025-08-21 | End: 2025-08-21

## 2025-08-21 RX ORDER — ACETAMINOPHEN 10 MG/ML
1000 INJECTION, SOLUTION INTRAVENOUS ONCE
Status: COMPLETED | OUTPATIENT
Start: 2025-08-21 | End: 2025-08-21

## 2025-08-21 RX ORDER — ONDANSETRON HYDROCHLORIDE 2 MG/ML
4 INJECTION, SOLUTION INTRAVENOUS EVERY 6 HOURS PRN
Status: DISCONTINUED | OUTPATIENT
Start: 2025-08-21 | End: 2025-08-23 | Stop reason: HOSPADM

## 2025-08-21 RX ORDER — GABAPENTIN 300 MG/1
600 CAPSULE ORAL
Status: COMPLETED | OUTPATIENT
Start: 2025-08-21 | End: 2025-08-21

## 2025-08-21 RX ORDER — ENOXAPARIN SODIUM 100 MG/ML
40 INJECTION SUBCUTANEOUS EVERY 24 HOURS
Status: DISCONTINUED | OUTPATIENT
Start: 2025-08-21 | End: 2025-08-22

## 2025-08-21 RX ORDER — ROCURONIUM BROMIDE 10 MG/ML
INJECTION, SOLUTION INTRAVENOUS
Status: DISCONTINUED | OUTPATIENT
Start: 2025-08-21 | End: 2025-08-21

## 2025-08-21 RX ORDER — ACETAMINOPHEN 325 MG/1
650 TABLET ORAL EVERY 4 HOURS PRN
Status: DISCONTINUED | OUTPATIENT
Start: 2025-08-21 | End: 2025-08-23 | Stop reason: HOSPADM

## 2025-08-21 RX ORDER — MUPIROCIN 20 MG/G
1 OINTMENT TOPICAL 2 TIMES DAILY
Status: DISCONTINUED | OUTPATIENT
Start: 2025-08-21 | End: 2025-08-23 | Stop reason: HOSPADM

## 2025-08-21 RX ORDER — CLINDAMYCIN PHOSPHATE 900 MG/50ML
900 INJECTION, SOLUTION INTRAVENOUS
Status: DISCONTINUED | OUTPATIENT
Start: 2025-08-21 | End: 2025-08-22

## 2025-08-21 RX ORDER — MIDAZOLAM HYDROCHLORIDE 1 MG/ML
INJECTION INTRAMUSCULAR; INTRAVENOUS
Status: DISCONTINUED | OUTPATIENT
Start: 2025-08-21 | End: 2025-08-21

## 2025-08-21 RX ORDER — SODIUM CHLORIDE, SODIUM LACTATE, POTASSIUM CHLORIDE, CALCIUM CHLORIDE 600; 310; 30; 20 MG/100ML; MG/100ML; MG/100ML; MG/100ML
INJECTION, SOLUTION INTRAVENOUS CONTINUOUS
Status: DISCONTINUED | OUTPATIENT
Start: 2025-08-21 | End: 2025-08-22

## 2025-08-21 RX ORDER — LIDOCAINE HYDROCHLORIDE 20 MG/ML
INJECTION INTRAVENOUS
Status: DISCONTINUED | OUTPATIENT
Start: 2025-08-21 | End: 2025-08-21

## 2025-08-21 RX ADMIN — PROPOFOL 150 MG: 10 INJECTION, EMULSION INTRAVENOUS at 01:08

## 2025-08-21 RX ADMIN — GABAPENTIN 600 MG: 300 CAPSULE ORAL at 10:08

## 2025-08-21 RX ADMIN — EPHEDRINE SULFATE 25 MG: 50 INJECTION INTRAVENOUS at 02:08

## 2025-08-21 RX ADMIN — ENOXAPARIN SODIUM 40 MG: 40 INJECTION SUBCUTANEOUS at 11:08

## 2025-08-21 RX ADMIN — FENTANYL CITRATE 50 MCG: 50 INJECTION, SOLUTION INTRAMUSCULAR; INTRAVENOUS at 01:08

## 2025-08-21 RX ADMIN — MUPIROCIN OINTMENT 1 G: 20 OINTMENT TOPICAL at 08:08

## 2025-08-21 RX ADMIN — ROCURONIUM BROMIDE 50 MG: 10 INJECTION, SOLUTION INTRAVENOUS at 01:08

## 2025-08-21 RX ADMIN — DEXAMETHASONE SODIUM PHOSPHATE 4 MG: 4 INJECTION, SOLUTION INTRA-ARTICULAR; INTRALESIONAL; INTRAMUSCULAR; INTRAVENOUS; SOFT TISSUE at 01:08

## 2025-08-21 RX ADMIN — ACETAMINOPHEN 1000 MG: 500 TABLET, FILM COATED ORAL at 10:08

## 2025-08-21 RX ADMIN — HYDROMORPHONE HYDROCHLORIDE 0.2 MG: 2 INJECTION INTRAMUSCULAR; INTRAVENOUS; SUBCUTANEOUS at 05:08

## 2025-08-21 RX ADMIN — CLINDAMYCIN PHOSPHATE 900 MG: 900 INJECTION, SOLUTION INTRAVENOUS at 08:08

## 2025-08-21 RX ADMIN — ONDANSETRON 4 MG: 2 INJECTION INTRAMUSCULAR; INTRAVENOUS at 01:08

## 2025-08-21 RX ADMIN — MIDAZOLAM 2 MG: 1 INJECTION INTRAMUSCULAR; INTRAVENOUS at 01:08

## 2025-08-21 RX ADMIN — SUGAMMADEX 100 MG: 100 INJECTION, SOLUTION INTRAVENOUS at 04:08

## 2025-08-21 RX ADMIN — SODIUM CHLORIDE: 9 INJECTION, SOLUTION INTRAVENOUS at 05:08

## 2025-08-21 RX ADMIN — CEFTRIAXONE SODIUM 1 G: 1 INJECTION, POWDER, FOR SOLUTION INTRAMUSCULAR; INTRAVENOUS at 08:08

## 2025-08-21 RX ADMIN — SODIUM CHLORIDE, POTASSIUM CHLORIDE, SODIUM LACTATE AND CALCIUM CHLORIDE: 600; 310; 30; 20 INJECTION, SOLUTION INTRAVENOUS at 04:08

## 2025-08-21 RX ADMIN — ROCURONIUM BROMIDE 20 MG: 10 INJECTION, SOLUTION INTRAVENOUS at 02:08

## 2025-08-21 RX ADMIN — DEXMEDETOMIDINE 10 MCG: 200 INJECTION, SOLUTION INTRAVENOUS at 01:08

## 2025-08-21 RX ADMIN — HYDROMORPHONE HYDROCHLORIDE 0.2 MG: 2 INJECTION INTRAMUSCULAR; INTRAVENOUS; SUBCUTANEOUS at 04:08

## 2025-08-21 RX ADMIN — DEXMEDETOMIDINE 10 MCG: 200 INJECTION, SOLUTION INTRAVENOUS at 02:08

## 2025-08-21 RX ADMIN — LIDOCAINE HYDROCHLORIDE 80 MG: 20 INJECTION, SOLUTION INTRAVENOUS at 01:08

## 2025-08-21 RX ADMIN — HYDROCODONE BITARTRATE AND ACETAMINOPHEN 1 TABLET: 5; 325 TABLET ORAL at 07:08

## 2025-08-21 RX ADMIN — SUGAMMADEX 200 MG: 100 INJECTION, SOLUTION INTRAVENOUS at 03:08

## 2025-08-21 RX ADMIN — CEFTRIAXONE SODIUM 1 G: 1 INJECTION, POWDER, FOR SOLUTION INTRAMUSCULAR; INTRAVENOUS at 03:08

## 2025-08-21 RX ADMIN — SODIUM CHLORIDE, POTASSIUM CHLORIDE, SODIUM LACTATE AND CALCIUM CHLORIDE: 600; 310; 30; 20 INJECTION, SOLUTION INTRAVENOUS at 10:08

## 2025-08-21 RX ADMIN — SODIUM CHLORIDE, POTASSIUM CHLORIDE, SODIUM LACTATE AND CALCIUM CHLORIDE: 600; 310; 30; 20 INJECTION, SOLUTION INTRAVENOUS at 01:08

## 2025-08-21 RX ADMIN — ROCURONIUM BROMIDE 20 MG: 10 INJECTION, SOLUTION INTRAVENOUS at 01:08

## 2025-08-21 RX ADMIN — ACETAMINOPHEN 1000 MG: 10 INJECTION, SOLUTION INTRAVENOUS at 05:08

## 2025-08-21 RX ADMIN — CEFAZOLIN SODIUM 2 G: 2 SOLUTION INTRAVENOUS at 01:08

## 2025-08-21 RX ADMIN — FENTANYL CITRATE 100 MCG: 50 INJECTION, SOLUTION INTRAMUSCULAR; INTRAVENOUS at 04:08

## 2025-08-21 RX ADMIN — CLINDAMYCIN PHOSPHATE 900 MG: 900 INJECTION, SOLUTION INTRAVENOUS at 03:08

## 2025-08-22 ENCOUNTER — TELEPHONE (OUTPATIENT)
Dept: FAMILY MEDICINE | Facility: CLINIC | Age: 60
End: 2025-08-22
Payer: MEDICARE

## 2025-08-22 LAB
ALBUMIN SERPL-MCNC: 3 G/DL (ref 3.5–5)
ALBUMIN/GLOB SERPL: 1 RATIO (ref 1.1–2)
ALP SERPL-CCNC: 64 UNIT/L (ref 40–150)
ALT SERPL-CCNC: 28 UNIT/L (ref 0–55)
ANION GAP SERPL CALC-SCNC: 8 MEQ/L
AST SERPL-CCNC: 33 UNIT/L (ref 11–45)
BILIRUB SERPL-MCNC: 0.3 MG/DL
BUN SERPL-MCNC: 13 MG/DL (ref 9.8–20.1)
CALCIUM SERPL-MCNC: 8.4 MG/DL (ref 8.4–10.2)
CHLORIDE SERPL-SCNC: 104 MMOL/L (ref 98–107)
CO2 SERPL-SCNC: 25 MMOL/L (ref 22–29)
CREAT SERPL-MCNC: 0.69 MG/DL (ref 0.55–1.02)
CREAT/UREA NIT SERPL: 19
ERYTHROCYTE [DISTWIDTH] IN BLOOD BY AUTOMATED COUNT: 12.3 % (ref 11.5–17)
ERYTHROCYTE [DISTWIDTH] IN BLOOD BY AUTOMATED COUNT: 12.6 % (ref 11.5–17)
GFR SERPLBLD CREATININE-BSD FMLA CKD-EPI: >60 ML/MIN/1.73/M2
GLOBULIN SER-MCNC: 3.1 GM/DL (ref 2.4–3.5)
GLUCOSE SERPL-MCNC: 271 MG/DL (ref 74–100)
HCT VFR BLD AUTO: 29.7 % (ref 37–47)
HCT VFR BLD AUTO: 32 % (ref 37–47)
HGB BLD-MCNC: 10.4 G/DL (ref 12–16)
HGB BLD-MCNC: 9.7 G/DL (ref 12–16)
MAGNESIUM SERPL-MCNC: 1.5 MG/DL (ref 1.6–2.6)
MCH RBC QN AUTO: 28.3 PG (ref 27–31)
MCH RBC QN AUTO: 28.6 PG (ref 27–31)
MCHC RBC AUTO-ENTMCNC: 32.5 G/DL (ref 33–36)
MCHC RBC AUTO-ENTMCNC: 32.7 G/DL (ref 33–36)
MCV RBC AUTO: 87.2 FL (ref 80–94)
MCV RBC AUTO: 87.6 FL (ref 80–94)
NRBC BLD AUTO-RTO: 0 %
NRBC BLD AUTO-RTO: 0 %
PLATELET # BLD AUTO: 198 X10(3)/MCL (ref 130–400)
PLATELET # BLD AUTO: 202 X10(3)/MCL (ref 130–400)
PMV BLD AUTO: 9.8 FL (ref 7.4–10.4)
PMV BLD AUTO: 9.8 FL (ref 7.4–10.4)
POCT GLUCOSE: 223 MG/DL (ref 70–110)
POTASSIUM SERPL-SCNC: 3.7 MMOL/L (ref 3.5–5.1)
PROT SERPL-MCNC: 6.1 GM/DL (ref 6.4–8.3)
RBC # BLD AUTO: 3.39 X10(6)/MCL (ref 4.2–5.4)
RBC # BLD AUTO: 3.67 X10(6)/MCL (ref 4.2–5.4)
SODIUM SERPL-SCNC: 137 MMOL/L (ref 136–145)
WBC # BLD AUTO: 9.14 X10(3)/MCL (ref 4.5–11.5)
WBC # BLD AUTO: 9.71 X10(3)/MCL (ref 4.5–11.5)

## 2025-08-22 PROCEDURE — 63600175 PHARM REV CODE 636 W HCPCS: Performed by: SURGERY

## 2025-08-22 PROCEDURE — 80053 COMPREHEN METABOLIC PANEL: CPT | Performed by: SURGERY

## 2025-08-22 PROCEDURE — 94761 N-INVAS EAR/PLS OXIMETRY MLT: CPT

## 2025-08-22 PROCEDURE — 96372 THER/PROPH/DIAG INJ SC/IM: CPT | Performed by: SURGERY

## 2025-08-22 PROCEDURE — 85027 COMPLETE CBC AUTOMATED: CPT | Mod: 91 | Performed by: SURGERY

## 2025-08-22 PROCEDURE — 83735 ASSAY OF MAGNESIUM: CPT | Performed by: SURGERY

## 2025-08-22 PROCEDURE — 25000003 PHARM REV CODE 250: Performed by: SURGERY

## 2025-08-22 PROCEDURE — 36415 COLL VENOUS BLD VENIPUNCTURE: CPT | Performed by: SURGERY

## 2025-08-22 PROCEDURE — 99900035 HC TECH TIME PER 15 MIN (STAT)

## 2025-08-22 RX ORDER — GLUCAGON 1 MG
1 KIT INJECTION
Status: DISCONTINUED | OUTPATIENT
Start: 2025-08-22 | End: 2025-08-23 | Stop reason: HOSPADM

## 2025-08-22 RX ORDER — CLINDAMYCIN HYDROCHLORIDE 150 MG/1
300 CAPSULE ORAL EVERY 6 HOURS
Status: DISCONTINUED | OUTPATIENT
Start: 2025-08-22 | End: 2025-08-23 | Stop reason: HOSPADM

## 2025-08-22 RX ORDER — INSULIN ASPART 100 [IU]/ML
0-5 INJECTION, SOLUTION INTRAVENOUS; SUBCUTANEOUS
Status: DISCONTINUED | OUTPATIENT
Start: 2025-08-22 | End: 2025-08-23 | Stop reason: HOSPADM

## 2025-08-22 RX ORDER — IBUPROFEN 200 MG
16 TABLET ORAL
Status: DISCONTINUED | OUTPATIENT
Start: 2025-08-22 | End: 2025-08-23 | Stop reason: HOSPADM

## 2025-08-22 RX ORDER — IBUPROFEN 200 MG
24 TABLET ORAL
Status: DISCONTINUED | OUTPATIENT
Start: 2025-08-22 | End: 2025-08-23 | Stop reason: HOSPADM

## 2025-08-22 RX ORDER — DOXYCYCLINE HYCLATE 100 MG
100 TABLET ORAL EVERY 12 HOURS
Status: DISCONTINUED | OUTPATIENT
Start: 2025-08-22 | End: 2025-08-23 | Stop reason: HOSPADM

## 2025-08-22 RX ADMIN — HYDROCODONE BITARTRATE AND ACETAMINOPHEN 1 TABLET: 5; 325 TABLET ORAL at 04:08

## 2025-08-22 RX ADMIN — INSULIN ASPART 2 UNITS: 100 INJECTION, SOLUTION INTRAVENOUS; SUBCUTANEOUS at 06:08

## 2025-08-22 RX ADMIN — HYDROCODONE BITARTRATE AND ACETAMINOPHEN 1 TABLET: 5; 325 TABLET ORAL at 12:08

## 2025-08-22 RX ADMIN — LEVOTHYROXINE SODIUM 50 MCG: 0.05 TABLET ORAL at 06:08

## 2025-08-22 RX ADMIN — HYDROCODONE BITARTRATE AND ACETAMINOPHEN 1 TABLET: 5; 325 TABLET ORAL at 08:08

## 2025-08-22 RX ADMIN — CEFTRIAXONE SODIUM 1 G: 1 INJECTION, POWDER, FOR SOLUTION INTRAMUSCULAR; INTRAVENOUS at 08:08

## 2025-08-22 RX ADMIN — CLINDAMYCIN PHOSPHATE 900 MG: 900 INJECTION, SOLUTION INTRAVENOUS at 08:08

## 2025-08-22 RX ADMIN — CLINDAMYCIN PHOSPHATE 900 MG: 900 INJECTION, SOLUTION INTRAVENOUS at 03:08

## 2025-08-22 RX ADMIN — CLINDAMYCIN HYDROCHLORIDE 300 MG: 150 CAPSULE ORAL at 10:08

## 2025-08-22 RX ADMIN — DOXYCYCLINE HYCLATE 100 MG: 100 TABLET ORAL at 10:08

## 2025-08-22 RX ADMIN — BUSPIRONE HYDROCHLORIDE 10 MG: 5 TABLET ORAL at 08:08

## 2025-08-23 VITALS
OXYGEN SATURATION: 97 % | SYSTOLIC BLOOD PRESSURE: 116 MMHG | WEIGHT: 220 LBS | BODY MASS INDEX: 36.65 KG/M2 | DIASTOLIC BLOOD PRESSURE: 54 MMHG | TEMPERATURE: 99 F | RESPIRATION RATE: 18 BRPM | HEART RATE: 71 BPM | HEIGHT: 65 IN

## 2025-08-23 LAB
ALBUMIN SERPL-MCNC: 2.8 G/DL (ref 3.5–5)
ALBUMIN/GLOB SERPL: 0.9 RATIO (ref 1.1–2)
ALP SERPL-CCNC: 62 UNIT/L (ref 40–150)
ALT SERPL-CCNC: 16 UNIT/L (ref 0–55)
ANION GAP SERPL CALC-SCNC: 8 MEQ/L
AST SERPL-CCNC: 16 UNIT/L (ref 11–45)
BILIRUB SERPL-MCNC: 0.2 MG/DL
BUN SERPL-MCNC: 13 MG/DL (ref 9.8–20.1)
CALCIUM SERPL-MCNC: 8.3 MG/DL (ref 8.4–10.2)
CHLORIDE SERPL-SCNC: 104 MMOL/L (ref 98–107)
CO2 SERPL-SCNC: 28 MMOL/L (ref 22–29)
CREAT SERPL-MCNC: 0.63 MG/DL (ref 0.55–1.02)
CREAT/UREA NIT SERPL: 21
ERYTHROCYTE [DISTWIDTH] IN BLOOD BY AUTOMATED COUNT: 12.6 % (ref 11.5–17)
GFR SERPLBLD CREATININE-BSD FMLA CKD-EPI: >60 ML/MIN/1.73/M2
GLOBULIN SER-MCNC: 3 GM/DL (ref 2.4–3.5)
GLUCOSE SERPL-MCNC: 202 MG/DL (ref 74–100)
HCT VFR BLD AUTO: 27.5 % (ref 37–47)
HGB BLD-MCNC: 8.9 G/DL (ref 12–16)
MAGNESIUM SERPL-MCNC: 1.5 MG/DL (ref 1.6–2.6)
MCH RBC QN AUTO: 28.3 PG (ref 27–31)
MCHC RBC AUTO-ENTMCNC: 32.4 G/DL (ref 33–36)
MCV RBC AUTO: 87.3 FL (ref 80–94)
NRBC BLD AUTO-RTO: 0 %
PLATELET # BLD AUTO: 187 X10(3)/MCL (ref 130–400)
PMV BLD AUTO: 10.5 FL (ref 7.4–10.4)
POTASSIUM SERPL-SCNC: 3 MMOL/L (ref 3.5–5.1)
PROT SERPL-MCNC: 5.8 GM/DL (ref 6.4–8.3)
RBC # BLD AUTO: 3.15 X10(6)/MCL (ref 4.2–5.4)
SODIUM SERPL-SCNC: 140 MMOL/L (ref 136–145)
WBC # BLD AUTO: 8.43 X10(3)/MCL (ref 4.5–11.5)

## 2025-08-23 PROCEDURE — 25000003 PHARM REV CODE 250: Performed by: SURGERY

## 2025-08-23 PROCEDURE — 36415 COLL VENOUS BLD VENIPUNCTURE: CPT | Performed by: SURGERY

## 2025-08-23 PROCEDURE — 94761 N-INVAS EAR/PLS OXIMETRY MLT: CPT

## 2025-08-23 PROCEDURE — 94799 UNLISTED PULMONARY SVC/PX: CPT

## 2025-08-23 PROCEDURE — 85027 COMPLETE CBC AUTOMATED: CPT | Performed by: SURGERY

## 2025-08-23 PROCEDURE — 99900035 HC TECH TIME PER 15 MIN (STAT)

## 2025-08-23 PROCEDURE — 83735 ASSAY OF MAGNESIUM: CPT | Performed by: SURGERY

## 2025-08-23 PROCEDURE — 80053 COMPREHEN METABOLIC PANEL: CPT | Performed by: SURGERY

## 2025-08-23 RX ORDER — ONDANSETRON 4 MG/1
4 TABLET, FILM COATED ORAL ONCE
Status: COMPLETED | OUTPATIENT
Start: 2025-08-23 | End: 2025-08-23

## 2025-08-23 RX ADMIN — HYDROCODONE BITARTRATE AND ACETAMINOPHEN 1 TABLET: 5; 325 TABLET ORAL at 09:08

## 2025-08-23 RX ADMIN — BUSPIRONE HYDROCHLORIDE 10 MG: 5 TABLET ORAL at 08:08

## 2025-08-23 RX ADMIN — DOXYCYCLINE HYCLATE 100 MG: 100 TABLET ORAL at 08:08

## 2025-08-23 RX ADMIN — ONDANSETRON HYDROCHLORIDE 4 MG: 4 TABLET, FILM COATED ORAL at 08:08

## 2025-08-23 RX ADMIN — MUPIROCIN OINTMENT 1 G: 20 OINTMENT TOPICAL at 08:08

## 2025-08-23 RX ADMIN — BACITRACIN ZINC, NEOMYCIN SULFATE, AND POLYMYXIN B SULFATE: 400; 3.5; 5 OINTMENT TOPICAL at 09:08

## 2025-08-23 RX ADMIN — CLINDAMYCIN HYDROCHLORIDE 300 MG: 150 CAPSULE ORAL at 05:08

## 2025-08-23 RX ADMIN — HYDROCODONE BITARTRATE AND ACETAMINOPHEN 1 TABLET: 5; 325 TABLET ORAL at 12:08

## 2025-08-23 RX ADMIN — LEVOTHYROXINE SODIUM 50 MCG: 0.05 TABLET ORAL at 05:08

## 2025-08-23 RX ADMIN — HYDROCODONE BITARTRATE AND ACETAMINOPHEN 1 TABLET: 5; 325 TABLET ORAL at 05:08

## 2025-08-27 LAB — PSYCHE PATHOLOGY RESULT: NORMAL

## (undated) DEVICE — CUFF ATS 2 PORT SNGL BLDR 18IN

## (undated) DEVICE — GLOVE SENSICARE PI GRN 6.5

## (undated) DEVICE — ELECTRODE BLADE INSULATED 1 IN

## (undated) DEVICE — SPONGE COTTON TRAY 4X4IN

## (undated) DEVICE — Device

## (undated) DEVICE — DRAPE HAND STERILE

## (undated) DEVICE — GLOVE PROTEXIS HYDROGEL SZ8.5

## (undated) DEVICE — BLANKET WARMING UPPER BODY

## (undated) DEVICE — GOWN POLY REINF BRTH SLV XL

## (undated) DEVICE — GLOVE SENSICARE NEOPRENE 6.5

## (undated) DEVICE — DRAPE THREE-QUARTER 53X77IN

## (undated) DEVICE — SOL NACL IRR 1000ML BTL

## (undated) DEVICE — SUT PLN GUT 2-0 CT-3 1X27

## (undated) DEVICE — GOWN ECLIPSE REINF LVL4 TWL XL

## (undated) DEVICE — GLOVE PROTEXIS HYDROGEL SZ6.5

## (undated) DEVICE — DRAIN PERFORATED FLAT 3/4 10MM

## (undated) DEVICE — PAD ELECTROSURGICAL SPL W/CORD

## (undated) DEVICE — ELECTRODE PATIENT RETURN DISP

## (undated) DEVICE — POSITIONER HEEL FOAM CONVOLTD

## (undated) DEVICE — SUT VICRYL COAT ANTI 0 27IN

## (undated) DEVICE — BINDER ABD 12IN LG 63-74IN

## (undated) DEVICE — CLIP LIGATION MEDIUM CLIPS 1

## (undated) DEVICE — DRESSING XEROFORM NONADH 1X8IN

## (undated) DEVICE — NDL SAFETY 21G X 1 IN ECLIPSE

## (undated) DEVICE — CHLORAPREP W TINT 26ML APPL

## (undated) DEVICE — KIT PREVENA INCISION MGMT 13CM

## (undated) DEVICE — GLOVE SENSICARE PI GRN 7

## (undated) DEVICE — EVACUATOR SURG SUC BLBLF 100ML

## (undated) DEVICE — GLOVE SENSICARE PI SURG 6.5

## (undated) DEVICE — APPLICATOR CHLORAPREP ORN 26ML

## (undated) DEVICE — SLING ARM LARGE

## (undated) DEVICE — BELLOW CANN HEMOBLAST 1.65GR

## (undated) DEVICE — PENCIL ZIP PEN EVAC 22MM 10FT

## (undated) DEVICE — MARKER SKIN RULER AND LABEL

## (undated) DEVICE — GLOVE PROTEXIS BLUE LATEX 8.5

## (undated) DEVICE — DRAPE STERI U-SHAPED 47X51IN

## (undated) DEVICE — DRAIN PENRS SIL STRL .25X18IN

## (undated) DEVICE — SEALER LIGASURE IMPACT 18CM

## (undated) DEVICE — SUT BLK MONO 3-0 CUT 18IN F

## (undated) DEVICE — GOWN POLY REINF X-LONG 2XL

## (undated) DEVICE — VAC WOUND DISPOSABLE PREVENA

## (undated) DEVICE — GLOVE SIGNATURE ESSNTL LTX 6.5

## (undated) DEVICE — SUPPORT ULNA NERVE PROTECTOR

## (undated) DEVICE — KIT SURGICAL TURNOVER

## (undated) DEVICE — SPONGE LAP 18X18 PREWASHED

## (undated) DEVICE — GLOVE SENSICARE PI SLT 7.5

## (undated) DEVICE — BANDAGE VELCLOSE ELAS 3INX5YD